# Patient Record
Sex: FEMALE | Race: WHITE | NOT HISPANIC OR LATINO | Employment: OTHER | URBAN - NONMETROPOLITAN AREA
[De-identification: names, ages, dates, MRNs, and addresses within clinical notes are randomized per-mention and may not be internally consistent; named-entity substitution may affect disease eponyms.]

---

## 2017-01-23 DIAGNOSIS — N18.9 CHRONIC KIDNEY DISEASE, UNSPECIFIED: Primary | ICD-10-CM

## 2017-01-24 ENCOUNTER — LAB (OUTPATIENT)
Dept: ONCOLOGY | Facility: CLINIC | Age: 77
End: 2017-01-24

## 2017-01-24 ENCOUNTER — OFFICE VISIT (OUTPATIENT)
Dept: ONCOLOGY | Facility: CLINIC | Age: 77
End: 2017-01-24

## 2017-01-24 ENCOUNTER — INFUSION (OUTPATIENT)
Dept: ONCOLOGY | Facility: HOSPITAL | Age: 77
End: 2017-01-24

## 2017-01-24 VITALS
SYSTOLIC BLOOD PRESSURE: 129 MMHG | HEART RATE: 78 BPM | RESPIRATION RATE: 20 BRPM | DIASTOLIC BLOOD PRESSURE: 70 MMHG | HEIGHT: 62 IN | TEMPERATURE: 97.4 F | OXYGEN SATURATION: 99 % | BODY MASS INDEX: 23.55 KG/M2 | WEIGHT: 128 LBS

## 2017-01-24 VITALS
TEMPERATURE: 98 F | BODY MASS INDEX: 23.55 KG/M2 | RESPIRATION RATE: 16 BRPM | WEIGHT: 128 LBS | HEIGHT: 62 IN | SYSTOLIC BLOOD PRESSURE: 136 MMHG | DIASTOLIC BLOOD PRESSURE: 82 MMHG | HEART RATE: 80 BPM | OXYGEN SATURATION: 98 %

## 2017-01-24 DIAGNOSIS — N18.30 ANEMIA OF CHRONIC RENAL FAILURE, STAGE 3 (MODERATE) (HCC): Primary | ICD-10-CM

## 2017-01-24 DIAGNOSIS — D63.1 ANEMIA OF CHRONIC RENAL FAILURE, STAGE 3 (MODERATE) (HCC): Primary | ICD-10-CM

## 2017-01-24 DIAGNOSIS — N18.30 ANEMIA OF CHRONIC KIDNEY FAILURE, STAGE 3 (MODERATE) (HCC): ICD-10-CM

## 2017-01-24 DIAGNOSIS — N18.30 ANEMIA OF CHRONIC KIDNEY FAILURE, STAGE 3 (MODERATE) (HCC): Primary | ICD-10-CM

## 2017-01-24 DIAGNOSIS — N18.9 CHRONIC KIDNEY DISEASE, UNSPECIFIED: ICD-10-CM

## 2017-01-24 DIAGNOSIS — D63.1 ANEMIA OF CHRONIC KIDNEY FAILURE, STAGE 3 (MODERATE) (HCC): ICD-10-CM

## 2017-01-24 DIAGNOSIS — D63.1 ANEMIA OF CHRONIC KIDNEY FAILURE, STAGE 3 (MODERATE) (HCC): Primary | ICD-10-CM

## 2017-01-24 PROBLEM — E53.8 B12 DEFICIENCY: Status: ACTIVE | Noted: 2017-01-24

## 2017-01-24 LAB
ALBUMIN SERPL-MCNC: 3.2 G/DL (ref 3.5–5)
ALBUMIN/GLOB SERPL: 1.1 G/DL
ALP SERPL-CCNC: 71 U/L (ref 38–126)
ALT SERPL W P-5'-P-CCNC: 21 U/L (ref 9–52)
ANION GAP SERPL CALCULATED.3IONS-SCNC: 11 MMOL/L
AST SERPL-CCNC: 30 U/L (ref 5–40)
AUTO MIXED CELLS #: 0.3 10*3/UL (ref 0.1–1.5)
AUTO MIXED CELLS %: 4.8 % (ref 0.2–15.1)
BILIRUB SERPL-MCNC: 0.3 MG/DL (ref 0.2–1.3)
BUN BLD-MCNC: 33 MG/DL (ref 7–26)
BUN/CREAT SERPL: 14.3 (ref 7–25)
CALCIUM SPEC-SCNC: 8.6 MG/DL (ref 8.4–10.2)
CHLORIDE SERPL-SCNC: 109 MMOL/L (ref 98–107)
CO2 SERPL-SCNC: 22 MMOL/L (ref 22–30)
CREAT BLD-MCNC: 2.3 MG/DL (ref 0.7–1.4)
ERYTHROCYTE [DISTWIDTH] IN BLOOD BY AUTOMATED COUNT: 15.2 % (ref 11.5–14.5)
GFR SERPL CREATININE-BSD FRML MDRD: 21 ML/MIN/1.73
GLOBULIN UR ELPH-MCNC: 3 GM/DL
GLUCOSE BLD-MCNC: 88 MG/DL (ref 75–110)
HCT VFR BLD AUTO: 31.9 % (ref 37–47)
HGB BLD-MCNC: 9.2 G/DL (ref 12–16)
LYMPHOCYTES # BLD AUTO: 2.5 10*3/MM3 (ref 0.8–7)
LYMPHOCYTES NFR BLD AUTO: 35.6 % (ref 10–58.5)
MCH RBC QN AUTO: 29.8 PG (ref 27–31)
MCHC RBC AUTO-ENTMCNC: 28.8 G/DL (ref 33–37)
MCV RBC AUTO: 103.2 FL (ref 81–99)
NEUTROPHILS # BLD AUTO: 4.2 10*3/MM3 (ref 2–7.8)
NEUTROPHILS NFR BLD AUTO: 59.6 % (ref 37–92)
PLATELET # BLD AUTO: 227 10*3/MM3 (ref 130–400)
PMV BLD AUTO: 8.9 FL (ref 6–12)
POTASSIUM BLD-SCNC: 3.8 MMOL/L (ref 3.6–5)
PROT SERPL-MCNC: 6.2 G/DL (ref 6.3–8.2)
RBC # BLD AUTO: 3.09 10*6/MM3 (ref 4.2–5.4)
SODIUM BLD-SCNC: 142 MMOL/L (ref 137–145)
WBC NRBC COR # BLD: 7 10*3/MM3 (ref 4.8–10.8)

## 2017-01-24 PROCEDURE — 25010000002 EPOETIN ALFA PER 1000 UNITS: Performed by: INTERNAL MEDICINE

## 2017-01-24 PROCEDURE — 85025 COMPLETE CBC W/AUTO DIFF WBC: CPT | Performed by: INTERNAL MEDICINE

## 2017-01-24 PROCEDURE — 80053 COMPREHEN METABOLIC PANEL: CPT | Performed by: INTERNAL MEDICINE

## 2017-01-24 PROCEDURE — 25010000002 CYANOCOBALAMIN PER 1000 MCG: Performed by: INTERNAL MEDICINE

## 2017-01-24 PROCEDURE — 99214 OFFICE O/P EST MOD 30 MIN: CPT | Performed by: INTERNAL MEDICINE

## 2017-01-24 PROCEDURE — 96372 THER/PROPH/DIAG INJ SC/IM: CPT

## 2017-01-24 PROCEDURE — 36415 COLL VENOUS BLD VENIPUNCTURE: CPT | Performed by: INTERNAL MEDICINE

## 2017-01-24 RX ORDER — CYANOCOBALAMIN 1000 UG/ML
1000 INJECTION, SOLUTION INTRAMUSCULAR; SUBCUTANEOUS ONCE
Status: CANCELLED | OUTPATIENT
Start: 2017-01-24

## 2017-01-24 RX ORDER — CYANOCOBALAMIN 1000 UG/ML
1000 INJECTION, SOLUTION INTRAMUSCULAR; SUBCUTANEOUS ONCE
Status: CANCELLED | OUTPATIENT
Start: 2017-02-21

## 2017-01-24 RX ORDER — CYANOCOBALAMIN 1000 UG/ML
1000 INJECTION, SOLUTION INTRAMUSCULAR; SUBCUTANEOUS ONCE
Status: COMPLETED | OUTPATIENT
Start: 2017-01-24 | End: 2017-01-24

## 2017-01-24 RX ADMIN — CYANOCOBALAMIN 1000 MCG: 1000 INJECTION, SOLUTION INTRAMUSCULAR at 14:55

## 2017-01-24 RX ADMIN — ERYTHROPOIETIN 40000 UNITS: 40000 INJECTION, SOLUTION INTRAVENOUS; SUBCUTANEOUS at 14:46

## 2017-01-24 NOTE — PROGRESS NOTES
Patient ID:   Susanne Valentine is a 76 y.o. female.  Referring Physician:   No referring provider defined for this encounter.  Primary Care Provider:  KOURTNEY Celeste    Assessment/Plan:  Assessment   Patient Active Problem List   Diagnosis   • Anemia of chronic kidney failure   • Anemia of chronic renal failure, stage 3 (moderate)     Cancer Staging Information:  No matching staging information was found for the patient.    There are no diagnoses linked to this encounter.  The patient has a stable anemia from her chronic kidney disease, with the use of Procrit once a month as necessary.  We will hold the treatment today, and have her come back in 1 month with a repeat CBC, we will proceed with Procrit at 40,000 units subcutaneously if her hemoglobin is less than 10 g/DL.  Patient ID:   Susanne Valentine is a 76 y.o. female.  Referring Physician:   No referring provider defined for this encounter.  Primary Care Provider:  KOURTNEY Celeste    Assessment/Plan:  Assessment   Patient Active Problem List   Diagnosis   • Anemia of chronic kidney failure   • Anemia of chronic renal failure, stage 3 (moderate)     Cancer Staging Information:  No matching staging information was found for the patient.    There are no diagnoses linked to this encounter.  The patient has a stable anemia from her chronic kidney disease, with the use of Procrit once a month as necessary.  We will hold the treatment today, and have her come back in 1 month with a repeat CBC, we will proceed with Procrit at 40,000 units subcutaneously if her hemoglobin is less than 10 g/DL.     Interval History:  The patient is here for follow-up and for possible consideration for Procrit.  She has no new complaints at this time and has been doing well.  Apparently her   5 years ago, and she reminded me that I am seeing her in the past when I was still here.  She did not get any Procrit on her last visit    Interval Notes:  The following  "portions of the patient's history were reviewed and updated as appropriate: allergies, current medications, past family history, past medical history, past social history, past surgical history and problem list.     History of Present Illness   76-year-old white lady with chronic kidney disease has an anemia and has been on ESAs since 2008. She has been tolerating it well and her anemia has been stable with a supplement.    Review of Systems   Constitutional: Negative.    HENT: Negative.    Eyes: Negative.    Respiratory: Negative.    Cardiovascular: Negative.    Gastrointestinal: Negative.    Endocrine: Negative.    Genitourinary: Negative.    Musculoskeletal: Negative.    Allergic/Immunologic: Negative.    Neurological: Negative.    Hematological: Negative.    Psychiatric/Behavioral: Negative.         Physical Exam:  Vital Signs for this encounter:  BSA: 1.58 meters squared  Visit Vitals   • /82   • Pulse 80   • Temp 98 °F (36.7 °C) (Tympanic)   • Resp 16   • Ht 62\" (157.5 cm)   • Wt 128 lb (58.1 kg)   • LMP  (LMP Unknown)   • SpO2 98%   • BMI 23.41 kg/m2       Physical Exam   Constitutional: She is oriented to person, place, and time. She appears well-developed and well-nourished. No distress.   HENT:   Head: Normocephalic and atraumatic.   Nose: Nose normal.   Mouth/Throat: Oropharynx is clear and moist. No oropharyngeal exudate.   Eyes: Conjunctivae and EOM are normal. Pupils are equal, round, and reactive to light. No scleral icterus.   Neck: Normal range of motion. Neck supple. No JVD present. No thyromegaly present.   Cardiovascular: Normal rate, regular rhythm and normal heart sounds.  Exam reveals no gallop and no friction rub.    Pulmonary/Chest: Effort normal and breath sounds normal. No respiratory distress. She has no wheezes. She has no rales. She exhibits no tenderness.   Abdominal: Soft. Bowel sounds are normal. She exhibits no distension. There is no tenderness. There is no guarding. "   Musculoskeletal: Normal range of motion. She exhibits no edema or tenderness.   Lymphadenopathy:     She has no cervical adenopathy.   Neurological: She is alert and oriented to person, place, and time. No cranial nerve deficit.   Skin: Skin is warm and dry.   Few purpuric spots, forearms   Psychiatric: She has a normal mood and affect. Her behavior is normal. Judgment and thought content normal.       Performance Status:  Asymptomatic    Results:  WBC   Date Value Ref Range Status   2017 7.00 4.80 - 10.80 10*3/mm3 Final   2014 6.73 4.80 - 10.80 K/mcL Final     HEMOGLOBIN   Date Value Ref Range Status   2017 9.2 (L) 12.0 - 16.0 g/dL Final   2014 10.5 (L) 12.0 - 16.0 g/dL Final     HEMATOCRIT   Date Value Ref Range Status   2017 31.9 (L) 37.0 - 47.0 % Final   2014 31.9 (L) 37.0 - 47.0 % Final     PLATELETS   Date Value Ref Range Status   2017 227 130 - 400 10*3/mm3 Final   2014 232 130 - 400 K/mcL Final     CREATININE   Date Value Ref Range Status   2016 2.10 (H) 0.70 - 1.40 mg/dL Final   2016 1.69 (H) 0.5 - 1.4 mg/dL Final     AST (SGOT)   Date Value Ref Range Status   2016 29 5 - 40 U/L Final   2016 56 (H) 7 - 45 Units/L Final        Interval History:  The patient is here for follow-up and for possible consideration for Procrit.  She has no new complaints at this time and has been doing well.  Apparently her   5 years ago, and she reminded me that I am seeing her in the past when I was still here.  She did not get any Procrit on her last visit    Interval Notes:  The following portions of the patient's history were reviewed and updated as appropriate: allergies, current medications, past family history, past medical history, past social history, past surgical history and problem list.     History of Present Illness   76-year-old white lady with chronic kidney disease has an anemia and has been on ESAs since . She has been  "tolerating it well and her anemia has been stable with a supplement.    Review of Systems   Constitutional: Negative.    HENT: Negative.    Eyes: Negative.    Respiratory: Negative.    Cardiovascular: Negative.    Gastrointestinal: Negative.    Endocrine: Negative.    Genitourinary: Negative.    Musculoskeletal: Negative.    Allergic/Immunologic: Negative.    Neurological: Negative.    Hematological: Negative.    Psychiatric/Behavioral: Negative.         Physical Exam:  Vital Signs for this encounter:  BSA: 1.58 meters squared  Visit Vitals   • /82   • Pulse 80   • Temp 98 °F (36.7 °C) (Tympanic)   • Resp 16   • Ht 62\" (157.5 cm)   • Wt 128 lb (58.1 kg)   • LMP  (LMP Unknown)   • SpO2 98%   • BMI 23.41 kg/m2       Physical Exam   Constitutional: She is oriented to person, place, and time. She appears well-developed and well-nourished. No distress.   HENT:   Head: Normocephalic and atraumatic.   Nose: Nose normal.   Mouth/Throat: Oropharynx is clear and moist. No oropharyngeal exudate.   Eyes: Conjunctivae and EOM are normal. Pupils are equal, round, and reactive to light. No scleral icterus.   Neck: Normal range of motion. Neck supple. No JVD present. No thyromegaly present.   Cardiovascular: Normal rate, regular rhythm and normal heart sounds.  Exam reveals no gallop and no friction rub.    Pulmonary/Chest: Effort normal and breath sounds normal. No respiratory distress. She has no wheezes. She has no rales. She exhibits no tenderness.   Abdominal: Soft. Bowel sounds are normal. She exhibits no distension. There is no tenderness. There is no guarding.   Musculoskeletal: Normal range of motion. She exhibits no edema or tenderness.   Lymphadenopathy:     She has no cervical adenopathy.   Neurological: She is alert and oriented to person, place, and time. No cranial nerve deficit.   Skin: Skin is warm and dry.   Few purpuric spots, forearms   Psychiatric: She has a normal mood and affect. Her behavior is normal. " Judgment and thought content normal.       Performance Status:  Asymptomatic    Results:  WBC   Date Value Ref Range Status   01/24/2017 7.00 4.80 - 10.80 10*3/mm3 Final   01/09/2014 6.73 4.80 - 10.80 K/mcL Final     HEMOGLOBIN   Date Value Ref Range Status   01/24/2017 9.2 (L) 12.0 - 16.0 g/dL Final   01/09/2014 10.5 (L) 12.0 - 16.0 g/dL Final     HEMATOCRIT   Date Value Ref Range Status   01/24/2017 31.9 (L) 37.0 - 47.0 % Final   01/09/2014 31.9 (L) 37.0 - 47.0 % Final     PLATELETS   Date Value Ref Range Status   01/24/2017 227 130 - 400 10*3/mm3 Final   01/09/2014 232 130 - 400 K/mcL Final     CREATININE   Date Value Ref Range Status   12/23/2016 2.10 (H) 0.70 - 1.40 mg/dL Final   03/22/2016 1.69 (H) 0.5 - 1.4 mg/dL Final     AST (SGOT)   Date Value Ref Range Status   12/23/2016 29 5 - 40 U/L Final   03/22/2016 56 (H) 7 - 45 Units/L Final     Anemia of chronic kidney disease.  Hemoglobin today is 9.2 continue Procrit.  Patient clinically asymptomatic.  Over the last 3 months for MCV of, therefore I am would check vitamin B12 and folic acid on today's draw and I have asked her to start taking oral folic acid 400 µg daily as well as start receiving vitamin B12 1000 µg subcutaneous every 4 weekly.  If the MCV remains high in another 3-4 months I may consider doing a bone marrow biopsy to diagnose myelodysplasia.  The last colonoscopy was done within the last 5 years and a verbal report given to me by the patient it was negative.

## 2017-01-24 NOTE — MR AVS SNAPSHOT
Susanne GRACE Serge   1/24/2017 1:00 PM   Office Visit    Dept Phone:  [phone]   Encounter #:  [id number]    Provider:  García Wood MD   Department:  [facility] HEMATOLOGY & ONCOLOGY                Your Full Care Plan              Your Updated Medication List          This list is accurate as of: 1/24/17  1:44 PM.  Always use your most recent med list.                amLODIPine 5 MG tablet   Commonly known as:  NORVASC       aspirin 81 MG tablet       AVAPRO 300 MG tablet   Generic drug:  irbesartan       calcitriol 0.25 MCG capsule   Commonly known as:  ROCALTROL       diltiaZEM 120 MG tablet   Commonly known as:  CARDIZEM       esomeprazole 40 MG capsule   Commonly known as:  nexIUM       ferrous sulfate 325 (65 FE) MG tablet       fish oil 1000 MG capsule capsule       furosemide 40 MG tablet   Commonly known as:  LASIX       * lisinopril 5 MG tablet   Commonly known as:  PRINIVIL,ZESTRIL       * lisinopril 40 MG tablet   Commonly known as:  PRINIVIL,ZESTRIL       omeprazole 20 MG capsule   Commonly known as:  priLOSEC       PRESERVISION AREDS PO       spironolactone 25 MG tablet   Commonly known as:  ALDACTONE       * SYNTHROID 25 MCG tablet   Generic drug:  levothyroxine       * levothyroxine 50 MCG tablet   Commonly known as:  SYNTHROID, LEVOTHROID       Vitamin D3 1000 UNITS capsule       ZETIA 10 MG tablet   Generic drug:  ezetimibe       * Notice:  This list has 4 medication(s) that are the same as other medications prescribed for you. Read the directions carefully, and ask your doctor or other care provider to review them with you.            Instructions     None    Patient Instructions History      Upcoming Appointments     Visit Type Date Time Department    FOLLOW UP 1 UNIT 1/24/2017  1:00 PM [facility]    LAB 1/24/2017 12:45 PM [facility]    INJECTION - NON CHEMO 1/24/2017  2:30 PM  [facility] OP INFU ONC    FOLLOW UP 1 UNIT 2/27/2017 11:30 AM [facility]  ONC [facility]    LAB 2/27/2017 11:15 AM [facility] [place]    MAMMO PAD DIAG SG BILATERAL 6/30/2017 10:20 AM [facility] MAMMO BIC    US PAD BREAST RIGHT COMPLETE 6/30/2017 11:00 AM [facility] US BIC      MyChart Signup     Our records indicate that your [facility] account has been deactivated. If you would like to reactivate your account, please email [email or web address] or call [phone] to talk to our [employer] staff.             Other Info from Your Visit           Your Appointments     Jan 24, 2017  2:30 PM CST   INJECTION with -3  PAD OP INFUS   [facility] OP INFU ONC– ONCOLOGY ([place])    [address] [place] [id number] [phone]            Feb 27, 2017 11:15 AM CST   LAB with [facility] ONC PAD LAB   [facility] HEMATOLOGY & ONCOLOGY ([place])    [address] [address]   [phone]            Feb 27, 2017 11:30 AM CST   FOLLOW UP with García Wood MD   [facility] HEMATOLOGY & ONCOLOGY ([place])    [address] [address]   [phone]            Jun 30, 2017 10:20 AM CDT   (Arrive by 9:55 AM CST)   Mammo pad  diag bilateral with PAD BIC MAMM 1   [facility] MAMMO BIC ([place]thel)    [address] [place] [id number] [phone]           Arrive 30 minutes prior to exam time. Do not apply deodorant or powders on the day of the exam. Must bring previous Mammography images/reports if not taken at [facility]. Can take all medications as directed.            Jun 30, 2017 11:00 AM CDT   US pad breast right complete with PAD BIC US 1   [facility] US BIC (Ethel)    [address] [place] [id number] [phone]           Please arrive 30 min early to register Bring a list of current medications. Medications are okay to take.              Allergies     Statins  Other (See Comments)    Muscle aches       Vital Signs     Blood Pressure Pulse Temperature Respirations Height  "Weight    136/82 80 98 °F (36.7 °C) (Tympanic) 16 62\" (157.5 cm) 128 lb (58.1 kg)    Last Menstrual Period Oxygen Saturation Body Mass Index Smoking Status          (LMP Unknown) 98% 23.41 kg/m2 Never Smoker          "

## 2017-02-23 DIAGNOSIS — N18.30 ANEMIA OF CHRONIC KIDNEY FAILURE, STAGE 3 (MODERATE) (HCC): ICD-10-CM

## 2017-02-23 DIAGNOSIS — D63.1 ANEMIA OF CHRONIC RENAL FAILURE, STAGE 3 (MODERATE) (HCC): ICD-10-CM

## 2017-02-23 DIAGNOSIS — D63.1 ANEMIA OF CHRONIC KIDNEY FAILURE, STAGE 3 (MODERATE) (HCC): ICD-10-CM

## 2017-02-23 DIAGNOSIS — N18.30 ANEMIA OF CHRONIC RENAL FAILURE, STAGE 3 (MODERATE) (HCC): ICD-10-CM

## 2017-02-24 ENCOUNTER — INFUSION (OUTPATIENT)
Dept: ONCOLOGY | Facility: HOSPITAL | Age: 77
End: 2017-02-24
Attending: INTERNAL MEDICINE

## 2017-02-24 ENCOUNTER — LAB (OUTPATIENT)
Dept: ONCOLOGY | Facility: CLINIC | Age: 77
End: 2017-02-24

## 2017-02-24 ENCOUNTER — OFFICE VISIT (OUTPATIENT)
Dept: ONCOLOGY | Facility: CLINIC | Age: 77
End: 2017-02-24

## 2017-02-24 VITALS
OXYGEN SATURATION: 97 % | BODY MASS INDEX: 24.79 KG/M2 | TEMPERATURE: 98.7 F | HEART RATE: 82 BPM | SYSTOLIC BLOOD PRESSURE: 122 MMHG | DIASTOLIC BLOOD PRESSURE: 70 MMHG | WEIGHT: 134.7 LBS | RESPIRATION RATE: 16 BRPM | HEIGHT: 62 IN

## 2017-02-24 VITALS
TEMPERATURE: 97.3 F | WEIGHT: 135 LBS | DIASTOLIC BLOOD PRESSURE: 69 MMHG | HEART RATE: 72 BPM | BODY MASS INDEX: 25.49 KG/M2 | SYSTOLIC BLOOD PRESSURE: 150 MMHG | HEIGHT: 61 IN | OXYGEN SATURATION: 100 % | RESPIRATION RATE: 20 BRPM

## 2017-02-24 DIAGNOSIS — D63.1 ANEMIA OF CHRONIC RENAL FAILURE, STAGE 3 (MODERATE) (HCC): ICD-10-CM

## 2017-02-24 DIAGNOSIS — D63.1 ANEMIA OF CHRONIC KIDNEY FAILURE, STAGE 3 (MODERATE) (HCC): ICD-10-CM

## 2017-02-24 DIAGNOSIS — D63.1 ANEMIA OF CHRONIC RENAL FAILURE, STAGE 3 (MODERATE) (HCC): Primary | ICD-10-CM

## 2017-02-24 DIAGNOSIS — N18.30 ANEMIA OF CHRONIC RENAL FAILURE, STAGE 3 (MODERATE) (HCC): Primary | ICD-10-CM

## 2017-02-24 DIAGNOSIS — N18.30 ANEMIA OF CHRONIC KIDNEY FAILURE, STAGE 3 (MODERATE) (HCC): ICD-10-CM

## 2017-02-24 DIAGNOSIS — E53.8 B12 DEFICIENCY: ICD-10-CM

## 2017-02-24 DIAGNOSIS — N18.30 ANEMIA OF CHRONIC RENAL FAILURE, STAGE 3 (MODERATE) (HCC): ICD-10-CM

## 2017-02-24 LAB
ALBUMIN SERPL-MCNC: 3.3 G/DL (ref 3.5–5)
ALBUMIN/GLOB SERPL: 1.1 G/DL
ALP SERPL-CCNC: 67 U/L (ref 38–126)
ALT SERPL W P-5'-P-CCNC: 16 U/L (ref 9–52)
ANION GAP SERPL CALCULATED.3IONS-SCNC: 12 MMOL/L
AST SERPL-CCNC: 24 U/L (ref 5–40)
AUTO MIXED CELLS #: 0.4 10*3/UL (ref 0.1–1.5)
AUTO MIXED CELLS %: 6.5 % (ref 0.2–15.1)
BILIRUB SERPL-MCNC: 0.3 MG/DL (ref 0.2–1.3)
BUN BLD-MCNC: 37 MG/DL (ref 7–26)
BUN/CREAT SERPL: 17.6 (ref 7–25)
CALCIUM SPEC-SCNC: 8.6 MG/DL (ref 8.4–10.2)
CHLORIDE SERPL-SCNC: 106 MMOL/L (ref 98–107)
CO2 SERPL-SCNC: 20 MMOL/L (ref 22–30)
CREAT BLD-MCNC: 2.1 MG/DL (ref 0.7–1.4)
ERYTHROCYTE [DISTWIDTH] IN BLOOD BY AUTOMATED COUNT: 14.2 % (ref 11.5–14.5)
GFR SERPL CREATININE-BSD FRML MDRD: 23 ML/MIN/1.73
GLOBULIN UR ELPH-MCNC: 3 GM/DL
GLUCOSE BLD-MCNC: 87 MG/DL (ref 75–110)
HCT VFR BLD AUTO: 34.2 % (ref 37–47)
HGB BLD-MCNC: 10.3 G/DL (ref 12–16)
LYMPHOCYTES # BLD AUTO: 2.5 10*3/MM3 (ref 0.8–7)
LYMPHOCYTES NFR BLD AUTO: 39.4 % (ref 10–58.5)
MCH RBC QN AUTO: 31 PG (ref 27–31)
MCHC RBC AUTO-ENTMCNC: 30.1 G/DL (ref 33–37)
MCV RBC AUTO: 103.1 FL (ref 81–99)
NEUTROPHILS # BLD AUTO: 3.4 10*3/MM3 (ref 2–7.8)
NEUTROPHILS NFR BLD AUTO: 54.1 % (ref 37–92)
PLATELET # BLD AUTO: 241 10*3/MM3 (ref 130–400)
PMV BLD AUTO: 9 FL (ref 6–12)
POTASSIUM BLD-SCNC: 3.9 MMOL/L (ref 3.6–5)
PROT SERPL-MCNC: 6.3 G/DL (ref 6.3–8.2)
RBC # BLD AUTO: 3.32 10*6/MM3 (ref 4.2–5.4)
SODIUM BLD-SCNC: 138 MMOL/L (ref 137–145)
WBC NRBC COR # BLD: 6.3 10*3/MM3 (ref 4.8–10.8)

## 2017-02-24 PROCEDURE — 99214 OFFICE O/P EST MOD 30 MIN: CPT | Performed by: INTERNAL MEDICINE

## 2017-02-24 PROCEDURE — 80053 COMPREHEN METABOLIC PANEL: CPT | Performed by: INTERNAL MEDICINE

## 2017-02-24 PROCEDURE — 85025 COMPLETE CBC W/AUTO DIFF WBC: CPT | Performed by: INTERNAL MEDICINE

## 2017-02-24 PROCEDURE — 25010000002 CYANOCOBALAMIN PER 1000 MCG: Performed by: INTERNAL MEDICINE

## 2017-02-24 PROCEDURE — 36415 COLL VENOUS BLD VENIPUNCTURE: CPT | Performed by: INTERNAL MEDICINE

## 2017-02-24 PROCEDURE — 96372 THER/PROPH/DIAG INJ SC/IM: CPT

## 2017-02-24 PROCEDURE — 63510000001 EPOETIN ALFA PER 1000 UNITS: Performed by: INTERNAL MEDICINE

## 2017-02-24 RX ORDER — CYANOCOBALAMIN 1000 UG/ML
1000 INJECTION, SOLUTION INTRAMUSCULAR; SUBCUTANEOUS ONCE
Status: COMPLETED | OUTPATIENT
Start: 2017-02-24 | End: 2017-02-24

## 2017-02-24 RX ORDER — CYANOCOBALAMIN 1000 UG/ML
1000 INJECTION, SOLUTION INTRAMUSCULAR; SUBCUTANEOUS ONCE
Status: CANCELLED | OUTPATIENT
Start: 2017-03-21

## 2017-02-24 RX ADMIN — ERYTHROPOIETIN 40000 UNITS: 40000 INJECTION, SOLUTION INTRAVENOUS; SUBCUTANEOUS at 12:07

## 2017-02-24 RX ADMIN — CYANOCOBALAMIN 1000 MCG: 1000 INJECTION, SOLUTION INTRAMUSCULAR at 12:07

## 2017-03-24 ENCOUNTER — APPOINTMENT (OUTPATIENT)
Dept: ONCOLOGY | Facility: HOSPITAL | Age: 77
End: 2017-03-24
Attending: INTERNAL MEDICINE

## 2017-03-30 ENCOUNTER — APPOINTMENT (OUTPATIENT)
Dept: ONCOLOGY | Facility: HOSPITAL | Age: 77
End: 2017-03-30
Attending: INTERNAL MEDICINE

## 2017-03-30 DIAGNOSIS — N18.30 CHRONIC KIDNEY DISEASE, STAGE III (MODERATE) (HCC): Primary | ICD-10-CM

## 2017-03-31 ENCOUNTER — LAB (OUTPATIENT)
Dept: ONCOLOGY | Facility: CLINIC | Age: 77
End: 2017-03-31

## 2017-03-31 ENCOUNTER — OFFICE VISIT (OUTPATIENT)
Dept: ONCOLOGY | Facility: CLINIC | Age: 77
End: 2017-03-31

## 2017-03-31 ENCOUNTER — INFUSION (OUTPATIENT)
Dept: ONCOLOGY | Facility: HOSPITAL | Age: 77
End: 2017-03-31

## 2017-03-31 VITALS
OXYGEN SATURATION: 97 % | HEART RATE: 68 BPM | BODY MASS INDEX: 23.48 KG/M2 | HEIGHT: 62 IN | RESPIRATION RATE: 20 BRPM | SYSTOLIC BLOOD PRESSURE: 151 MMHG | TEMPERATURE: 97.5 F | DIASTOLIC BLOOD PRESSURE: 66 MMHG | WEIGHT: 127.6 LBS

## 2017-03-31 VITALS
HEIGHT: 61 IN | SYSTOLIC BLOOD PRESSURE: 132 MMHG | BODY MASS INDEX: 24.03 KG/M2 | HEART RATE: 69 BPM | RESPIRATION RATE: 18 BRPM | OXYGEN SATURATION: 96 % | TEMPERATURE: 97.4 F | DIASTOLIC BLOOD PRESSURE: 74 MMHG | WEIGHT: 127.3 LBS

## 2017-03-31 DIAGNOSIS — N18.30 ANEMIA OF CHRONIC KIDNEY FAILURE, STAGE 3 (MODERATE) (HCC): Primary | ICD-10-CM

## 2017-03-31 DIAGNOSIS — N18.30 ANEMIA OF CHRONIC RENAL FAILURE, STAGE 3 (MODERATE) (HCC): ICD-10-CM

## 2017-03-31 DIAGNOSIS — D63.1 ANEMIA OF CHRONIC KIDNEY FAILURE, STAGE 3 (MODERATE) (HCC): ICD-10-CM

## 2017-03-31 DIAGNOSIS — D63.1 ANEMIA OF CHRONIC RENAL FAILURE, STAGE 3 (MODERATE) (HCC): Primary | ICD-10-CM

## 2017-03-31 DIAGNOSIS — N18.30 CHRONIC KIDNEY DISEASE, STAGE III (MODERATE) (HCC): ICD-10-CM

## 2017-03-31 DIAGNOSIS — N18.30 ANEMIA OF CHRONIC KIDNEY FAILURE, STAGE 3 (MODERATE) (HCC): ICD-10-CM

## 2017-03-31 DIAGNOSIS — D63.1 ANEMIA OF CHRONIC RENAL FAILURE, STAGE 3 (MODERATE) (HCC): ICD-10-CM

## 2017-03-31 DIAGNOSIS — N18.30 ANEMIA OF CHRONIC RENAL FAILURE, STAGE 3 (MODERATE) (HCC): Primary | ICD-10-CM

## 2017-03-31 DIAGNOSIS — E53.8 B12 DEFICIENCY: Primary | ICD-10-CM

## 2017-03-31 DIAGNOSIS — D63.1 ANEMIA OF CHRONIC KIDNEY FAILURE, STAGE 3 (MODERATE) (HCC): Primary | ICD-10-CM

## 2017-03-31 LAB
ALBUMIN SERPL-MCNC: 3.4 G/DL (ref 3.5–5)
ALBUMIN/GLOB SERPL: 1.3 G/DL
ALP SERPL-CCNC: 57 U/L (ref 38–126)
ALT SERPL W P-5'-P-CCNC: 20 U/L (ref 9–52)
ANION GAP SERPL CALCULATED.3IONS-SCNC: 9 MMOL/L
AST SERPL-CCNC: 23 U/L (ref 5–40)
AUTO MIXED CELLS #: 0.5 10*3/UL (ref 0.1–1.5)
AUTO MIXED CELLS %: 9.4 % (ref 0.2–15.1)
BILIRUB SERPL-MCNC: 0.3 MG/DL (ref 0.2–1.3)
BUN BLD-MCNC: 40 MG/DL (ref 7–26)
BUN/CREAT SERPL: 17.4 (ref 7–25)
CALCIUM SPEC-SCNC: 8.7 MG/DL (ref 8.4–10.2)
CHLORIDE SERPL-SCNC: 108 MMOL/L (ref 98–107)
CO2 SERPL-SCNC: 21 MMOL/L (ref 22–30)
CREAT BLD-MCNC: 2.3 MG/DL (ref 0.7–1.4)
ERYTHROCYTE [DISTWIDTH] IN BLOOD BY AUTOMATED COUNT: 13.4 % (ref 11.5–14.5)
GFR SERPL CREATININE-BSD FRML MDRD: 21 ML/MIN/1.73
GLOBULIN UR ELPH-MCNC: 2.7 GM/DL
GLUCOSE BLD-MCNC: 96 MG/DL (ref 75–110)
HCT VFR BLD AUTO: 33.9 % (ref 37–47)
HGB BLD-MCNC: 10.8 G/DL (ref 12–16)
LYMPHOCYTES # BLD AUTO: 2.5 10*3/MM3 (ref 0.8–7)
LYMPHOCYTES NFR BLD AUTO: 45 % (ref 10–58.5)
MCH RBC QN AUTO: 31.2 PG (ref 27–31)
MCHC RBC AUTO-ENTMCNC: 31.9 G/DL (ref 33–37)
MCV RBC AUTO: 97.9 FL (ref 81–99)
NEUTROPHILS # BLD AUTO: 2.5 10*3/MM3 (ref 2–7.8)
NEUTROPHILS NFR BLD AUTO: 45.6 % (ref 37–92)
PLATELET # BLD AUTO: 218 10*3/MM3 (ref 130–400)
PMV BLD AUTO: 8.8 FL (ref 6–12)
POTASSIUM BLD-SCNC: 3.7 MMOL/L (ref 3.6–5)
PROT SERPL-MCNC: 6.1 G/DL (ref 6.3–8.2)
RBC # BLD AUTO: 3.46 10*6/MM3 (ref 4.2–5.4)
SODIUM BLD-SCNC: 138 MMOL/L (ref 137–145)
WBC NRBC COR # BLD: 5.5 10*3/MM3 (ref 4.8–10.8)

## 2017-03-31 PROCEDURE — 80053 COMPREHEN METABOLIC PANEL: CPT | Performed by: INTERNAL MEDICINE

## 2017-03-31 PROCEDURE — 63510000001 EPOETIN ALFA PER 1000 UNITS: Performed by: INTERNAL MEDICINE

## 2017-03-31 PROCEDURE — 96372 THER/PROPH/DIAG INJ SC/IM: CPT

## 2017-03-31 PROCEDURE — 85025 COMPLETE CBC W/AUTO DIFF WBC: CPT | Performed by: INTERNAL MEDICINE

## 2017-03-31 PROCEDURE — 99212 OFFICE O/P EST SF 10 MIN: CPT | Performed by: INTERNAL MEDICINE

## 2017-03-31 PROCEDURE — 36415 COLL VENOUS BLD VENIPUNCTURE: CPT | Performed by: INTERNAL MEDICINE

## 2017-03-31 PROCEDURE — 25010000002 CYANOCOBALAMIN PER 1000 MCG: Performed by: INTERNAL MEDICINE

## 2017-03-31 RX ORDER — CYANOCOBALAMIN 1000 UG/ML
1000 INJECTION, SOLUTION INTRAMUSCULAR; SUBCUTANEOUS ONCE
Status: CANCELLED
Start: 2017-03-31 | End: 2017-03-31

## 2017-03-31 RX ORDER — CYANOCOBALAMIN 1000 UG/ML
1000 INJECTION, SOLUTION INTRAMUSCULAR; SUBCUTANEOUS ONCE
Status: COMPLETED | OUTPATIENT
Start: 2017-03-31 | End: 2017-03-31

## 2017-03-31 RX ADMIN — ERYTHROPOIETIN 40000 UNITS: 40000 INJECTION, SOLUTION INTRAVENOUS; SUBCUTANEOUS at 09:48

## 2017-03-31 RX ADMIN — CYANOCOBALAMIN 1000 MCG: 1000 INJECTION, SOLUTION INTRAMUSCULAR at 09:45

## 2017-03-31 NOTE — PROGRESS NOTES
[facility]  HEMATOLOGY & ONCOLOGY        Subjective Ms Valentine is in doing well-so well that she mowed the grass at their abode!  Hb 10.8 today and we discussed B12 administration.  She will continue same plan and return in 1 month.    VISIT DIAGNOSIS:   Encounter Diagnoses   Name Primary?   • B12 deficiency Yes   • Anemia of chronic renal failure, stage 3 (moderate)        REASON FOR VISIT:     Chief Complaint   Patient presents with   • Follow-up     She is here for f/u visit today and to review her lab work. No new c/o.        HEMATOLOGY / ONCOLOGY HISTORY:    No history exists.       Cancer Staging Information:  No matching staging information was found for the patient.      INTERVAL HISTORY  Patient ID: Susanne Valentine is a 76 y.o. year old female is here today for follow-up.      Past Medical History:   Past Medical History:   Diagnosis Date   • Anemia     2007   • B12 deficiency    • CKD (chronic kidney disease), stage II     2006   • Colon polyps    • GERD (gastroesophageal reflux disease)    • Hiatal hernia     1993   • History of blood transfusion    • Hyperlipidemia     2006   • Hypertension    • Nephrotic syndrome     membranous glomerulonephritis: 2006    • Osteoporosis      Past Surgical History:   Past Surgical History:   Procedure Laterality Date   • ADENOIDECTOMY      1946   • BREAST LUMPECTOMY      1990   • CHOLECYSTECTOMY      1993   • HERNIA REPAIR      2006   • RENAL BIOPSY  07/17/2007   • TONSILLECTOMY      1946   • TUBAL ABDOMINAL LIGATION      1960     Social History:   Social History     Social History   • Marital status:      Spouse name: N/A   • Number of children: N/A   • Years of education: N/A     Occupational History   • Not on file.     Social History Main Topics   • Smoking status: Never Smoker   • Smokeless tobacco: Not on file   • Alcohol use No   • Drug use: Not on file   • Sexual activity: Not on file     Other Topics Concern   • Not on file     Social  History Narrative     Family History:   Family History   Problem Relation Age of Onset   • Heart disease Mother    • Hypertension Mother    • Cancer Mother    • Liver disease Father    • Heart disease Sister    • Anemia Sister    • Hyperlipidemia Son        Review of Systems   All other systems reviewed and are negative.       Performance Status:  Asymptomatic    Medications:    Current Outpatient Prescriptions   Medication Sig Dispense Refill   • amLODIPine (NORVASC) 5 MG tablet Take 5 mg by mouth Daily.     • aspirin 81 MG tablet Take 81 mg by mouth Daily.     • calcitriol (ROCALTROL) 0.25 MCG capsule Take 0.25 mcg by mouth Daily.     • Cholecalciferol (VITAMIN D3) 1000 UNITS capsule Take 5,000 Units by mouth.     • diltiazem (CARDIZEM) 120 MG tablet Take 120 mg by mouth.     • esomeprazole (NexIUM) 40 MG capsule Take 40 mg by mouth.     • ezetimibe (ZETIA) 10 MG tablet Take 10 mg by mouth Daily.     • ferrous sulfate 325 (65 FE) MG tablet Take 325 mg by mouth.     • furosemide (LASIX) 40 MG tablet Take 40 mg by mouth.     • irbesartan (AVAPRO) 300 MG tablet Take 300 mg by mouth.     • levothyroxine (SYNTHROID) 25 MCG tablet Take 25 mcg by mouth Daily.     • levothyroxine (SYNTHROID, LEVOTHROID) 50 MCG tablet      • lisinopril (PRINIVIL,ZESTRIL) 40 MG tablet      • lisinopril (PRINIVIL,ZESTRIL) 5 MG tablet Take 5 mg by mouth Daily.     • Multiple Vitamins-Minerals (PRESERVISION AREDS PO) Take  by mouth.     • Omega-3 Fatty Acids (FISH OIL) 1000 MG capsule capsule Take 1,000 mg by mouth.     • omeprazole (PriLOSEC) 20 MG capsule Take 20 mg by mouth Daily.     • spironolactone (ALDACTONE) 25 MG tablet Take 25 mg by mouth Daily.       No current facility-administered medications for this visit.        ALLERGIES:    Allergies   Allergen Reactions   • Statins Other (See Comments)     Muscle aches        Objective      Vitals:    03/31/17 0847   BP: 132/74   Pulse: 69   Resp: 18   Temp: 97.4 °F (36.3 °C)   TempSrc:  "Tympanic   SpO2: 96%   Weight: 127 lb 4.8 oz (57.7 kg)   Height: 61\" (154.9 cm)     /74  Pulse 69  Temp 97.4 °F (36.3 °C) (Tympanic)   Resp 18  Ht 61\" (154.9 cm)  Wt 127 lb 4.8 oz (57.7 kg)  SpO2 96%  BMI 24.05 kg/m2    Current Status 3/31/2017   ECOG score 0         General Appearance:    Alert, cooperative, no distress, appears stated age   Head:    Normocephalic, without obvious abnormality, atraumatic   Eyes:    PERRL, conjunctiva/corneas clear, EOM's intact, fundi     benign, both eyes   Ears:    Normal TM's and external ear canals, both ears   Nose:   Nares normal, septum midline, mucosa normal, no drainage     or sinus tenderness   Throat:   Lips, mucosa, and tongue normal; teeth and gums normal   Neck:   Supple, symmetrical, trachea midline, no adenopathy;     thyroid:  no enlargement/tenderness/nodules; no carotid    bruit or JVD   Back:     Symmetric, no curvature, ROM normal, no CVA tenderness   Lungs:     Clear to auscultation bilaterally, respirations unlabored   Chest Wall:    No tenderness or deformity    Heart:    Regular rate and rhythm, S1 and S2 normal, no murmur, rub    or gallop   Abdomen:     Soft, non-tender, bowel sounds active all four quadrants,     no masses, no organomegaly   Extremities:   Extremities normal, atraumatic, no cyanosis or edema   Pulses:   2+ and symmetric all extremities   Skin:   Skin color, texture, turgor normal, no rashes or lesions   Lymph nodes:   Cervical, supraclavicular, and axillary nodes normal   Neurologic:   CNII-XII intact, normal strength, sensation and reflexes     throughout       RECENT LABS:    No results found for: AMPHETSCREEN, BARBITSCNUR, LABBENZSCN, COCAINEUR, OSMOLALITY, PCPUR, THCSCNINP, LABMETHSCN     Results for orders placed or performed in visit on 03/31/17   CBC Auto Differential   Result Value Ref Range    WBC 5.50 4.80 - 10.80 10*3/mm3    RBC 3.46 (L) 4.20 - 5.40 10*6/mm3    Hemoglobin 10.8 (L) 12.0 - 16.0 g/dL    Hematocrit " 33.9 (L) 37.0 - 47.0 %    MCV 97.9 81.0 - 99.0 fL    MCH 31.2 (H) 27.0 - 31.0 pg    MCHC 31.9 (L) 33.0 - 37.0 g/dL    RDW 13.4 11.5 - 14.5 %    MPV 8.8 6.0 - 12.0 fL    Platelets 218 130 - 400 10*3/mm3    Neutrophil % 45.6 37.0 - 92.0 %    Lymphocyte % 45.0 10.0 - 58.5 %    Auto Mixed Cells % 9.4 0.2 - 15.1 %    Neutrophils, Absolute 2.50 2.00 - 7.80 10*3/mm3    Lymphocytes, Absolute 2.50 0.80 - 7.00 10*3/mm3    Auto Mixed Cells # 0.50 0.10 - 1.50 10*3/uL           RADIOLOGY:  No results found.      Assessment/Plan     Patient Active Problem List   Diagnosis   • Anemia of chronic kidney failure   • Anemia of chronic renal failure, stage 3 (moderate)   • B12 deficiency        Susanne was seen today for follow-up.    Diagnoses and all orders for this visit:    B12 deficiency    Anemia of chronic renal failure, stage 3 (moderate)     Give Procrit today along with B12 parenteral as before.  No real changes.          Chivo Zelaya MD    3/31/2017    9:03 AM

## 2017-04-10 ENCOUNTER — OFFICE VISIT (OUTPATIENT)
Dept: PRIMARY CARE CLINIC | Age: 77
End: 2017-04-10
Payer: MEDICARE

## 2017-04-10 VITALS
SYSTOLIC BLOOD PRESSURE: 116 MMHG | WEIGHT: 126 LBS | OXYGEN SATURATION: 95 % | HEART RATE: 54 BPM | BODY MASS INDEX: 23.19 KG/M2 | DIASTOLIC BLOOD PRESSURE: 62 MMHG | HEIGHT: 62 IN | RESPIRATION RATE: 18 BRPM

## 2017-04-10 DIAGNOSIS — N18.30 CKD (CHRONIC KIDNEY DISEASE) STAGE 3, GFR 30-59 ML/MIN (HCC): Primary | ICD-10-CM

## 2017-04-10 DIAGNOSIS — E03.9 UNSPECIFIED HYPOTHYROIDISM: ICD-10-CM

## 2017-04-10 DIAGNOSIS — I10 ESSENTIAL HYPERTENSION: ICD-10-CM

## 2017-04-10 PROCEDURE — 99214 OFFICE O/P EST MOD 30 MIN: CPT | Performed by: INTERNAL MEDICINE

## 2017-04-10 PROCEDURE — 1123F ACP DISCUSS/DSCN MKR DOCD: CPT | Performed by: INTERNAL MEDICINE

## 2017-04-10 PROCEDURE — 1036F TOBACCO NON-USER: CPT | Performed by: INTERNAL MEDICINE

## 2017-04-10 PROCEDURE — 4040F PNEUMOC VAC/ADMIN/RCVD: CPT | Performed by: INTERNAL MEDICINE

## 2017-04-10 PROCEDURE — 1090F PRES/ABSN URINE INCON ASSESS: CPT | Performed by: INTERNAL MEDICINE

## 2017-04-10 PROCEDURE — G8399 PT W/DXA RESULTS DOCUMENT: HCPCS | Performed by: INTERNAL MEDICINE

## 2017-04-10 PROCEDURE — G8420 CALC BMI NORM PARAMETERS: HCPCS | Performed by: INTERNAL MEDICINE

## 2017-04-10 PROCEDURE — G8427 DOCREV CUR MEDS BY ELIG CLIN: HCPCS | Performed by: INTERNAL MEDICINE

## 2017-04-10 RX ORDER — LEVOTHYROXINE SODIUM 0.05 MG/1
TABLET ORAL
Qty: 30 TABLET | Refills: 4 | Status: SHIPPED | OUTPATIENT
Start: 2017-04-10 | End: 2017-10-05 | Stop reason: SDUPTHER

## 2017-04-10 ASSESSMENT — ENCOUNTER SYMPTOMS
DIARRHEA: 0
NAUSEA: 0
CONSTIPATION: 0
COUGH: 0
SHORTNESS OF BREATH: 0
VOMITING: 0

## 2017-04-28 ENCOUNTER — APPOINTMENT (OUTPATIENT)
Dept: ONCOLOGY | Facility: HOSPITAL | Age: 77
End: 2017-04-28
Attending: INTERNAL MEDICINE

## 2017-05-09 DIAGNOSIS — N18.9 CHRONIC KIDNEY DISEASE, UNSPECIFIED: Primary | ICD-10-CM

## 2017-05-10 ENCOUNTER — OFFICE VISIT (OUTPATIENT)
Dept: ONCOLOGY | Facility: CLINIC | Age: 77
End: 2017-05-10

## 2017-05-10 ENCOUNTER — LAB (OUTPATIENT)
Dept: ONCOLOGY | Facility: CLINIC | Age: 77
End: 2017-05-10

## 2017-05-10 ENCOUNTER — INFUSION (OUTPATIENT)
Dept: ONCOLOGY | Facility: HOSPITAL | Age: 77
End: 2017-05-10
Attending: INTERNAL MEDICINE

## 2017-05-10 VITALS
DIASTOLIC BLOOD PRESSURE: 84 MMHG | HEART RATE: 78 BPM | TEMPERATURE: 97.9 F | SYSTOLIC BLOOD PRESSURE: 145 MMHG | RESPIRATION RATE: 20 BRPM | OXYGEN SATURATION: 100 %

## 2017-05-10 VITALS
OXYGEN SATURATION: 92 % | SYSTOLIC BLOOD PRESSURE: 148 MMHG | RESPIRATION RATE: 16 BRPM | HEART RATE: 88 BPM | DIASTOLIC BLOOD PRESSURE: 86 MMHG | BODY MASS INDEX: 23.24 KG/M2 | HEIGHT: 62 IN | TEMPERATURE: 97.6 F | WEIGHT: 126.3 LBS

## 2017-05-10 DIAGNOSIS — N18.30 ANEMIA OF CHRONIC RENAL FAILURE, STAGE 3 (MODERATE) (HCC): Primary | ICD-10-CM

## 2017-05-10 DIAGNOSIS — N18.30 ANEMIA OF CHRONIC KIDNEY FAILURE, STAGE 3 (MODERATE) (HCC): Primary | ICD-10-CM

## 2017-05-10 DIAGNOSIS — N18.9 CHRONIC KIDNEY DISEASE, UNSPECIFIED: ICD-10-CM

## 2017-05-10 DIAGNOSIS — D63.1 ANEMIA OF CHRONIC RENAL FAILURE, STAGE 3 (MODERATE) (HCC): Primary | ICD-10-CM

## 2017-05-10 DIAGNOSIS — D63.1 ANEMIA OF CHRONIC RENAL FAILURE, STAGE 3 (MODERATE) (HCC): ICD-10-CM

## 2017-05-10 DIAGNOSIS — N18.30 ANEMIA OF CHRONIC KIDNEY FAILURE, STAGE 3 (MODERATE) (HCC): ICD-10-CM

## 2017-05-10 DIAGNOSIS — D63.1 ANEMIA OF CHRONIC KIDNEY FAILURE, STAGE 3 (MODERATE) (HCC): ICD-10-CM

## 2017-05-10 DIAGNOSIS — N18.30 ANEMIA OF CHRONIC RENAL FAILURE, STAGE 3 (MODERATE) (HCC): ICD-10-CM

## 2017-05-10 DIAGNOSIS — D63.1 ANEMIA OF CHRONIC KIDNEY FAILURE, STAGE 3 (MODERATE) (HCC): Primary | ICD-10-CM

## 2017-05-10 LAB
ALBUMIN SERPL-MCNC: 3.3 G/DL (ref 3.5–5)
ALBUMIN/GLOB SERPL: 1.1 G/DL
ALP SERPL-CCNC: 68 U/L (ref 38–126)
ALT SERPL W P-5'-P-CCNC: 18 U/L (ref 9–52)
ANION GAP SERPL CALCULATED.3IONS-SCNC: 7 MMOL/L
AST SERPL-CCNC: 21 U/L (ref 5–40)
AUTO MIXED CELLS #: 0.4 10*3/UL (ref 0.1–1.5)
AUTO MIXED CELLS %: 6.5 % (ref 0.2–15.1)
BILIRUB SERPL-MCNC: 0.4 MG/DL (ref 0.2–1.3)
BUN BLD-MCNC: 49 MG/DL (ref 7–26)
BUN/CREAT SERPL: 18.8 (ref 7–25)
CALCIUM SPEC-SCNC: 8.9 MG/DL (ref 8.4–10.2)
CHLORIDE SERPL-SCNC: 114 MMOL/L (ref 98–107)
CO2 SERPL-SCNC: 18 MMOL/L (ref 22–30)
CREAT BLD-MCNC: 2.6 MG/DL (ref 0.7–1.4)
ERYTHROCYTE [DISTWIDTH] IN BLOOD BY AUTOMATED COUNT: 14.1 % (ref 11.5–14.5)
GFR SERPL CREATININE-BSD FRML MDRD: 18 ML/MIN/1.73
GLOBULIN UR ELPH-MCNC: 3.1 GM/DL
GLUCOSE BLD-MCNC: 92 MG/DL (ref 75–110)
HCT VFR BLD AUTO: 33.7 % (ref 37–47)
HGB BLD-MCNC: 10.4 G/DL (ref 12–16)
LYMPHOCYTES # BLD AUTO: 2.1 10*3/MM3 (ref 0.8–7)
LYMPHOCYTES NFR BLD AUTO: 37.2 % (ref 10–58.5)
MCH RBC QN AUTO: 30.5 PG (ref 27–31)
MCHC RBC AUTO-ENTMCNC: 30.9 G/DL (ref 33–37)
MCV RBC AUTO: 98.7 FL (ref 81–99)
NEUTROPHILS # BLD AUTO: 3.2 10*3/MM3 (ref 2–7.8)
NEUTROPHILS NFR BLD AUTO: 56.3 % (ref 37–92)
PLATELET # BLD AUTO: 201 10*3/MM3 (ref 130–400)
PMV BLD AUTO: 8.5 FL (ref 6–12)
POTASSIUM BLD-SCNC: 4 MMOL/L (ref 3.6–5)
PROT SERPL-MCNC: 6.4 G/DL (ref 6.3–8.2)
RBC # BLD AUTO: 3.41 10*6/MM3 (ref 4.2–5.4)
SODIUM BLD-SCNC: 139 MMOL/L (ref 137–145)
WBC NRBC COR # BLD: 5.7 10*3/MM3 (ref 4.8–10.8)

## 2017-05-10 PROCEDURE — 63510000001 EPOETIN ALFA PER 1000 UNITS: Performed by: INTERNAL MEDICINE

## 2017-05-10 PROCEDURE — 80053 COMPREHEN METABOLIC PANEL: CPT | Performed by: INTERNAL MEDICINE

## 2017-05-10 PROCEDURE — 36415 COLL VENOUS BLD VENIPUNCTURE: CPT | Performed by: INTERNAL MEDICINE

## 2017-05-10 PROCEDURE — 99214 OFFICE O/P EST MOD 30 MIN: CPT | Performed by: INTERNAL MEDICINE

## 2017-05-10 PROCEDURE — 96372 THER/PROPH/DIAG INJ SC/IM: CPT

## 2017-05-10 PROCEDURE — 25010000002 CYANOCOBALAMIN PER 1000 MCG: Performed by: INTERNAL MEDICINE

## 2017-05-10 PROCEDURE — 85025 COMPLETE CBC W/AUTO DIFF WBC: CPT | Performed by: INTERNAL MEDICINE

## 2017-05-10 RX ORDER — CYANOCOBALAMIN 1000 UG/ML
1000 INJECTION, SOLUTION INTRAMUSCULAR; SUBCUTANEOUS ONCE
Status: CANCELLED
Start: 2017-05-10 | End: 2017-05-10

## 2017-05-10 RX ORDER — CYANOCOBALAMIN 1000 UG/ML
1000 INJECTION, SOLUTION INTRAMUSCULAR; SUBCUTANEOUS ONCE
Status: COMPLETED | OUTPATIENT
Start: 2017-05-10 | End: 2017-05-10

## 2017-05-10 RX ADMIN — CYANOCOBALAMIN 1000 MCG: 1000 INJECTION, SOLUTION INTRAMUSCULAR at 14:47

## 2017-05-10 RX ADMIN — ERYTHROPOIETIN 40000 UNITS: 40000 INJECTION, SOLUTION INTRAVENOUS; SUBCUTANEOUS at 14:48

## 2017-06-07 ENCOUNTER — APPOINTMENT (OUTPATIENT)
Dept: ONCOLOGY | Facility: HOSPITAL | Age: 77
End: 2017-06-07
Attending: INTERNAL MEDICINE

## 2017-06-22 DIAGNOSIS — N18.9 CHRONIC KIDNEY DISEASE, UNSPECIFIED: Primary | ICD-10-CM

## 2017-06-23 ENCOUNTER — INFUSION (OUTPATIENT)
Dept: ONCOLOGY | Facility: HOSPITAL | Age: 77
End: 2017-06-23

## 2017-06-23 ENCOUNTER — OFFICE VISIT (OUTPATIENT)
Dept: ONCOLOGY | Facility: CLINIC | Age: 77
End: 2017-06-23

## 2017-06-23 ENCOUNTER — LAB (OUTPATIENT)
Dept: ONCOLOGY | Facility: CLINIC | Age: 77
End: 2017-06-23

## 2017-06-23 VITALS
DIASTOLIC BLOOD PRESSURE: 84 MMHG | SYSTOLIC BLOOD PRESSURE: 128 MMHG | RESPIRATION RATE: 16 BRPM | OXYGEN SATURATION: 93 % | TEMPERATURE: 98 F | HEIGHT: 62 IN | HEART RATE: 76 BPM

## 2017-06-23 VITALS
SYSTOLIC BLOOD PRESSURE: 147 MMHG | TEMPERATURE: 97.9 F | BODY MASS INDEX: 23.37 KG/M2 | RESPIRATION RATE: 20 BRPM | HEIGHT: 62 IN | HEART RATE: 67 BPM | WEIGHT: 127 LBS | OXYGEN SATURATION: 99 % | DIASTOLIC BLOOD PRESSURE: 72 MMHG

## 2017-06-23 DIAGNOSIS — D63.1 ANEMIA OF CHRONIC KIDNEY FAILURE, STAGE 3 (MODERATE) (HCC): Primary | ICD-10-CM

## 2017-06-23 DIAGNOSIS — D63.1 ANEMIA OF CHRONIC KIDNEY FAILURE, STAGE 3 (MODERATE) (HCC): ICD-10-CM

## 2017-06-23 DIAGNOSIS — N18.30 ANEMIA OF CHRONIC KIDNEY FAILURE, STAGE 3 (MODERATE) (HCC): ICD-10-CM

## 2017-06-23 DIAGNOSIS — N18.30 ANEMIA OF CHRONIC KIDNEY FAILURE, STAGE 3 (MODERATE) (HCC): Primary | ICD-10-CM

## 2017-06-23 DIAGNOSIS — D63.1 ANEMIA OF CHRONIC RENAL FAILURE, STAGE 3 (MODERATE) (HCC): ICD-10-CM

## 2017-06-23 DIAGNOSIS — N18.30 ANEMIA OF CHRONIC RENAL FAILURE, STAGE 3 (MODERATE) (HCC): ICD-10-CM

## 2017-06-23 DIAGNOSIS — N18.30 ANEMIA OF CHRONIC RENAL FAILURE, STAGE 3 (MODERATE) (HCC): Primary | ICD-10-CM

## 2017-06-23 DIAGNOSIS — D63.1 ANEMIA OF CHRONIC RENAL FAILURE, STAGE 3 (MODERATE) (HCC): Primary | ICD-10-CM

## 2017-06-23 LAB
ALBUMIN SERPL-MCNC: 3.4 G/DL (ref 3.5–5)
ALBUMIN/GLOB SERPL: 1.1 G/DL
ALP SERPL-CCNC: 62 U/L (ref 38–126)
ALT SERPL W P-5'-P-CCNC: 20 U/L (ref 9–52)
ANION GAP SERPL CALCULATED.3IONS-SCNC: 8 MMOL/L
AST SERPL-CCNC: 22 U/L (ref 5–40)
AUTO MIXED CELLS #: 0.3 10*3/UL (ref 0.1–1.5)
AUTO MIXED CELLS %: 4.9 % (ref 0.2–15.1)
BILIRUB SERPL-MCNC: 0.4 MG/DL (ref 0.2–1.3)
BUN BLD-MCNC: 47 MG/DL (ref 7–26)
BUN/CREAT SERPL: 20.4 (ref 7–25)
CALCIUM SPEC-SCNC: 8.6 MG/DL (ref 8.4–10.2)
CHLORIDE SERPL-SCNC: 112 MMOL/L (ref 98–107)
CO2 SERPL-SCNC: 19 MMOL/L (ref 22–30)
CREAT BLD-MCNC: 2.3 MG/DL (ref 0.7–1.4)
ERYTHROCYTE [DISTWIDTH] IN BLOOD BY AUTOMATED COUNT: 16.5 % (ref 11.5–14.5)
GFR SERPL CREATININE-BSD FRML MDRD: 21 ML/MIN/1.73
GLOBULIN UR ELPH-MCNC: 3.2 GM/DL
GLUCOSE BLD-MCNC: 99 MG/DL (ref 75–110)
HCT VFR BLD AUTO: 32.2 % (ref 37–47)
HGB BLD-MCNC: 9.9 G/DL (ref 12–16)
LYMPHOCYTES # BLD AUTO: 2.3 10*3/MM3 (ref 0.8–7)
LYMPHOCYTES NFR BLD AUTO: 35.9 % (ref 10–58.5)
MCH RBC QN AUTO: 30.3 PG (ref 27–31)
MCHC RBC AUTO-ENTMCNC: 30.7 G/DL (ref 33–37)
MCV RBC AUTO: 98.4 FL (ref 81–99)
NEUTROPHILS # BLD AUTO: 3.7 10*3/MM3 (ref 2–7.8)
NEUTROPHILS NFR BLD AUTO: 59.2 % (ref 37–92)
PLATELET # BLD AUTO: 192 10*3/MM3 (ref 130–400)
PMV BLD AUTO: 9.4 FL (ref 6–12)
POTASSIUM BLD-SCNC: 3.8 MMOL/L (ref 3.6–5)
PROT SERPL-MCNC: 6.6 G/DL (ref 6.3–8.2)
RBC # BLD AUTO: 3.27 10*6/MM3 (ref 4.2–5.4)
SODIUM BLD-SCNC: 139 MMOL/L (ref 137–145)
WBC NRBC COR # BLD: 6.3 10*3/MM3 (ref 4.8–10.8)

## 2017-06-23 PROCEDURE — 36415 COLL VENOUS BLD VENIPUNCTURE: CPT | Performed by: INTERNAL MEDICINE

## 2017-06-23 PROCEDURE — 96372 THER/PROPH/DIAG INJ SC/IM: CPT

## 2017-06-23 PROCEDURE — 80053 COMPREHEN METABOLIC PANEL: CPT | Performed by: INTERNAL MEDICINE

## 2017-06-23 PROCEDURE — 25010000002 CYANOCOBALAMIN PER 1000 MCG: Performed by: INTERNAL MEDICINE

## 2017-06-23 PROCEDURE — 99214 OFFICE O/P EST MOD 30 MIN: CPT | Performed by: INTERNAL MEDICINE

## 2017-06-23 PROCEDURE — 63510000001 EPOETIN ALFA PER 1000 UNITS: Performed by: INTERNAL MEDICINE

## 2017-06-23 PROCEDURE — 85025 COMPLETE CBC W/AUTO DIFF WBC: CPT | Performed by: INTERNAL MEDICINE

## 2017-06-23 RX ORDER — CYANOCOBALAMIN 1000 UG/ML
1000 INJECTION, SOLUTION INTRAMUSCULAR; SUBCUTANEOUS ONCE
Status: CANCELLED
Start: 2017-06-23 | End: 2017-06-23

## 2017-06-23 RX ORDER — CYANOCOBALAMIN 1000 UG/ML
1000 INJECTION, SOLUTION INTRAMUSCULAR; SUBCUTANEOUS ONCE
Status: COMPLETED | OUTPATIENT
Start: 2017-06-23 | End: 2017-06-23

## 2017-06-23 RX ADMIN — ERYTHROPOIETIN 40000 UNITS: 40000 INJECTION, SOLUTION INTRAVENOUS; SUBCUTANEOUS at 15:20

## 2017-06-23 RX ADMIN — CYANOCOBALAMIN 1000 MCG: 1000 INJECTION, SOLUTION INTRAMUSCULAR at 15:18

## 2017-06-23 NOTE — PROGRESS NOTES
Patient ID:   Susanne Valentine is a 77 y.o. female.  Referring Physician:   No referring provider defined for this encounter.  Primary Care Provider:  Saman Castorena DO    Assessment/Plan:  Assessment   Patient Active Problem List   Diagnosis   • Anemia of chronic kidney failure   • Anemia of chronic renal failure, stage 3 (moderate)   • B12 deficiency     Cancer Staging Information:  No matching staging information was found for the patient.    There are no diagnoses linked to this encounter.  The patient has a stable anemia from her chronic kidney disease, with the use of Procrit once a month as necessary.  We will hold the treatment today, and have her come back in 1 month with a repeat CBC, we will proceed with Procrit at 40,000 units subcutaneously if her hemoglobin is less than 10 g/DL.  Patient ID:   Susanne Valentine is a 77 y.o. female.  Referring Physician:   No referring provider defined for this encounter.  Primary Care Provider:  Saman Castorena DO    Assessment/Plan:  Assessment   Patient Active Problem List   Diagnosis   • Anemia of chronic kidney failure   • Anemia of chronic renal failure, stage 3 (moderate)   • B12 deficiency     Cancer Staging Information:  No matching staging information was found for the patient.    There are no diagnoses linked to this encounter.  The patient has a stable anemia from her chronic kidney disease, with the use of Procrit once a month as necessary.  We will hold the treatment today, and have her come back in 1 month with a repeat CBC, we will proceed with Procrit at 40,000 units subcutaneously if her hemoglobin is less than 10 g/DL.     Interval History:  The patient is here for follow-up and for possible consideration for Procrit.  She has no new complaints at this time and has been doing well.  Apparently her   5 years ago, and she reminded me that I am seeing her in the past when I was still here.  She did not get any Procrit on her last  "visit    Interval Notes:  The following portions of the patient's history were reviewed and updated as appropriate: allergies, current medications, past family history, past medical history, past social history, past surgical history and problem list.     History of Present Illness   76-year-old white lady with chronic kidney disease has an anemia and has been on ESAs since 2008. She has been tolerating it well and her anemia has been stable with a supplement.    Review of Systems   Constitutional: Negative.    HENT: Negative.    Eyes: Negative.    Respiratory: Negative.    Cardiovascular: Negative.    Gastrointestinal: Negative.    Endocrine: Negative.    Genitourinary: Negative.    Musculoskeletal: Negative.    Allergic/Immunologic: Negative.    Neurological: Negative.    Hematological: Negative.    Psychiatric/Behavioral: Negative.         Physical Exam:  Vital Signs for this encounter:  BSA: There is no height or weight on file to calculate BSA.  /84  Pulse 76  Temp 98 °F (36.7 °C) (Tympanic)   Resp 16  Ht 61.5\" (156.2 cm)  LMP  (LMP Unknown)  SpO2 93%    Physical Exam   Constitutional: She is oriented to person, place, and time. She appears well-developed and well-nourished. No distress.   HENT:   Head: Normocephalic and atraumatic.   Nose: Nose normal.   Mouth/Throat: Oropharynx is clear and moist. No oropharyngeal exudate.   Eyes: Conjunctivae and EOM are normal. Pupils are equal, round, and reactive to light. No scleral icterus.   Neck: Normal range of motion. Neck supple. No JVD present. No thyromegaly present.   Cardiovascular: Normal rate, regular rhythm and normal heart sounds.  Exam reveals no gallop and no friction rub.    Pulmonary/Chest: Effort normal and breath sounds normal. No respiratory distress. She has no wheezes. She has no rales. She exhibits no tenderness.   Abdominal: Soft. Bowel sounds are normal. She exhibits no distension. There is no tenderness. There is no guarding. "   Musculoskeletal: Normal range of motion. She exhibits no edema or tenderness.   Lymphadenopathy:     She has no cervical adenopathy.   Neurological: She is alert and oriented to person, place, and time. No cranial nerve deficit.   Skin: Skin is warm and dry.   Few purpuric spots, forearms   Psychiatric: She has a normal mood and affect. Her behavior is normal. Judgment and thought content normal.       Performance Status:  Asymptomatic    Results:  WBC   Date Value Ref Range Status   2017 6.30 4.80 - 10.80 10*3/mm3 Final     Hemoglobin   Date Value Ref Range Status   2017 9.9 (L) 12.0 - 16.0 g/dL Final     Hematocrit   Date Value Ref Range Status   2017 32.2 (L) 37.0 - 47.0 % Final     Platelets   Date Value Ref Range Status   2017 192 130 - 400 10*3/mm3 Final     Creatinine   Date Value Ref Range Status   2017 2.30 (H) 0.70 - 1.40 mg/dL Final     AST (SGOT)   Date Value Ref Range Status   2017 22 5 - 40 U/L Final        Interval History:  The patient is here for follow-up and for possible consideration for Procrit.  She has no new complaints at this time and has been doing well.  Apparently her   5 years ago, and she reminded me that I am seeing her in the past when I was still here.  She did not get any Procrit on her last visit    Interval Notes:  The following portions of the patient's history were reviewed and updated as appropriate: allergies, current medications, past family history, past medical history, past social history, past surgical history and problem list.     History of Present Illness   76-year-old white lady with chronic kidney disease has an anemia and has been on ESAs since . She has been tolerating it well and her anemia has been stable with a supplement.    Review of Systems   Constitutional: Negative.    HENT: Negative.    Eyes: Negative.    Respiratory: Negative.    Cardiovascular: Negative.    Gastrointestinal: Negative.    Endocrine:  "Negative.    Genitourinary: Negative.    Musculoskeletal: Negative.    Allergic/Immunologic: Negative.    Neurological: Negative.    Hematological: Negative.    Psychiatric/Behavioral: Negative.         Physical Exam:  Vital Signs for this encounter:  BSA: There is no height or weight on file to calculate BSA.  /84  Pulse 76  Temp 98 °F (36.7 °C) (Tympanic)   Resp 16  Ht 61.5\" (156.2 cm)  LMP  (LMP Unknown)  SpO2 93%    Physical Exam   Constitutional: She is oriented to person, place, and time. She appears well-developed and well-nourished. No distress.   HENT:   Head: Normocephalic and atraumatic.   Nose: Nose normal.   Mouth/Throat: Oropharynx is clear and moist. No oropharyngeal exudate.   Eyes: Conjunctivae and EOM are normal. Pupils are equal, round, and reactive to light. No scleral icterus.   Neck: Normal range of motion. Neck supple. No JVD present. No thyromegaly present.   Cardiovascular: Normal rate, regular rhythm and normal heart sounds.  Exam reveals no gallop and no friction rub.    Pulmonary/Chest: Effort normal and breath sounds normal. No respiratory distress. She has no wheezes. She has no rales. She exhibits no tenderness.   Abdominal: Soft. Bowel sounds are normal. She exhibits no distension. There is no tenderness. There is no guarding.   Musculoskeletal: Normal range of motion. She exhibits no edema or tenderness.   Lymphadenopathy:     She has no cervical adenopathy.   Neurological: She is alert and oriented to person, place, and time. No cranial nerve deficit.   Skin: Skin is warm and dry.   Few purpuric spots, forearms   Psychiatric: She has a normal mood and affect. Her behavior is normal. Judgment and thought content normal.       Performance Status:  Asymptomatic    Results:  WBC   Date Value Ref Range Status   06/23/2017 6.30 4.80 - 10.80 10*3/mm3 Final     Hemoglobin   Date Value Ref Range Status   06/23/2017 9.9 (L) 12.0 - 16.0 g/dL Final     Hematocrit   Date Value Ref " Range Status   06/23/2017 32.2 (L) 37.0 - 47.0 % Final     Platelets   Date Value Ref Range Status   06/23/2017 192 130 - 400 10*3/mm3 Final     Creatinine   Date Value Ref Range Status   06/23/2017 2.30 (H) 0.70 - 1.40 mg/dL Final     AST (SGOT)   Date Value Ref Range Status   06/23/2017 22 5 - 40 U/L Final     Anemia of chronic kidney disease.  Hemoglobin today is 9.9gm%,  continue Procrit.  Patient clinically asymptomatic.  Over the last 3 months for MCV of, therefore I am would check vitamin B12 and folic acid on today's draw and I have asked her to start taking oral folic acid 400 µg daily as well as start receiving vitamin B12 1000 µg subcutaneous every 4 weekly.  If the MCV remains high in another 3-4 months I may consider doing a bone marrow biopsy to diagnose myelodysplasia.  The last colonoscopy was done within the last 5 years and a verbal report given to me by the patient it was negative.

## 2017-06-30 ENCOUNTER — HOSPITAL ENCOUNTER (OUTPATIENT)
Dept: MAMMOGRAPHY | Facility: HOSPITAL | Age: 77
Discharge: HOME | End: 2017-06-30
Attending: SPECIALIST | Admitting: SPECIALIST

## 2017-06-30 ENCOUNTER — HOSPITAL ENCOUNTER (OUTPATIENT)
Dept: ULTRASOUND IMAGING | Facility: HOSPITAL | Age: 77
Discharge: HOME | End: 2017-06-30
Attending: SPECIALIST

## 2017-06-30 DIAGNOSIS — Z12.31 ENCOUNTER FOR SCREENING MAMMOGRAM FOR MALIGNANT NEOPLASM OF BREAST: ICD-10-CM

## 2017-06-30 DIAGNOSIS — N60.41 MAMMARY DUCT ECTASIA OF BREAST, RIGHT: ICD-10-CM

## 2017-06-30 PROCEDURE — 76641 ULTRASOUND BREAST COMPLETE: CPT

## 2017-06-30 PROCEDURE — G0202 SCR MAMMO BI INCL CAD: HCPCS

## 2017-06-30 PROCEDURE — 77063 BREAST TOMOSYNTHESIS BI: CPT

## 2017-07-06 ENCOUNTER — TRANSCRIBE ORDERS (OUTPATIENT)
Dept: ADMINISTRATIVE | Facility: HOSPITAL | Age: 77
End: 2017-07-06

## 2017-07-06 DIAGNOSIS — Z12.31 ENCOUNTER FOR SCREENING MAMMOGRAM FOR MALIGNANT NEOPLASM OF BREAST: Primary | ICD-10-CM

## 2017-07-21 DIAGNOSIS — N18.9 CHRONIC KIDNEY DISEASE, UNSPECIFIED: Primary | ICD-10-CM

## 2017-07-25 ENCOUNTER — OFFICE VISIT (OUTPATIENT)
Dept: ONCOLOGY | Facility: CLINIC | Age: 77
End: 2017-07-25

## 2017-07-25 ENCOUNTER — INFUSION (OUTPATIENT)
Dept: ONCOLOGY | Facility: HOSPITAL | Age: 77
End: 2017-07-25

## 2017-07-25 ENCOUNTER — LAB (OUTPATIENT)
Dept: ONCOLOGY | Facility: CLINIC | Age: 77
End: 2017-07-25

## 2017-07-25 VITALS
RESPIRATION RATE: 16 BRPM | TEMPERATURE: 97.5 F | HEIGHT: 62 IN | WEIGHT: 127.5 LBS | HEART RATE: 80 BPM | OXYGEN SATURATION: 97 % | DIASTOLIC BLOOD PRESSURE: 86 MMHG | BODY MASS INDEX: 23.46 KG/M2 | SYSTOLIC BLOOD PRESSURE: 138 MMHG

## 2017-07-25 VITALS
WEIGHT: 128.2 LBS | BODY MASS INDEX: 24.2 KG/M2 | OXYGEN SATURATION: 100 % | HEIGHT: 61 IN | SYSTOLIC BLOOD PRESSURE: 142 MMHG | RESPIRATION RATE: 20 BRPM | HEART RATE: 70 BPM | DIASTOLIC BLOOD PRESSURE: 82 MMHG | TEMPERATURE: 97.3 F

## 2017-07-25 DIAGNOSIS — D63.1 ANEMIA OF CHRONIC KIDNEY FAILURE, STAGE 3 (MODERATE) (HCC): ICD-10-CM

## 2017-07-25 DIAGNOSIS — N18.9 CHRONIC KIDNEY DISEASE, UNSPECIFIED: ICD-10-CM

## 2017-07-25 DIAGNOSIS — D63.1 ANEMIA OF CHRONIC RENAL FAILURE, STAGE 3 (MODERATE) (HCC): Primary | ICD-10-CM

## 2017-07-25 DIAGNOSIS — N18.30 ANEMIA OF CHRONIC KIDNEY FAILURE, STAGE 3 (MODERATE) (HCC): Primary | ICD-10-CM

## 2017-07-25 DIAGNOSIS — N18.9 CHRONIC KIDNEY DISEASE, UNSPECIFIED: Primary | ICD-10-CM

## 2017-07-25 DIAGNOSIS — N18.30 ANEMIA OF CHRONIC KIDNEY FAILURE, STAGE 3 (MODERATE) (HCC): ICD-10-CM

## 2017-07-25 DIAGNOSIS — N18.30 ANEMIA OF CHRONIC RENAL FAILURE, STAGE 3 (MODERATE) (HCC): ICD-10-CM

## 2017-07-25 DIAGNOSIS — D63.1 ANEMIA OF CHRONIC RENAL FAILURE, STAGE 3 (MODERATE) (HCC): ICD-10-CM

## 2017-07-25 DIAGNOSIS — N18.30 ANEMIA OF CHRONIC RENAL FAILURE, STAGE 3 (MODERATE) (HCC): Primary | ICD-10-CM

## 2017-07-25 DIAGNOSIS — D63.1 ANEMIA OF CHRONIC KIDNEY FAILURE, STAGE 3 (MODERATE) (HCC): Primary | ICD-10-CM

## 2017-07-25 LAB
ALBUMIN SERPL-MCNC: 3.7 G/DL (ref 3.5–5)
ALBUMIN/GLOB SERPL: 1.2 G/DL
ALP SERPL-CCNC: 75 U/L (ref 38–126)
ALT SERPL W P-5'-P-CCNC: 17 U/L (ref 9–52)
ANION GAP SERPL CALCULATED.3IONS-SCNC: 9 MMOL/L
AST SERPL-CCNC: 40 U/L (ref 5–40)
AUTO MIXED CELLS #: 0.4 10*3/MM3 (ref 0.1–1.5)
AUTO MIXED CELLS %: 5.8 % (ref 0.2–15.1)
BILIRUB SERPL-MCNC: 0.4 MG/DL (ref 0.2–1.3)
BUN BLD-MCNC: 45 MG/DL (ref 7–26)
BUN/CREAT SERPL: 17.3 (ref 7–25)
CALCIUM SPEC-SCNC: 9.5 MG/DL (ref 8.4–10.2)
CHLORIDE SERPL-SCNC: 113 MMOL/L (ref 98–107)
CO2 SERPL-SCNC: 18 MMOL/L (ref 22–30)
CREAT BLD-MCNC: 2.6 MG/DL (ref 0.7–1.4)
ERYTHROCYTE [DISTWIDTH] IN BLOOD BY AUTOMATED COUNT: 16.2 % (ref 11.5–14.5)
GFR SERPL CREATININE-BSD FRML MDRD: 18 ML/MIN/1.73
GLOBULIN UR ELPH-MCNC: 3 GM/DL
GLUCOSE BLD-MCNC: 100 MG/DL (ref 75–110)
HCT VFR BLD AUTO: 32.9 % (ref 37–47)
HGB BLD-MCNC: 10.1 G/DL (ref 12–16)
LYMPHOCYTES # BLD AUTO: 2.5 10*3/MM3 (ref 0.8–7)
LYMPHOCYTES NFR BLD AUTO: 38.5 % (ref 10–58.5)
MCH RBC QN AUTO: 30.5 PG (ref 27–31)
MCHC RBC AUTO-ENTMCNC: 30.7 G/DL (ref 33–37)
MCV RBC AUTO: 99.5 FL (ref 81–99)
NEUTROPHILS # BLD AUTO: 3.6 10*3/MM3 (ref 2–7.8)
NEUTROPHILS NFR BLD AUTO: 55.7 % (ref 37–92)
PLATELET # BLD AUTO: 237 10*3/MM3 (ref 130–400)
PMV BLD AUTO: 8.4 FL (ref 6–12)
POTASSIUM BLD-SCNC: 3.8 MMOL/L (ref 3.6–5)
PROT SERPL-MCNC: 6.7 G/DL (ref 6.3–8.2)
RBC # BLD AUTO: 3.31 10*6/MM3 (ref 4.2–5.4)
SODIUM BLD-SCNC: 140 MMOL/L (ref 137–145)
WBC NRBC COR # BLD: 6.4 10*3/MM3 (ref 4.8–10.8)

## 2017-07-25 PROCEDURE — 25010000002 CYANOCOBALAMIN PER 1000 MCG: Performed by: INTERNAL MEDICINE

## 2017-07-25 PROCEDURE — 80053 COMPREHEN METABOLIC PANEL: CPT | Performed by: INTERNAL MEDICINE

## 2017-07-25 PROCEDURE — 96372 THER/PROPH/DIAG INJ SC/IM: CPT

## 2017-07-25 PROCEDURE — 63510000001 EPOETIN ALFA PER 1000 UNITS: Performed by: INTERNAL MEDICINE

## 2017-07-25 PROCEDURE — 36415 COLL VENOUS BLD VENIPUNCTURE: CPT | Performed by: INTERNAL MEDICINE

## 2017-07-25 PROCEDURE — 85025 COMPLETE CBC W/AUTO DIFF WBC: CPT | Performed by: INTERNAL MEDICINE

## 2017-07-25 PROCEDURE — 99214 OFFICE O/P EST MOD 30 MIN: CPT | Performed by: INTERNAL MEDICINE

## 2017-07-25 RX ORDER — CYANOCOBALAMIN 1000 UG/ML
1000 INJECTION, SOLUTION INTRAMUSCULAR; SUBCUTANEOUS ONCE
Status: CANCELLED
Start: 2017-07-25 | End: 2017-07-25

## 2017-07-25 RX ORDER — CYANOCOBALAMIN 1000 UG/ML
1000 INJECTION, SOLUTION INTRAMUSCULAR; SUBCUTANEOUS ONCE
Status: COMPLETED | OUTPATIENT
Start: 2017-07-25 | End: 2017-07-25

## 2017-07-25 RX ADMIN — ERYTHROPOIETIN 40000 UNITS: 40000 INJECTION, SOLUTION INTRAVENOUS; SUBCUTANEOUS at 15:59

## 2017-07-25 RX ADMIN — CYANOCOBALAMIN 1000 MCG: 1000 INJECTION, SOLUTION INTRAMUSCULAR at 16:01

## 2017-07-25 NOTE — PROGRESS NOTES
Patient ID:   Susanne Valentine is a 77 y.o. female.  Referring Physician:   No referring provider defined for this encounter.  Primary Care Provider:  Saman Castorena DO    Assessment/Plan:  Assessment   Patient Active Problem List   Diagnosis   • Anemia of chronic kidney failure   • Anemia of chronic renal failure, stage 3 (moderate)   • B12 deficiency     Cancer Staging Information:  No matching staging information was found for the patient.    There are no diagnoses linked to this encounter.  The patient has a stable anemia from her chronic kidney disease, with the use of Procrit once a month as necessary.  We will hold the treatment today, and have her come back in 1 month with a repeat CBC, we will proceed with Procrit at 40,000 units subcutaneously if her hemoglobin is less than 10 g/DL.  Patient ID:   Susanne Valentine is a 77 y.o. female.  Referring Physician:   No referring provider defined for this encounter.  Primary Care Provider:  Saman Castorena DO    Assessment/Plan:  Assessment   Patient Active Problem List   Diagnosis   • Anemia of chronic kidney failure   • Anemia of chronic renal failure, stage 3 (moderate)   • B12 deficiency     Cancer Staging Information:  No matching staging information was found for the patient.    There are no diagnoses linked to this encounter.  The patient has a stable anemia from her chronic kidney disease, with the use of Procrit once a month as necessary.  We will hold the treatment today, and have her come back in 1 month with a repeat CBC, we will proceed with Procrit at 40,000 units subcutaneously if her hemoglobin is less than 10 g/DL.     Interval History:  The patient is here for follow-up and for possible consideration for Procrit.  She has no new complaints at this time and has been doing well.  Apparently her   5 years ago, and she reminded me that I am seeing her in the past when I was still here.  She did not get any Procrit on her last  "visit    Interval Notes:  The following portions of the patient's history were reviewed and updated as appropriate: allergies, current medications, past family history, past medical history, past social history, past surgical history and problem list.     History of Present Illness   76-year-old white lady with chronic kidney disease has an anemia and has been on ESAs since 2008. She has been tolerating it well and her anemia has been stable with a supplement.    Review of Systems   Constitutional: Negative.    HENT: Negative.    Eyes: Negative.    Respiratory: Negative.    Cardiovascular: Negative.    Gastrointestinal: Negative.    Endocrine: Negative.    Genitourinary: Negative.    Musculoskeletal: Negative.    Allergic/Immunologic: Negative.    Neurological: Negative.    Hematological: Negative.    Psychiatric/Behavioral: Negative.         Physical Exam:  Vital Signs for this encounter:  BSA: 1.58 meters squared  /86  Pulse 80  Temp 97.5 °F (36.4 °C) (Tympanic)   Resp 16  Ht 62\" (157.5 cm)  Wt 127 lb 8 oz (57.8 kg)  LMP  (LMP Unknown)  SpO2 97%  BMI 23.32 kg/m2    Physical Exam   Constitutional: She is oriented to person, place, and time. She appears well-developed and well-nourished. No distress.   HENT:   Head: Normocephalic and atraumatic.   Nose: Nose normal.   Mouth/Throat: Oropharynx is clear and moist. No oropharyngeal exudate.   Eyes: Conjunctivae and EOM are normal. Pupils are equal, round, and reactive to light. No scleral icterus.   Neck: Normal range of motion. Neck supple. No JVD present. No thyromegaly present.   Cardiovascular: Normal rate, regular rhythm and normal heart sounds.  Exam reveals no gallop and no friction rub.    Pulmonary/Chest: Effort normal and breath sounds normal. No respiratory distress. She has no wheezes. She has no rales. She exhibits no tenderness.   Abdominal: Soft. Bowel sounds are normal. She exhibits no distension. There is no tenderness. There is no " guarding.   Musculoskeletal: Normal range of motion. She exhibits no edema or tenderness.   Lymphadenopathy:     She has no cervical adenopathy.   Neurological: She is alert and oriented to person, place, and time. No cranial nerve deficit.   Skin: Skin is warm and dry.   Few purpuric spots, forearms   Psychiatric: She has a normal mood and affect. Her behavior is normal. Judgment and thought content normal.       Performance Status:  Asymptomatic    Results:  WBC   Date Value Ref Range Status   2017 6.40 4.80 - 10.80 10*3/mm3 Final     Hemoglobin   Date Value Ref Range Status   2017 10.1 (L) 12.0 - 16.0 g/dL Final     Hematocrit   Date Value Ref Range Status   2017 32.9 (L) 37.0 - 47.0 % Final     Platelets   Date Value Ref Range Status   2017 237 130 - 400 10*3/mm3 Final     Creatinine   Date Value Ref Range Status   2017 2.30 (H) 0.70 - 1.40 mg/dL Final     AST (SGOT)   Date Value Ref Range Status   2017 22 5 - 40 U/L Final        Interval History:  The patient is here for follow-up and for possible consideration for Procrit.  She has no new complaints at this time and has been doing well.  Apparently her   5 years ago, and she reminded me that I am seeing her in the past when I was still here.  She did not get any Procrit on her last visit    Interval Notes:  The following portions of the patient's history were reviewed and updated as appropriate: allergies, current medications, past family history, past medical history, past social history, past surgical history and problem list.     History of Present Illness   76-year-old white lady with chronic kidney disease has an anemia and has been on ESAs since . She has been tolerating it well and her anemia has been stable with a supplement.    Review of Systems   Constitutional: Negative.    HENT: Negative.    Eyes: Negative.    Respiratory: Negative.    Cardiovascular: Negative.    Gastrointestinal: Negative.   "  Endocrine: Negative.    Genitourinary: Negative.    Musculoskeletal: Negative.    Allergic/Immunologic: Negative.    Neurological: Negative.    Hematological: Negative.    Psychiatric/Behavioral: Negative.         Physical Exam:  Vital Signs for this encounter:  BSA: 1.58 meters squared  /86  Pulse 80  Temp 97.5 °F (36.4 °C) (Tympanic)   Resp 16  Ht 62\" (157.5 cm)  Wt 127 lb 8 oz (57.8 kg)  LMP  (LMP Unknown)  SpO2 97%  BMI 23.32 kg/m2    Physical Exam   Constitutional: She is oriented to person, place, and time. She appears well-developed and well-nourished. No distress.   HENT:   Head: Normocephalic and atraumatic.   Nose: Nose normal.   Mouth/Throat: Oropharynx is clear and moist. No oropharyngeal exudate.   Eyes: Conjunctivae and EOM are normal. Pupils are equal, round, and reactive to light. No scleral icterus.   Neck: Normal range of motion. Neck supple. No JVD present. No thyromegaly present.   Cardiovascular: Normal rate, regular rhythm and normal heart sounds.  Exam reveals no gallop and no friction rub.    Pulmonary/Chest: Effort normal and breath sounds normal. No respiratory distress. She has no wheezes. She has no rales. She exhibits no tenderness.   Abdominal: Soft. Bowel sounds are normal. She exhibits no distension. There is no tenderness. There is no guarding.   Musculoskeletal: Normal range of motion. She exhibits no edema or tenderness.   Lymphadenopathy:     She has no cervical adenopathy.   Neurological: She is alert and oriented to person, place, and time. No cranial nerve deficit.   Skin: Skin is warm and dry.   Few purpuric spots, forearms   Psychiatric: She has a normal mood and affect. Her behavior is normal. Judgment and thought content normal.       Performance Status:  Asymptomatic    Results:  WBC   Date Value Ref Range Status   07/25/2017 6.40 4.80 - 10.80 10*3/mm3 Final     Hemoglobin   Date Value Ref Range Status   07/25/2017 10.1 (L) 12.0 - 16.0 g/dL Final "     Hematocrit   Date Value Ref Range Status   07/25/2017 32.9 (L) 37.0 - 47.0 % Final     Platelets   Date Value Ref Range Status   07/25/2017 237 130 - 400 10*3/mm3 Final     Creatinine   Date Value Ref Range Status   06/23/2017 2.30 (H) 0.70 - 1.40 mg/dL Final     AST (SGOT)   Date Value Ref Range Status   06/23/2017 22 5 - 40 U/L Final     Anemia of chronic kidney disease.  Hemoglobin today is 10.1gm%,  continue Procrit.  Patient clinically asymptomatic.  Over the last 3 months for MCV of, therefore I am would check vitamin B12 and folic acid on today's draw and I have asked her to start taking oral folic acid 400 µg daily as well as start receiving vitamin B12 1000 µg subcutaneous every 4 weekly.  If the MCV remains high in another 3-4 months I may consider doing a bone marrow biopsy to diagnose myelodysplasia.  The last colonoscopy was done within the last 5 years and a verbal report given to me by the patient it was negative.

## 2017-07-25 NOTE — PROGRESS NOTES
7/25/17 4767 Ambar RN called to confirm patient is to receive both procrit and vit B12 and that orders were placed and signed at this time. Cammie Hope RN

## 2017-07-26 LAB
FERRITIN SERPL-MCNC: 55.3 NG/ML (ref 11.1–264)
IRON SATN MFR SERPL: 44 % (ref 20–45)
IRON SERPL-MCNC: 130 MCG/DL (ref 42–180)
TIBC SERPL-MCNC: 297 MCG/DL (ref 225–420)
UIBC SERPL-MCNC: 167 MCG/DL

## 2017-08-06 ENCOUNTER — HOSPITAL ENCOUNTER (EMERGENCY)
Age: 77
Discharge: HOME | End: 2017-08-06
Payer: MEDICARE

## 2017-08-06 VITALS
WEIGHT: 130 LBS | TEMPERATURE: 97.9 F | BODY MASS INDEX: 23.92 KG/M2 | HEIGHT: 62 IN | OXYGEN SATURATION: 97 % | RESPIRATION RATE: 18 BRPM | HEART RATE: 83 BPM | SYSTOLIC BLOOD PRESSURE: 141 MMHG | DIASTOLIC BLOOD PRESSURE: 83 MMHG

## 2017-08-06 DIAGNOSIS — M79.674 PAIN OF TOE OF RIGHT FOOT: Primary | ICD-10-CM

## 2017-08-06 PROCEDURE — 99282 EMERGENCY DEPT VISIT SF MDM: CPT | Performed by: PHYSICIAN ASSISTANT

## 2017-08-06 PROCEDURE — 99283 EMERGENCY DEPT VISIT LOW MDM: CPT

## 2017-08-06 RX ORDER — NAPROXEN 500 MG/1
500 TABLET ORAL 2 TIMES DAILY
Qty: 20 TABLET | Refills: 0 | Status: SHIPPED | OUTPATIENT
Start: 2017-08-06 | End: 2017-08-10

## 2017-08-06 ASSESSMENT — PAIN SCALES - GENERAL: PAINLEVEL_OUTOF10: 3

## 2017-08-07 DIAGNOSIS — M79.674 PAIN AND SWELLING OF TOE, RIGHT: ICD-10-CM

## 2017-08-07 DIAGNOSIS — E03.9 UNSPECIFIED HYPOTHYROIDISM: ICD-10-CM

## 2017-08-07 DIAGNOSIS — M79.89 PAIN AND SWELLING OF TOE, RIGHT: ICD-10-CM

## 2017-08-07 DIAGNOSIS — M79.89 PAIN AND SWELLING OF TOE, RIGHT: Primary | ICD-10-CM

## 2017-08-07 DIAGNOSIS — M79.674 PAIN AND SWELLING OF TOE, RIGHT: Primary | ICD-10-CM

## 2017-08-07 LAB
RHEUMATOID FACTOR: <10 IU/ML
TSH SERPL DL<=0.05 MIU/L-ACNC: 2.7 UIU/ML (ref 0.27–4.2)

## 2017-08-09 ASSESSMENT — ENCOUNTER SYMPTOMS
WHEEZING: 0
BACK PAIN: 0
COUGH: 0
ABDOMINAL PAIN: 0
DIARRHEA: 0
VOMITING: 0
SHORTNESS OF BREATH: 0
CONSTIPATION: 0
NAUSEA: 0

## 2017-08-10 ENCOUNTER — OFFICE VISIT (OUTPATIENT)
Dept: PRIMARY CARE CLINIC | Age: 77
End: 2017-08-10
Payer: MEDICARE

## 2017-08-10 VITALS
HEART RATE: 80 BPM | WEIGHT: 130 LBS | SYSTOLIC BLOOD PRESSURE: 138 MMHG | BODY MASS INDEX: 23.92 KG/M2 | DIASTOLIC BLOOD PRESSURE: 84 MMHG | OXYGEN SATURATION: 96 % | TEMPERATURE: 96.6 F | HEIGHT: 62 IN

## 2017-08-10 DIAGNOSIS — E03.9 ACQUIRED HYPOTHYROIDISM: ICD-10-CM

## 2017-08-10 DIAGNOSIS — M79.674 PAIN AND SWELLING OF TOE, RIGHT: Primary | ICD-10-CM

## 2017-08-10 DIAGNOSIS — M79.89 PAIN AND SWELLING OF TOE, RIGHT: ICD-10-CM

## 2017-08-10 DIAGNOSIS — N18.4 CKD (CHRONIC KIDNEY DISEASE), STAGE IV (HCC): ICD-10-CM

## 2017-08-10 DIAGNOSIS — M79.89 PAIN AND SWELLING OF TOE, RIGHT: Primary | ICD-10-CM

## 2017-08-10 DIAGNOSIS — M79.674 PAIN AND SWELLING OF TOE, RIGHT: ICD-10-CM

## 2017-08-10 DIAGNOSIS — M10.371 ACUTE GOUT DUE TO RENAL IMPAIRMENT INVOLVING TOE OF RIGHT FOOT: ICD-10-CM

## 2017-08-10 LAB
ALBUMIN SERPL-MCNC: 3.6 G/DL (ref 3.5–5.2)
ALP BLD-CCNC: 69 U/L (ref 35–104)
ALT SERPL-CCNC: 6 U/L (ref 5–33)
ANION GAP SERPL CALCULATED.3IONS-SCNC: 21 MMOL/L (ref 7–19)
AST SERPL-CCNC: 16 U/L (ref 5–32)
BACTERIA: NEGATIVE /HPF
BILIRUB SERPL-MCNC: <0.2 MG/DL (ref 0.2–1.2)
BILIRUBIN URINE: NEGATIVE
BLOOD, URINE: ABNORMAL
BUN BLDV-MCNC: 41 MG/DL (ref 8–23)
C-REACTIVE PROTEIN: 0.12 MG/DL (ref 0–0.5)
CALCIUM SERPL-MCNC: 9.3 MG/DL (ref 8.8–10.2)
CHLORIDE BLD-SCNC: 102 MMOL/L (ref 98–111)
CLARITY: CLEAR
CO2: 18 MMOL/L (ref 22–29)
COLOR: YELLOW
CREAT SERPL-MCNC: 2.2 MG/DL (ref 0.5–0.9)
CREATININE URINE: 52.4 MG/DL (ref 4.2–622)
EPITHELIAL CELLS, UA: 2 /HPF (ref 0–5)
GFR NON-AFRICAN AMERICAN: 22
GLUCOSE BLD-MCNC: 87 MG/DL (ref 74–109)
GLUCOSE URINE: 100 MG/DL
HCT VFR BLD CALC: 36.3 % (ref 37–47)
HEMOGLOBIN: 11.1 G/DL (ref 12–16)
HYALINE CASTS: 9 /HPF (ref 0–8)
KETONES, URINE: NEGATIVE MG/DL
LEUKOCYTE ESTERASE, URINE: NEGATIVE
MCH RBC QN AUTO: 31.1 PG (ref 27–31)
MCHC RBC AUTO-ENTMCNC: 30.6 G/DL (ref 33–37)
MCV RBC AUTO: 101.7 FL (ref 81–99)
NITRITE, URINE: NEGATIVE
PDW BLD-RTO: 14.6 % (ref 11.5–14.5)
PH UA: 5.5
PLATELET # BLD: 235 K/UL (ref 130–400)
PMV BLD AUTO: 10.7 FL (ref 9.4–12.3)
POTASSIUM SERPL-SCNC: 3.6 MMOL/L (ref 3.5–5)
PROTEIN PROTEIN: 431 MG/DL (ref 15–45)
PROTEIN UA: 300 MG/DL
RBC # BLD: 3.57 M/UL (ref 4.2–5.4)
RBC UA: 4 /HPF (ref 0–4)
SEDIMENTATION RATE, ERYTHROCYTE: 34 MM/HR (ref 0–25)
SODIUM BLD-SCNC: 141 MMOL/L (ref 136–145)
SPECIFIC GRAVITY UA: 1.01
TOTAL PROTEIN: 6.6 G/DL (ref 6.6–8.7)
TSH SERPL DL<=0.05 MIU/L-ACNC: 3.04 UIU/ML (ref 0.27–4.2)
URIC ACID, SERUM: 7.1 MG/DL (ref 2.4–5.7)
UROBILINOGEN, URINE: 0.2 E.U./DL
WBC # BLD: 5.7 K/UL (ref 4.8–10.8)
WBC UA: 2 /HPF (ref 0–5)

## 2017-08-10 PROCEDURE — 1036F TOBACCO NON-USER: CPT | Performed by: INTERNAL MEDICINE

## 2017-08-10 PROCEDURE — 1090F PRES/ABSN URINE INCON ASSESS: CPT | Performed by: INTERNAL MEDICINE

## 2017-08-10 PROCEDURE — G8420 CALC BMI NORM PARAMETERS: HCPCS | Performed by: INTERNAL MEDICINE

## 2017-08-10 PROCEDURE — 99214 OFFICE O/P EST MOD 30 MIN: CPT | Performed by: INTERNAL MEDICINE

## 2017-08-10 PROCEDURE — 4040F PNEUMOC VAC/ADMIN/RCVD: CPT | Performed by: INTERNAL MEDICINE

## 2017-08-10 PROCEDURE — 1123F ACP DISCUSS/DSCN MKR DOCD: CPT | Performed by: INTERNAL MEDICINE

## 2017-08-10 PROCEDURE — G8399 PT W/DXA RESULTS DOCUMENT: HCPCS | Performed by: INTERNAL MEDICINE

## 2017-08-10 PROCEDURE — G8427 DOCREV CUR MEDS BY ELIG CLIN: HCPCS | Performed by: INTERNAL MEDICINE

## 2017-08-10 RX ORDER — PREDNISONE 20 MG/1
20 TABLET ORAL DAILY
Qty: 7 TABLET | Refills: 0 | Status: SHIPPED | OUTPATIENT
Start: 2017-08-10 | End: 2017-08-17

## 2017-08-10 ASSESSMENT — ENCOUNTER SYMPTOMS
CONSTIPATION: 0
COUGH: 0
VOMITING: 0
DIARRHEA: 0
NAUSEA: 0
SHORTNESS OF BREATH: 0

## 2017-08-21 ENCOUNTER — OFFICE VISIT (OUTPATIENT)
Dept: PRIMARY CARE CLINIC | Age: 77
End: 2017-08-21
Payer: MEDICARE

## 2017-08-21 VITALS
TEMPERATURE: 98.7 F | SYSTOLIC BLOOD PRESSURE: 122 MMHG | OXYGEN SATURATION: 94 % | DIASTOLIC BLOOD PRESSURE: 78 MMHG | RESPIRATION RATE: 18 BRPM | HEART RATE: 90 BPM | BODY MASS INDEX: 23.19 KG/M2 | HEIGHT: 62 IN | WEIGHT: 126 LBS

## 2017-08-21 DIAGNOSIS — M10.9 PODAGRA: Primary | ICD-10-CM

## 2017-08-21 PROCEDURE — G8427 DOCREV CUR MEDS BY ELIG CLIN: HCPCS | Performed by: INTERNAL MEDICINE

## 2017-08-21 PROCEDURE — G8420 CALC BMI NORM PARAMETERS: HCPCS | Performed by: INTERNAL MEDICINE

## 2017-08-21 PROCEDURE — G8399 PT W/DXA RESULTS DOCUMENT: HCPCS | Performed by: INTERNAL MEDICINE

## 2017-08-21 PROCEDURE — 99213 OFFICE O/P EST LOW 20 MIN: CPT | Performed by: INTERNAL MEDICINE

## 2017-08-21 PROCEDURE — 4040F PNEUMOC VAC/ADMIN/RCVD: CPT | Performed by: INTERNAL MEDICINE

## 2017-08-21 PROCEDURE — 1036F TOBACCO NON-USER: CPT | Performed by: INTERNAL MEDICINE

## 2017-08-21 PROCEDURE — 1123F ACP DISCUSS/DSCN MKR DOCD: CPT | Performed by: INTERNAL MEDICINE

## 2017-08-21 PROCEDURE — 1090F PRES/ABSN URINE INCON ASSESS: CPT | Performed by: INTERNAL MEDICINE

## 2017-08-21 RX ORDER — PREDNISONE 20 MG/1
20 TABLET ORAL DAILY
Qty: 10 TABLET | Refills: 0 | Status: SHIPPED | OUTPATIENT
Start: 2017-08-21 | End: 2017-08-31

## 2017-08-21 RX ORDER — ALLOPURINOL 100 MG/1
100 TABLET ORAL DAILY
Qty: 30 TABLET | Refills: 3 | Status: SHIPPED | OUTPATIENT
Start: 2017-08-21 | End: 2018-06-01 | Stop reason: ALTCHOICE

## 2017-08-21 ASSESSMENT — ENCOUNTER SYMPTOMS: COLOR CHANGE: 1

## 2017-08-28 DIAGNOSIS — N18.9 CHRONIC KIDNEY DISEASE, UNSPECIFIED: Primary | ICD-10-CM

## 2017-08-29 ENCOUNTER — OFFICE VISIT (OUTPATIENT)
Dept: ONCOLOGY | Facility: CLINIC | Age: 77
End: 2017-08-29

## 2017-08-29 ENCOUNTER — LAB (OUTPATIENT)
Dept: LAB | Facility: HOSPITAL | Age: 77
End: 2017-08-29

## 2017-08-29 ENCOUNTER — INFUSION (OUTPATIENT)
Dept: ONCOLOGY | Facility: HOSPITAL | Age: 77
End: 2017-08-29
Attending: INTERNAL MEDICINE

## 2017-08-29 VITALS
RESPIRATION RATE: 17 BRPM | BODY MASS INDEX: 24.17 KG/M2 | TEMPERATURE: 97.2 F | SYSTOLIC BLOOD PRESSURE: 138 MMHG | HEIGHT: 61 IN | WEIGHT: 128 LBS | DIASTOLIC BLOOD PRESSURE: 80 MMHG | OXYGEN SATURATION: 96 % | HEART RATE: 72 BPM

## 2017-08-29 VITALS
OXYGEN SATURATION: 100 % | BODY MASS INDEX: 24.48 KG/M2 | RESPIRATION RATE: 20 BRPM | HEART RATE: 74 BPM | DIASTOLIC BLOOD PRESSURE: 69 MMHG | WEIGHT: 133 LBS | SYSTOLIC BLOOD PRESSURE: 122 MMHG | TEMPERATURE: 97.7 F | HEIGHT: 62 IN

## 2017-08-29 DIAGNOSIS — D63.1 ANEMIA OF CHRONIC KIDNEY FAILURE, STAGE 3 (MODERATE) (HCC): ICD-10-CM

## 2017-08-29 DIAGNOSIS — N18.30 ANEMIA OF CHRONIC RENAL FAILURE, STAGE 3 (MODERATE) (HCC): ICD-10-CM

## 2017-08-29 DIAGNOSIS — N18.30 ANEMIA OF CHRONIC RENAL FAILURE, STAGE 3 (MODERATE) (HCC): Primary | ICD-10-CM

## 2017-08-29 DIAGNOSIS — N18.30 ANEMIA OF CHRONIC KIDNEY FAILURE, STAGE 3 (MODERATE) (HCC): ICD-10-CM

## 2017-08-29 DIAGNOSIS — D63.1 ANEMIA OF CHRONIC RENAL FAILURE, STAGE 3 (MODERATE) (HCC): ICD-10-CM

## 2017-08-29 DIAGNOSIS — N18.30 ANEMIA OF CHRONIC KIDNEY FAILURE, STAGE 3 (MODERATE) (HCC): Primary | ICD-10-CM

## 2017-08-29 DIAGNOSIS — D63.1 ANEMIA OF CHRONIC RENAL FAILURE, STAGE 3 (MODERATE) (HCC): Primary | ICD-10-CM

## 2017-08-29 DIAGNOSIS — D63.1 ANEMIA OF CHRONIC KIDNEY FAILURE, STAGE 3 (MODERATE) (HCC): Primary | ICD-10-CM

## 2017-08-29 LAB
ALBUMIN SERPL-MCNC: 3.5 G/DL (ref 3.5–5)
ALBUMIN/GLOB SERPL: 1.3 G/DL (ref 1.1–2.5)
ALP SERPL-CCNC: 68 U/L (ref 24–120)
ALT SERPL W P-5'-P-CCNC: 18 U/L (ref 0–54)
ANION GAP SERPL CALCULATED.3IONS-SCNC: 10 MMOL/L (ref 4–13)
AST SERPL-CCNC: 18 U/L (ref 7–45)
AUTO MIXED CELLS #: 0.2 10*3/MM3 (ref 0.1–2.6)
AUTO MIXED CELLS %: 1.8 % (ref 0.1–24)
BILIRUB SERPL-MCNC: 0.2 MG/DL (ref 0.1–1)
BUN BLD-MCNC: 51 MG/DL (ref 5–21)
BUN/CREAT SERPL: 19.9
CALCIUM SPEC-SCNC: 8.6 MG/DL (ref 8.4–10.4)
CHLORIDE SERPL-SCNC: 110 MMOL/L (ref 98–110)
CO2 SERPL-SCNC: 18 MMOL/L (ref 24–31)
CREAT BLD-MCNC: 2.56 MG/DL (ref 0.5–1.4)
ERYTHROCYTE [DISTWIDTH] IN BLOOD BY AUTOMATED COUNT: 14 % (ref 12–15)
GFR SERPL CREATININE-BSD FRML MDRD: 18 ML/MIN/1.73
GLOBULIN UR ELPH-MCNC: 2.8 GM/DL
GLUCOSE BLD-MCNC: 106 MG/DL (ref 70–100)
HCT VFR BLD AUTO: 33.1 % (ref 37–47)
HGB BLD-MCNC: 10.9 G/DL (ref 12–16)
HOLD SPECIMEN: NORMAL
LYMPHOCYTES # BLD AUTO: 0.9 10*3/MM3 (ref 0.8–7)
LYMPHOCYTES NFR BLD AUTO: 10.1 % (ref 15–45)
MCH RBC QN AUTO: 31.1 PG (ref 28–32)
MCHC RBC AUTO-ENTMCNC: 32.9 G/DL (ref 33–36)
MCV RBC AUTO: 94.3 FL (ref 82–98)
NEUTROPHILS # BLD AUTO: 8.3 10*3/MM3 (ref 1.5–8.3)
NEUTROPHILS NFR BLD AUTO: 88.1 % (ref 39–78)
PLATELET # BLD AUTO: 247 10*3/MM3 (ref 130–400)
PMV BLD AUTO: 9.6 FL (ref 6–12)
POTASSIUM BLD-SCNC: 3.4 MMOL/L (ref 3.5–5.3)
PROT SERPL-MCNC: 6.3 G/DL (ref 6.3–8.7)
RBC # BLD AUTO: 3.51 10*6/MM3 (ref 4.2–5.4)
SODIUM BLD-SCNC: 138 MMOL/L (ref 135–145)
WBC NRBC COR # BLD: 9.4 10*3/MM3 (ref 4.8–10.8)

## 2017-08-29 PROCEDURE — 63510000001 EPOETIN ALFA PER 1000 UNITS: Performed by: INTERNAL MEDICINE

## 2017-08-29 PROCEDURE — 25010000002 CYANOCOBALAMIN PER 1000 MCG: Performed by: INTERNAL MEDICINE

## 2017-08-29 PROCEDURE — 99214 OFFICE O/P EST MOD 30 MIN: CPT | Performed by: INTERNAL MEDICINE

## 2017-08-29 PROCEDURE — 85025 COMPLETE CBC W/AUTO DIFF WBC: CPT

## 2017-08-29 PROCEDURE — 80053 COMPREHEN METABOLIC PANEL: CPT | Performed by: INTERNAL MEDICINE

## 2017-08-29 PROCEDURE — 96372 THER/PROPH/DIAG INJ SC/IM: CPT

## 2017-08-29 PROCEDURE — 36415 COLL VENOUS BLD VENIPUNCTURE: CPT

## 2017-08-29 RX ORDER — CYANOCOBALAMIN 1000 UG/ML
1000 INJECTION, SOLUTION INTRAMUSCULAR; SUBCUTANEOUS ONCE
Status: CANCELLED
Start: 2017-08-29 | End: 2017-08-29

## 2017-08-29 RX ORDER — ALLOPURINOL 100 MG/1
100 TABLET ORAL DAILY
COMMUNITY
End: 2018-03-28

## 2017-08-29 RX ORDER — CYANOCOBALAMIN 1000 UG/ML
1000 INJECTION, SOLUTION INTRAMUSCULAR; SUBCUTANEOUS ONCE
Status: COMPLETED | OUTPATIENT
Start: 2017-08-29 | End: 2017-08-29

## 2017-08-29 RX ORDER — PREDNISONE 20 MG/1
20 TABLET ORAL DAILY
COMMUNITY
End: 2017-09-28 | Stop reason: ALTCHOICE

## 2017-08-29 RX ADMIN — ERYTHROPOIETIN 40000 UNITS: 40000 INJECTION, SOLUTION INTRAVENOUS; SUBCUTANEOUS at 14:25

## 2017-08-29 RX ADMIN — CYANOCOBALAMIN 1000 MCG: 1000 INJECTION, SOLUTION INTRAMUSCULAR at 14:22

## 2017-08-29 NOTE — PROGRESS NOTES
Patient ID:   Susanne Valentine is a 77 y.o. female.  Referring Physician:   Saman Castorena [address]  [place] [address]  Primary Care Provider:  Saman Castorena DO    Assessment/Plan:  Assessment   Patient Active Problem List   Diagnosis   • Anemia of chronic kidney failure   • Anemia of chronic renal failure, stage 3 (moderate)   • B12 deficiency     Cancer Staging Information:  No matching staging information was found for the patient.    There are no diagnoses linked to this encounter.  The patient has a stable anemia from her chronic kidney disease, with the use of Procrit once a month as necessary.  We will hold the treatment today, and have her come back in 1 month with a repeat CBC, we will proceed with Procrit at 40,000 units subcutaneously if her hemoglobin is less than 10 g/DL.  Patient ID:   Susanne Valentine is a 77 y.o. female.  Referring Physician:   Saman Castorena [address]  [place] [address]  Primary Care Provider:  Saman Castorena DO    Assessment/Plan:  Assessment   Patient Active Problem List   Diagnosis   • Anemia of chronic kidney failure   • Anemia of chronic renal failure, stage 3 (moderate)   • B12 deficiency     Cancer Staging Information:  No matching staging information was found for the patient.    There are no diagnoses linked to this encounter.  The patient has a stable anemia from her chronic kidney disease, with the use of Procrit once a month as necessary.  We will hold the treatment today, and have her come back in 1 month with a repeat CBC, we will proceed with Procrit at 40,000 units subcutaneously if her hemoglobin is less than 10 g/DL.     Interval History:  The patient is here for follow-up and for possible consideration for Procrit.  She has no new complaints at this time and has been doing well.  Apparently her   5 years ago, and she reminded me that I am seeing her in the past when I was still here.   She did not get any Procrit on her last visit    Interval Notes:  The following portions of the patient's history were reviewed and updated as appropriate: allergies, current medications, past family history, past medical history, past social history, past surgical history and problem list.     History of Present Illness   76-year-old white lady with chronic kidney disease has an anemia and has been on ESAs since 2008. She has been tolerating it well and her anemia has been stable with a supplement.    Review of Systems   Constitutional: Negative.    HENT: Negative.    Eyes: Negative.    Respiratory: Negative.    Cardiovascular: Negative.    Gastrointestinal: Negative.    Endocrine: Negative.    Genitourinary: Negative.    Musculoskeletal: Negative.    Allergic/Immunologic: Negative.    Neurological: Negative.    Hematological: Negative.    Psychiatric/Behavioral: Negative.         Physical Exam:  Vital Signs for this encounter:  BSA: There is no height or weight on file to calculate BSA.  There were no vitals taken for this visit.    Physical Exam   Constitutional: She is oriented to person, place, and time. She appears well-developed and well-nourished. No distress.   HENT:   Head: Normocephalic and atraumatic.   Nose: Nose normal.   Mouth/Throat: Oropharynx is clear and moist. No oropharyngeal exudate.   Eyes: Conjunctivae and EOM are normal. Pupils are equal, round, and reactive to light. No scleral icterus.   Neck: Normal range of motion. Neck supple. No JVD present. No thyromegaly present.   Cardiovascular: Normal rate, regular rhythm and normal heart sounds.  Exam reveals no gallop and no friction rub.    Pulmonary/Chest: Effort normal and breath sounds normal. No respiratory distress. She has no wheezes. She has no rales. She exhibits no tenderness.   Abdominal: Soft. Bowel sounds are normal. She exhibits no distension. There is no tenderness. There is no guarding.   Musculoskeletal: Normal range of motion.  She exhibits no edema or tenderness.   Lymphadenopathy:     She has no cervical adenopathy.   Neurological: She is alert and oriented to person, place, and time. No cranial nerve deficit.   Skin: Skin is warm and dry.   Few purpuric spots, forearms   Psychiatric: She has a normal mood and affect. Her behavior is normal. Judgment and thought content normal.       Performance Status:  Asymptomatic    Results:  WBC   Date Value Ref Range Status   2017 9.40 4.80 - 10.80 10*3/mm3 Final     Hemoglobin   Date Value Ref Range Status   2017 10.9 (L) 12.0 - 16.0 g/dL Final     Hematocrit   Date Value Ref Range Status   2017 33.1 (L) 37.0 - 47.0 % Final     Platelets   Date Value Ref Range Status   2017 247 130 - 400 10*3/mm3 Final     Creatinine   Date Value Ref Range Status   2017 2.60 (H) 0.70 - 1.40 mg/dL Final     AST (SGOT)   Date Value Ref Range Status   2017 40 5 - 40 U/L Final        Interval History:  The patient is here for follow-up and for possible consideration for Procrit.  She has no new complaints at this time and has been doing well.  Apparently her   5 years ago, and she reminded me that I am seeing her in the past when I was still here.  She did not get any Procrit on her last visit    Interval Notes:  The following portions of the patient's history were reviewed and updated as appropriate: allergies, current medications, past family history, past medical history, past social history, past surgical history and problem list.     History of Present Illness   76-year-old white lady with chronic kidney disease has an anemia and has been on ESAs since . She has been tolerating it well and her anemia has been stable with a supplement.    Review of Systems   Constitutional: Negative.    HENT: Negative.    Eyes: Negative.    Respiratory: Negative.    Cardiovascular: Negative.    Gastrointestinal: Negative.    Endocrine: Negative.    Genitourinary: Negative.     Musculoskeletal: Negative.    Allergic/Immunologic: Negative.    Neurological: Negative.    Hematological: Negative.    Psychiatric/Behavioral: Negative.         Physical Exam:  Vital Signs for this encounter:  BSA: There is no height or weight on file to calculate BSA.  There were no vitals taken for this visit.    Physical Exam   Constitutional: She is oriented to person, place, and time. She appears well-developed and well-nourished. No distress.   HENT:   Head: Normocephalic and atraumatic.   Nose: Nose normal.   Mouth/Throat: Oropharynx is clear and moist. No oropharyngeal exudate.   Eyes: Conjunctivae and EOM are normal. Pupils are equal, round, and reactive to light. No scleral icterus.   Neck: Normal range of motion. Neck supple. No JVD present. No thyromegaly present.   Cardiovascular: Normal rate, regular rhythm and normal heart sounds.  Exam reveals no gallop and no friction rub.    Pulmonary/Chest: Effort normal and breath sounds normal. No respiratory distress. She has no wheezes. She has no rales. She exhibits no tenderness.   Abdominal: Soft. Bowel sounds are normal. She exhibits no distension. There is no tenderness. There is no guarding.   Musculoskeletal: Normal range of motion. She exhibits no edema or tenderness.   Lymphadenopathy:     She has no cervical adenopathy.   Neurological: She is alert and oriented to person, place, and time. No cranial nerve deficit.   Skin: Skin is warm and dry.   Few purpuric spots, forearms   Psychiatric: She has a normal mood and affect. Her behavior is normal. Judgment and thought content normal.       Performance Status:  Asymptomatic    Results:  WBC   Date Value Ref Range Status   08/29/2017 9.40 4.80 - 10.80 10*3/mm3 Final     Hemoglobin   Date Value Ref Range Status   08/29/2017 10.9 (L) 12.0 - 16.0 g/dL Final     Hematocrit   Date Value Ref Range Status   08/29/2017 33.1 (L) 37.0 - 47.0 % Final     Platelets   Date Value Ref Range Status   08/29/2017 247  130 - 400 10*3/mm3 Final     Creatinine   Date Value Ref Range Status   07/25/2017 2.60 (H) 0.70 - 1.40 mg/dL Final     AST (SGOT)   Date Value Ref Range Status   07/25/2017 40 5 - 40 U/L Final     Anemia of chronic kidney disease.  Hemoglobin today is 10.8gm%,  continue Procrit.  Patient clinically asymptomatic.  Over the last 3 months for MCV of, therefore I am would check vitamin B12 and folic acid on today's draw and I have asked her to start taking oral folic acid 400 µg daily as well as start receiving vitamin B12 1000 µg subcutaneous every 4 weekly.  If the MCV remains high in another 3-4 months I may consider doing a bone marrow biopsy to diagnose myelodysplasia.  The last colonoscopy was done within the last 5 years and a verbal report given to me by the patient it was negative.

## 2017-09-28 ENCOUNTER — INFUSION (OUTPATIENT)
Dept: ONCOLOGY | Facility: HOSPITAL | Age: 77
End: 2017-09-28

## 2017-09-28 ENCOUNTER — LAB (OUTPATIENT)
Dept: LAB | Facility: HOSPITAL | Age: 77
End: 2017-09-28

## 2017-09-28 ENCOUNTER — OFFICE VISIT (OUTPATIENT)
Dept: ONCOLOGY | Facility: CLINIC | Age: 77
End: 2017-09-28

## 2017-09-28 VITALS
OXYGEN SATURATION: 100 % | SYSTOLIC BLOOD PRESSURE: 138 MMHG | BODY MASS INDEX: 23.92 KG/M2 | HEIGHT: 62 IN | HEART RATE: 71 BPM | TEMPERATURE: 97.7 F | DIASTOLIC BLOOD PRESSURE: 81 MMHG | RESPIRATION RATE: 18 BRPM | WEIGHT: 130 LBS

## 2017-09-28 VITALS
RESPIRATION RATE: 16 BRPM | HEIGHT: 62 IN | DIASTOLIC BLOOD PRESSURE: 72 MMHG | HEART RATE: 56 BPM | SYSTOLIC BLOOD PRESSURE: 128 MMHG | TEMPERATURE: 96.9 F | WEIGHT: 129.9 LBS | OXYGEN SATURATION: 97 % | BODY MASS INDEX: 23.9 KG/M2

## 2017-09-28 DIAGNOSIS — D63.1 ANEMIA OF CHRONIC KIDNEY FAILURE, STAGE 3 (MODERATE) (HCC): ICD-10-CM

## 2017-09-28 DIAGNOSIS — N18.30 ANEMIA OF CHRONIC RENAL FAILURE, STAGE 3 (MODERATE) (HCC): ICD-10-CM

## 2017-09-28 DIAGNOSIS — N18.30 ANEMIA OF CHRONIC KIDNEY FAILURE, STAGE 3 (MODERATE) (HCC): ICD-10-CM

## 2017-09-28 DIAGNOSIS — D63.1 ANEMIA OF CHRONIC RENAL FAILURE, STAGE 3 (MODERATE) (HCC): Primary | ICD-10-CM

## 2017-09-28 DIAGNOSIS — N18.30 ANEMIA OF CHRONIC RENAL FAILURE, STAGE 3 (MODERATE) (HCC): Primary | ICD-10-CM

## 2017-09-28 DIAGNOSIS — D63.1 ANEMIA OF CHRONIC RENAL FAILURE, STAGE 3 (MODERATE) (HCC): ICD-10-CM

## 2017-09-28 LAB
BASOPHILS # BLD AUTO: 0.04 10*3/MM3 (ref 0–0.2)
BASOPHILS NFR BLD AUTO: 0.6 % (ref 0–2)
DEPRECATED RDW RBC AUTO: 52.7 FL (ref 40–54)
EOSINOPHIL # BLD AUTO: 0.05 10*3/MM3 (ref 0–0.7)
EOSINOPHIL NFR BLD AUTO: 0.8 % (ref 0–4)
ERYTHROCYTE [DISTWIDTH] IN BLOOD BY AUTOMATED COUNT: 14.8 % (ref 12–15)
HCT VFR BLD AUTO: 32.8 % (ref 37–47)
HGB BLD-MCNC: 10.3 G/DL (ref 12–16)
HOLD SPECIMEN: NORMAL
IMM GRANULOCYTES # BLD: 0.03 10*3/MM3 (ref 0–0.03)
IMM GRANULOCYTES NFR BLD: 0.5 % (ref 0–5)
LYMPHOCYTES # BLD AUTO: 2.07 10*3/MM3 (ref 0.72–4.86)
LYMPHOCYTES NFR BLD AUTO: 32.6 % (ref 15–45)
MCH RBC QN AUTO: 30.4 PG (ref 28–32)
MCHC RBC AUTO-ENTMCNC: 31.4 G/DL (ref 33–36)
MCV RBC AUTO: 96.8 FL (ref 82–98)
MONOCYTES # BLD AUTO: 0.71 10*3/MM3 (ref 0.19–1.3)
MONOCYTES NFR BLD AUTO: 11.2 % (ref 4–12)
NEUTROPHILS # BLD AUTO: 3.44 10*3/MM3 (ref 1.87–8.4)
NEUTROPHILS NFR BLD AUTO: 54.3 % (ref 39–78)
PLATELET # BLD AUTO: 269 10*3/MM3 (ref 130–400)
PMV BLD AUTO: 10.6 FL (ref 6–12)
RBC # BLD AUTO: 3.39 10*6/MM3 (ref 4.2–5.4)
WBC NRBC COR # BLD: 6.34 10*3/MM3 (ref 4.8–10.8)

## 2017-09-28 PROCEDURE — 85025 COMPLETE CBC W/AUTO DIFF WBC: CPT | Performed by: INTERNAL MEDICINE

## 2017-09-28 PROCEDURE — 96372 THER/PROPH/DIAG INJ SC/IM: CPT

## 2017-09-28 PROCEDURE — 25010000002 CYANOCOBALAMIN PER 1000 MCG: Performed by: INTERNAL MEDICINE

## 2017-09-28 PROCEDURE — 36415 COLL VENOUS BLD VENIPUNCTURE: CPT

## 2017-09-28 PROCEDURE — 63510000001 EPOETIN ALFA PER 1000 UNITS: Performed by: INTERNAL MEDICINE

## 2017-09-28 PROCEDURE — 99214 OFFICE O/P EST MOD 30 MIN: CPT | Performed by: INTERNAL MEDICINE

## 2017-09-28 RX ORDER — CLONIDINE HYDROCHLORIDE 0.1 MG/1
0.1 TABLET ORAL DAILY PRN
COMMUNITY
End: 2018-10-02

## 2017-09-28 RX ORDER — SODIUM BICARBONATE 325 MG/1
TABLET ORAL 2 TIMES DAILY
COMMUNITY

## 2017-09-28 RX ORDER — CYANOCOBALAMIN 1000 UG/ML
1000 INJECTION, SOLUTION INTRAMUSCULAR; SUBCUTANEOUS ONCE
Status: CANCELLED
Start: 2017-09-28 | End: 2017-09-28

## 2017-09-28 RX ORDER — CYANOCOBALAMIN 1000 UG/ML
1000 INJECTION, SOLUTION INTRAMUSCULAR; SUBCUTANEOUS ONCE
Status: COMPLETED | OUTPATIENT
Start: 2017-09-28 | End: 2017-09-28

## 2017-09-28 RX ADMIN — CYANOCOBALAMIN 1000 MCG: 1000 INJECTION, SOLUTION INTRAMUSCULAR at 15:09

## 2017-09-28 RX ADMIN — ERYTHROPOIETIN 40000 UNITS: 40000 INJECTION, SOLUTION INTRAVENOUS; SUBCUTANEOUS at 15:12

## 2017-09-28 NOTE — PROGRESS NOTES
1505 Patient receiving procrit for chonic renal disease Hgb has to be less than 11.0 per parameters.  Noemy LAGUNA

## 2017-09-28 NOTE — PROGRESS NOTES
Patient ID:   Susanne Valentine is a 77 y.o. female.  Referring Physician:   García Wood MD  [address] [place] [address]  Primary Care Provider:  Saman Castorena DO    Assessment/Plan:  Assessment   Patient Active Problem List   Diagnosis   • Anemia of chronic kidney failure   • Anemia of chronic renal failure, stage 3 (moderate)   • B12 deficiency     Cancer Staging Information:  No matching staging information was found for the patient.    Diagnoses and all orders for this visit:    Anemia of chronic renal failure, stage 3 (moderate)  -     Clinic Appointment Request    Anemia of chronic kidney failure, stage 3 (moderate)  -     Clinic Appointment Request    Other orders  -     epoetin cleve (EPOGEN,PROCRIT) injection 40,000 Units; Inject 4 mL under the skin 1 (One) Time.  -     cyanocobalamin injection 1,000 mcg; Inject 1 mL under the skin 1 (One) Time.    The patient has a stable anemia from her chronic kidney disease, with the use of Procrit once a month as necessary.  We will hold the treatment today, and have her come back in 1 month with a repeat CBC, we will proceed with Procrit at 40,000 units subcutaneously if her hemoglobin is less than 10 g/DL.  Patient ID:   Susanne Valentine is a 77 y.o. female.  Referring Physician:   García Wood MD  [address] [id number]  Primary Care Provider:  Saman Castorena DO    Assessment/Plan:  Assessment   Patient Active Problem List   Diagnosis   • Anemia of chronic kidney failure   • Anemia of chronic renal failure, stage 3 (moderate)   • B12 deficiency     Cancer Staging Information:  No matching staging information was found for the patient.    Diagnoses and all orders for this visit:    Anemia of chronic renal failure, stage 3 (moderate)  -     Clinic Appointment Request    Anemia of chronic kidney failure, stage 3 (moderate)  -     Clinic Appointment Request    Other orders  -     epoetin cleve (EPOGEN,PROCRIT) injection 40,000 Units; Inject 4 mL  "under the skin 1 (One) Time.  -     cyanocobalamin injection 1,000 mcg; Inject 1 mL under the skin 1 (One) Time.    The patient has a stable anemia from her chronic kidney disease, with the use of Procrit once a month as necessary.  We will hold the treatment today, and have her come back in 1 month with a repeat CBC, we will proceed with Procrit at 40,000 units subcutaneously if her hemoglobin is less than 10 g/DL.     Interval History:  The patient is here for follow-up and for possible consideration for Procrit.  She has no new complaints at this time and has been doing well.  Apparently her   5 years ago, and she reminded me that I am seeing her in the past when I was still here.  She did not get any Procrit on her last visit    Interval Notes:  The following portions of the patient's history were reviewed and updated as appropriate: allergies, current medications, past family history, past medical history, past social history, past surgical history and problem list.     History of Present Illness   76-year-old white lady with chronic kidney disease has an anemia and has been on ESAs since . She has been tolerating it well and her anemia has been stable with a supplement.    Review of Systems   Constitutional: Negative.    HENT: Negative.    Eyes: Negative.    Respiratory: Negative.    Cardiovascular: Negative.    Gastrointestinal: Negative.    Endocrine: Negative.    Genitourinary: Negative.    Musculoskeletal: Negative.    Allergic/Immunologic: Negative.    Neurological: Negative.    Hematological: Negative.    Psychiatric/Behavioral: Negative.         Physical Exam:  Vital Signs for this encounter:  BSA: 1.59 meters squared  /72  Pulse 56  Temp 96.9 °F (36.1 °C) (Tympanic)   Resp 16  Ht 62\" (157.5 cm)  Wt 129 lb 14.4 oz (58.9 kg)  LMP  (LMP Unknown)  SpO2 97%  BMI 23.76 kg/m2    Physical Exam   Constitutional: She is oriented to person, place, and time. She appears well-developed " and well-nourished. No distress.   HENT:   Head: Normocephalic and atraumatic.   Nose: Nose normal.   Mouth/Throat: Oropharynx is clear and moist. No oropharyngeal exudate.   Eyes: Conjunctivae and EOM are normal. Pupils are equal, round, and reactive to light. No scleral icterus.   Neck: Normal range of motion. Neck supple. No JVD present. No thyromegaly present.   Cardiovascular: Normal rate, regular rhythm and normal heart sounds.  Exam reveals no gallop and no friction rub.    Pulmonary/Chest: Effort normal and breath sounds normal. No respiratory distress. She has no wheezes. She has no rales. She exhibits no tenderness.   Abdominal: Soft. Bowel sounds are normal. She exhibits no distension. There is no tenderness. There is no guarding.   Musculoskeletal: Normal range of motion. She exhibits no edema or tenderness.   Lymphadenopathy:     She has no cervical adenopathy.   Neurological: She is alert and oriented to person, place, and time. No cranial nerve deficit.   Skin: Skin is warm and dry.   Few purpuric spots, forearms   Psychiatric: She has a normal mood and affect. Her behavior is normal. Judgment and thought content normal.       Performance Status:  Asymptomatic    Results:  WBC   Date Value Ref Range Status   2017 6.34 4.80 - 10.80 10*3/mm3 Final     Hemoglobin   Date Value Ref Range Status   2017 10.3 (L) 12.0 - 16.0 g/dL Final     Hematocrit   Date Value Ref Range Status   2017 32.8 (L) 37.0 - 47.0 % Final     Platelets   Date Value Ref Range Status   2017 269 130 - 400 10*3/mm3 Final     Creatinine   Date Value Ref Range Status   2017 2.56 (H) 0.50 - 1.40 mg/dL Final     AST (SGOT)   Date Value Ref Range Status   2017 18 7 - 45 U/L Final        Interval History:  The patient is here for follow-up and for possible consideration for Procrit.  She has no new complaints at this time and has been doing well.  Apparently her   5 years ago, and she reminded me  "that I am seeing her in the past when I was still here.  She did not get any Procrit on her last visit    Interval Notes:  The following portions of the patient's history were reviewed and updated as appropriate: allergies, current medications, past family history, past medical history, past social history, past surgical history and problem list.     History of Present Illness   76-year-old white lady with chronic kidney disease has an anemia and has been on ESAs since 2008. She has been tolerating it well and her anemia has been stable with a supplement.    Review of Systems   Constitutional: Negative.    HENT: Negative.    Eyes: Negative.    Respiratory: Negative.    Cardiovascular: Negative.    Gastrointestinal: Negative.    Endocrine: Negative.    Genitourinary: Negative.    Musculoskeletal: Negative.    Allergic/Immunologic: Negative.    Neurological: Negative.    Hematological: Negative.    Psychiatric/Behavioral: Negative.         Physical Exam:  Vital Signs for this encounter:  BSA: 1.59 meters squared  /72  Pulse 56  Temp 96.9 °F (36.1 °C) (Tympanic)   Resp 16  Ht 62\" (157.5 cm)  Wt 129 lb 14.4 oz (58.9 kg)  LMP  (LMP Unknown)  SpO2 97%  BMI 23.76 kg/m2    Physical Exam   Constitutional: She is oriented to person, place, and time. She appears well-developed and well-nourished. No distress.   HENT:   Head: Normocephalic and atraumatic.   Nose: Nose normal.   Mouth/Throat: Oropharynx is clear and moist. No oropharyngeal exudate.   Eyes: Conjunctivae and EOM are normal. Pupils are equal, round, and reactive to light. No scleral icterus.   Neck: Normal range of motion. Neck supple. No JVD present. No thyromegaly present.   Cardiovascular: Normal rate, regular rhythm and normal heart sounds.  Exam reveals no gallop and no friction rub.    Pulmonary/Chest: Effort normal and breath sounds normal. No respiratory distress. She has no wheezes. She has no rales. She exhibits no tenderness.   Abdominal: " Soft. Bowel sounds are normal. She exhibits no distension. There is no tenderness. There is no guarding.   Musculoskeletal: Normal range of motion. She exhibits no edema or tenderness.   Lymphadenopathy:     She has no cervical adenopathy.   Neurological: She is alert and oriented to person, place, and time. No cranial nerve deficit.   Skin: Skin is warm and dry.   Few purpuric spots, forearms   Psychiatric: She has a normal mood and affect. Her behavior is normal. Judgment and thought content normal.       Performance Status:  Asymptomatic    Results:  WBC   Date Value Ref Range Status   09/28/2017 6.34 4.80 - 10.80 10*3/mm3 Final     Hemoglobin   Date Value Ref Range Status   09/28/2017 10.3 (L) 12.0 - 16.0 g/dL Final     Hematocrit   Date Value Ref Range Status   09/28/2017 32.8 (L) 37.0 - 47.0 % Final     Platelets   Date Value Ref Range Status   09/28/2017 269 130 - 400 10*3/mm3 Final     Creatinine   Date Value Ref Range Status   08/29/2017 2.56 (H) 0.50 - 1.40 mg/dL Final     AST (SGOT)   Date Value Ref Range Status   08/29/2017 18 7 - 45 U/L Final     Anemia of chronic kidney disease.  Hemoglobin today is 10.3gm%,  continue Procrit.  Patient clinically asymptomatic.  Over the last 3 months for MCV of, therefore I am would check vitamin B12 and folic acid on today's draw and I have asked her to start taking oral folic acid 400 µg daily as well as start receiving vitamin B12 1000 µg subcutaneous every 4 weekly.  If the MCV remains high in another 3-4 months I may consider doing a bone marrow biopsy to diagnose myelodysplasia.  The last colonoscopy was done within the last 5 years and a verbal report given to me by the patient it was negative.

## 2017-10-05 DIAGNOSIS — E03.9 UNSPECIFIED HYPOTHYROIDISM: ICD-10-CM

## 2017-10-05 RX ORDER — LEVOTHYROXINE SODIUM 0.05 MG/1
TABLET ORAL
Qty: 30 TABLET | Refills: 4 | Status: SHIPPED | OUTPATIENT
Start: 2017-10-05 | End: 2018-03-05 | Stop reason: SDUPTHER

## 2017-10-12 RX ORDER — AMLODIPINE BESYLATE 5 MG/1
5 TABLET ORAL NIGHTLY
Qty: 30 TABLET | Refills: 11 | Status: ON HOLD | OUTPATIENT
Start: 2017-10-12 | End: 2018-09-16 | Stop reason: HOSPADM

## 2017-10-12 RX ORDER — AMLODIPINE BESYLATE 5 MG/1
5 TABLET ORAL NIGHTLY
Qty: 30 TABLET | Refills: 11 | Status: SHIPPED | OUTPATIENT
Start: 2017-10-12 | End: 2017-10-12 | Stop reason: SDUPTHER

## 2017-10-26 ENCOUNTER — INFUSION (OUTPATIENT)
Dept: ONCOLOGY | Facility: HOSPITAL | Age: 77
End: 2017-10-26
Attending: INTERNAL MEDICINE

## 2017-10-26 ENCOUNTER — OFFICE VISIT (OUTPATIENT)
Dept: ONCOLOGY | Facility: CLINIC | Age: 77
End: 2017-10-26

## 2017-10-26 ENCOUNTER — LAB (OUTPATIENT)
Dept: LAB | Facility: HOSPITAL | Age: 77
End: 2017-10-26
Attending: INTERNAL MEDICINE

## 2017-10-26 VITALS
TEMPERATURE: 97.5 F | OXYGEN SATURATION: 100 % | BODY MASS INDEX: 23.74 KG/M2 | WEIGHT: 129 LBS | RESPIRATION RATE: 20 BRPM | HEIGHT: 62 IN | SYSTOLIC BLOOD PRESSURE: 127 MMHG | DIASTOLIC BLOOD PRESSURE: 66 MMHG | HEART RATE: 67 BPM

## 2017-10-26 VITALS
HEIGHT: 62 IN | SYSTOLIC BLOOD PRESSURE: 138 MMHG | HEART RATE: 67 BPM | RESPIRATION RATE: 17 BRPM | BODY MASS INDEX: 23.83 KG/M2 | OXYGEN SATURATION: 97 % | WEIGHT: 129.5 LBS | TEMPERATURE: 97.4 F | DIASTOLIC BLOOD PRESSURE: 80 MMHG

## 2017-10-26 DIAGNOSIS — N18.30 ANEMIA OF CHRONIC KIDNEY FAILURE, STAGE 3 (MODERATE) (HCC): ICD-10-CM

## 2017-10-26 DIAGNOSIS — D63.1 ANEMIA OF CHRONIC KIDNEY FAILURE, STAGE 3 (MODERATE) (HCC): ICD-10-CM

## 2017-10-26 DIAGNOSIS — D63.1 ANEMIA OF CHRONIC RENAL FAILURE, STAGE 3 (MODERATE) (HCC): ICD-10-CM

## 2017-10-26 DIAGNOSIS — N18.30 ANEMIA OF CHRONIC RENAL FAILURE, STAGE 3 (MODERATE) (HCC): Primary | ICD-10-CM

## 2017-10-26 DIAGNOSIS — D63.1 ANEMIA OF CHRONIC RENAL FAILURE, STAGE 3 (MODERATE) (HCC): Primary | ICD-10-CM

## 2017-10-26 DIAGNOSIS — N18.30 ANEMIA OF CHRONIC RENAL FAILURE, STAGE 3 (MODERATE) (HCC): ICD-10-CM

## 2017-10-26 DIAGNOSIS — I13.10 BENIGN HYPERTENSIVE HEART AND RENAL DISEASE: Primary | ICD-10-CM

## 2017-10-26 LAB
AUTO MIXED CELLS #: 0.6 10*3/MM3 (ref 0.1–2.6)
AUTO MIXED CELLS %: 9.5 % (ref 0.1–24)
ERYTHROCYTE [DISTWIDTH] IN BLOOD BY AUTOMATED COUNT: 13.9 % (ref 12–15)
FERRITIN SERPL-MCNC: 48.8 NG/ML (ref 11.1–264)
HCT VFR BLD AUTO: 32.3 % (ref 37–47)
HGB BLD-MCNC: 10.7 G/DL (ref 12–16)
HOLD SPECIMEN: NORMAL
IRON 24H UR-MRATE: 112 MCG/DL (ref 42–180)
IRON SATN MFR SERPL: 38 % (ref 20–45)
LYMPHOCYTES # BLD AUTO: 2 10*3/MM3 (ref 0.8–7)
LYMPHOCYTES NFR BLD AUTO: 31.8 % (ref 15–45)
MCH RBC QN AUTO: 31.1 PG (ref 28–32)
MCHC RBC AUTO-ENTMCNC: 33.1 G/DL (ref 33–36)
MCV RBC AUTO: 93.9 FL (ref 82–98)
NEUTROPHILS # BLD AUTO: 3.6 10*3/MM3 (ref 1.5–8.3)
NEUTROPHILS NFR BLD AUTO: 58.7 % (ref 39–78)
PLATELET # BLD AUTO: 239 10*3/MM3 (ref 130–400)
PMV BLD AUTO: 9.3 FL (ref 6–12)
RBC # BLD AUTO: 3.44 10*6/MM3 (ref 4.2–5.4)
TIBC SERPL-MCNC: 293 MCG/DL (ref 225–420)
WBC NRBC COR # BLD: 6.2 10*3/MM3 (ref 4.8–10.8)

## 2017-10-26 PROCEDURE — 83540 ASSAY OF IRON: CPT | Performed by: INTERNAL MEDICINE

## 2017-10-26 PROCEDURE — 82728 ASSAY OF FERRITIN: CPT | Performed by: INTERNAL MEDICINE

## 2017-10-26 PROCEDURE — 85025 COMPLETE CBC W/AUTO DIFF WBC: CPT

## 2017-10-26 PROCEDURE — 99214 OFFICE O/P EST MOD 30 MIN: CPT | Performed by: INTERNAL MEDICINE

## 2017-10-26 PROCEDURE — 36415 COLL VENOUS BLD VENIPUNCTURE: CPT

## 2017-10-26 PROCEDURE — 83550 IRON BINDING TEST: CPT | Performed by: INTERNAL MEDICINE

## 2017-10-26 RX ORDER — CYANOCOBALAMIN 1000 UG/ML
1000 INJECTION, SOLUTION INTRAMUSCULAR; SUBCUTANEOUS ONCE
Status: CANCELLED
Start: 2017-10-26 | End: 2017-10-26

## 2017-10-26 RX ORDER — CYANOCOBALAMIN 1000 UG/ML
1000 INJECTION, SOLUTION INTRAMUSCULAR; SUBCUTANEOUS ONCE
Status: COMPLETED | OUTPATIENT
Start: 2017-10-26 | End: 2017-10-26

## 2017-10-26 RX ADMIN — ERYTHROPOIETIN 40000 UNITS: 40000 INJECTION, SOLUTION INTRAVENOUS; SUBCUTANEOUS at 14:44

## 2017-10-26 RX ADMIN — CYANOCOBALAMIN 1000 MCG: 1000 INJECTION, SOLUTION INTRAMUSCULAR at 14:42

## 2017-11-02 ENCOUNTER — OFFICE VISIT (OUTPATIENT)
Dept: PRIMARY CARE CLINIC | Age: 77
End: 2017-11-02
Payer: MEDICARE

## 2017-11-02 VITALS
HEART RATE: 50 BPM | SYSTOLIC BLOOD PRESSURE: 136 MMHG | HEIGHT: 62 IN | WEIGHT: 130.8 LBS | TEMPERATURE: 97 F | OXYGEN SATURATION: 90 % | BODY MASS INDEX: 24.07 KG/M2 | DIASTOLIC BLOOD PRESSURE: 70 MMHG

## 2017-11-02 DIAGNOSIS — D50.9 IRON DEFICIENCY ANEMIA, UNSPECIFIED IRON DEFICIENCY ANEMIA TYPE: ICD-10-CM

## 2017-11-02 DIAGNOSIS — I10 ESSENTIAL HYPERTENSION: ICD-10-CM

## 2017-11-02 DIAGNOSIS — N18.4 CKD (CHRONIC KIDNEY DISEASE), STAGE IV (HCC): Primary | ICD-10-CM

## 2017-11-02 DIAGNOSIS — E03.9 ACQUIRED HYPOTHYROIDISM: ICD-10-CM

## 2017-11-02 PROCEDURE — G0008 ADMIN INFLUENZA VIRUS VAC: HCPCS | Performed by: INTERNAL MEDICINE

## 2017-11-02 PROCEDURE — 1123F ACP DISCUSS/DSCN MKR DOCD: CPT | Performed by: INTERNAL MEDICINE

## 2017-11-02 PROCEDURE — 90688 IIV4 VACCINE SPLT 0.5 ML IM: CPT | Performed by: INTERNAL MEDICINE

## 2017-11-02 PROCEDURE — G8420 CALC BMI NORM PARAMETERS: HCPCS | Performed by: INTERNAL MEDICINE

## 2017-11-02 PROCEDURE — 4040F PNEUMOC VAC/ADMIN/RCVD: CPT | Performed by: INTERNAL MEDICINE

## 2017-11-02 PROCEDURE — G8484 FLU IMMUNIZE NO ADMIN: HCPCS | Performed by: INTERNAL MEDICINE

## 2017-11-02 PROCEDURE — 1090F PRES/ABSN URINE INCON ASSESS: CPT | Performed by: INTERNAL MEDICINE

## 2017-11-02 PROCEDURE — 1036F TOBACCO NON-USER: CPT | Performed by: INTERNAL MEDICINE

## 2017-11-02 PROCEDURE — G8427 DOCREV CUR MEDS BY ELIG CLIN: HCPCS | Performed by: INTERNAL MEDICINE

## 2017-11-02 PROCEDURE — 99214 OFFICE O/P EST MOD 30 MIN: CPT | Performed by: INTERNAL MEDICINE

## 2017-11-02 PROCEDURE — G8399 PT W/DXA RESULTS DOCUMENT: HCPCS | Performed by: INTERNAL MEDICINE

## 2017-11-02 ASSESSMENT — ENCOUNTER SYMPTOMS
DIARRHEA: 0
SHORTNESS OF BREATH: 0
CONSTIPATION: 0
BACK PAIN: 0
VOMITING: 0
COUGH: 0
NAUSEA: 0

## 2017-11-02 NOTE — PROGRESS NOTES
[facility]  [address]  [address] [place] [address]  Dept: [phone]  Dept Fax: [phone]  Loc: [phone]    Sarahi Hunt is a 65 y.o. female who presents today for her medical conditions/complaints as noted below. Sarahi Hunt is c/o of   Chief Complaint   Patient presents with    3 Month Follow-Up         HPI:     HPI  Ms. Lisette Wan returns for follow-up of hypothyroidism, hypertension, stage IV CKD. Overall she's doing well. She continues to remain active. She is able to mow her own lawn. She is also an avid [employer]. She has follow-up with nephrology later this month. Hemoglobin A1C (%)   Date Value   07/25/2016 5.3             ( goal A1C is < 7)   No results found for: LABMICR  LDL Calculated (mg/dL)   Date Value   07/25/2016 156   04/25/2016 171   01/04/2016 166 (H)       (goal LDL is <100)   AST (U/L)   Date Value   08/10/2017 16     ALT (U/L)   Date Value   08/10/2017 6     BUN (mg/dL)   Date Value   08/10/2017 41 (H)     BP Readings from Last 3 Encounters:   11/02/17 136/70   08/21/17 122/78   08/10/17 138/84          (goal 120/80)    Past Medical History:   Diagnosis Date    Acid reflux     Anemia     Chronic kidney disease     sees Dr. Diogenes Gerber Gout     Hypertension     Hypothyroidism     Mitral valve prolapse       Past Surgical History:   Procedure Laterality Date    BREAST SURGERY      Left breast biopsy    CHOLECYSTECTOMY      COLONOSCOPY  2010    f/u 3-5 years Dr. Deonna Montgomery  1/22/2010    DR. SANCHEZ    HERNIA REPAIR      TUBAL LIGATION      UPPER GASTROINTESTINAL ENDOSCOPY  2/22/2006    DR. Marshall       Family History   Problem Relation Age of Onset    Breast Cancer Mother 80    Hypertension Mother     Heart Disease Mother     Colon Polyps Mother     Hypertension Sister     Breast Cancer Maternal Aunt     Colon Cancer Neg Hx        Social History   Substance Use Topics    shortness of breath. Cardiovascular: Negative for chest pain and leg swelling. Gastrointestinal: Negative for constipation, diarrhea, nausea and vomiting. Genitourinary: Negative for difficulty urinating and dysuria. Musculoskeletal: Negative for arthralgias and back pain. Neurological: Negative for dizziness and headaches. Objective:     Physical Exam   Constitutional: She is oriented to person, place, and time. She appears well-developed and well-nourished. HENT:   Head: Normocephalic and atraumatic. Mouth/Throat: Oropharynx is clear and moist.   Eyes: Conjunctivae are normal. Pupils are equal, round, and reactive to light. Cardiovascular: Normal rate, regular rhythm and normal heart sounds. Pulmonary/Chest: Effort normal and breath sounds normal.   Abdominal: Soft. Musculoskeletal: Normal range of motion. Neurological: She is alert and oriented to person, place, and time. Skin: Skin is warm and dry. Vitals reviewed. /70 (Site: Left Arm, Position: Sitting)   Pulse 50   Temp 97 °F (36.1 °C)   Ht 5' 2\" (1.575 m)   Wt 130 lb 12.8 oz (59.3 kg)   LMP  (LMP Unknown)   SpO2 90%   BMI 23.92 kg/m²     Assessment:      1. CKD (chronic kidney disease), stage IV ([facility] [address]     2. Acquired hypothyroidism     3. Essential hypertension     4. Iron deficiency anemia, unspecified iron deficiency anemia type               Plan:      Return in about 4 months (around 3/2/2018). Patient Instructions   Continue current medications. Follow up again in 4 months. Orders Placed This Encounter   Procedures    INFLUENZA, QUADV, 3 YRS AND OLDER, IM, MDV, 0.5ML ([facility])     No orders of the defined types were placed in this encounter. Patient given educational materials - see patient instructions. Discussed use, benefit, and side effects of prescribed medications. All patient questions answered. Pt voiced understanding. Reviewed health maintenance.   Instructed to continue current medications, diet and exercise. Patient agreed with treatment plan. Follow up as directed.      Electronically signed by Vivienne Crawford DO on 11/2/2017 at 11:30 AM

## 2017-11-17 DIAGNOSIS — D63.1 ANEMIA OF CHRONIC RENAL FAILURE, STAGE 3 (MODERATE) (HCC): ICD-10-CM

## 2017-11-17 DIAGNOSIS — D63.1 ANEMIA OF CHRONIC KIDNEY FAILURE, STAGE 3 (MODERATE) (HCC): ICD-10-CM

## 2017-11-17 DIAGNOSIS — N18.30 ANEMIA OF CHRONIC RENAL FAILURE, STAGE 3 (MODERATE) (HCC): ICD-10-CM

## 2017-11-17 DIAGNOSIS — N18.30 ANEMIA OF CHRONIC KIDNEY FAILURE, STAGE 3 (MODERATE) (HCC): ICD-10-CM

## 2017-11-30 ENCOUNTER — LAB (OUTPATIENT)
Dept: LAB | Facility: HOSPITAL | Age: 77
End: 2017-11-30
Attending: INTERNAL MEDICINE

## 2017-11-30 ENCOUNTER — INFUSION (OUTPATIENT)
Dept: ONCOLOGY | Facility: HOSPITAL | Age: 77
End: 2017-11-30
Attending: INTERNAL MEDICINE

## 2017-11-30 ENCOUNTER — OFFICE VISIT (OUTPATIENT)
Dept: ONCOLOGY | Facility: CLINIC | Age: 77
End: 2017-11-30

## 2017-11-30 VITALS
HEIGHT: 62 IN | BODY MASS INDEX: 24.35 KG/M2 | WEIGHT: 132.3 LBS | TEMPERATURE: 97.8 F | HEART RATE: 52 BPM | OXYGEN SATURATION: 91 % | SYSTOLIC BLOOD PRESSURE: 122 MMHG | RESPIRATION RATE: 16 BRPM | DIASTOLIC BLOOD PRESSURE: 80 MMHG

## 2017-11-30 VITALS
HEART RATE: 64 BPM | DIASTOLIC BLOOD PRESSURE: 69 MMHG | WEIGHT: 132.4 LBS | TEMPERATURE: 98.6 F | OXYGEN SATURATION: 100 % | BODY MASS INDEX: 24.37 KG/M2 | SYSTOLIC BLOOD PRESSURE: 132 MMHG | RESPIRATION RATE: 18 BRPM | HEIGHT: 62 IN

## 2017-11-30 DIAGNOSIS — D63.1 ANEMIA OF CHRONIC RENAL FAILURE, STAGE 3 (MODERATE) (HCC): ICD-10-CM

## 2017-11-30 DIAGNOSIS — D63.1 ANEMIA OF CHRONIC RENAL FAILURE, STAGE 3 (MODERATE) (HCC): Primary | ICD-10-CM

## 2017-11-30 DIAGNOSIS — N18.30 ANEMIA OF CHRONIC KIDNEY FAILURE, STAGE 3 (MODERATE) (HCC): ICD-10-CM

## 2017-11-30 DIAGNOSIS — N18.30 ANEMIA OF CHRONIC RENAL FAILURE, STAGE 3 (MODERATE) (HCC): ICD-10-CM

## 2017-11-30 DIAGNOSIS — D63.1 ANEMIA OF CHRONIC KIDNEY FAILURE, STAGE 3 (MODERATE) (HCC): ICD-10-CM

## 2017-11-30 DIAGNOSIS — N18.30 ANEMIA OF CHRONIC RENAL FAILURE, STAGE 3 (MODERATE) (HCC): Primary | ICD-10-CM

## 2017-11-30 LAB
AUTO MIXED CELLS #: 0.5 10*3/MM3 (ref 0.1–2.6)
AUTO MIXED CELLS %: 8.9 % (ref 0.1–24)
ERYTHROCYTE [DISTWIDTH] IN BLOOD BY AUTOMATED COUNT: 14.2 % (ref 12–15)
HCT VFR BLD AUTO: 29.6 % (ref 37–47)
HGB BLD-MCNC: 9.7 G/DL (ref 12–16)
HOLD SPECIMEN: NORMAL
LYMPHOCYTES # BLD AUTO: 1.6 10*3/MM3 (ref 0.8–7)
LYMPHOCYTES NFR BLD AUTO: 29.4 % (ref 15–45)
MCH RBC QN AUTO: 30 PG (ref 28–32)
MCHC RBC AUTO-ENTMCNC: 32.8 G/DL (ref 33–36)
MCV RBC AUTO: 91.6 FL (ref 82–98)
NEUTROPHILS # BLD AUTO: 3.3 10*3/MM3 (ref 1.5–8.3)
NEUTROPHILS NFR BLD AUTO: 61.7 % (ref 39–78)
PLATELET # BLD AUTO: 205 10*3/MM3 (ref 130–400)
PMV BLD AUTO: 9.6 FL (ref 6–12)
RBC # BLD AUTO: 3.23 10*6/MM3 (ref 4.2–5.4)
WBC NRBC COR # BLD: 5.4 10*3/MM3 (ref 4.8–10.8)

## 2017-11-30 PROCEDURE — 99214 OFFICE O/P EST MOD 30 MIN: CPT | Performed by: INTERNAL MEDICINE

## 2017-11-30 RX ORDER — CYANOCOBALAMIN 1000 UG/ML
1000 INJECTION, SOLUTION INTRAMUSCULAR; SUBCUTANEOUS ONCE
Status: COMPLETED | OUTPATIENT
Start: 2017-11-30 | End: 2017-11-30

## 2017-11-30 RX ORDER — CYANOCOBALAMIN 1000 UG/ML
1000 INJECTION, SOLUTION INTRAMUSCULAR; SUBCUTANEOUS ONCE
Status: CANCELLED
Start: 2017-11-30 | End: 2017-11-30

## 2017-11-30 RX ADMIN — ERYTHROPOIETIN 40000 UNITS: 40000 INJECTION, SOLUTION INTRAVENOUS; SUBCUTANEOUS at 11:35

## 2017-11-30 RX ADMIN — CYANOCOBALAMIN 1000 MCG: 1000 INJECTION, SOLUTION INTRAMUSCULAR at 11:37

## 2017-11-30 NOTE — PROGRESS NOTES
Patient ID:   Susanne Valentine is a 77 y.o. female.  Referring Physician:   No referring provider defined for this encounter.  Primary Care Provider:  Saman Castorena DO    Assessment/Plan:  Assessment   Patient Active Problem List   Diagnosis   • Anemia of chronic kidney failure   • Anemia of chronic renal failure, stage 3 (moderate)   • B12 deficiency     Cancer Staging Information:  No matching staging information was found for the patient.    Diagnoses and all orders for this visit:    Anemia of chronic renal failure, stage 3 (moderate)  -     Clinic Appointment Request    Anemia of chronic kidney failure, stage 3 (moderate)  -     Clinic Appointment Request      The patient has a stable anemia from her chronic kidney disease, with the use of Procrit once a month as necessary.  We will hold the treatment today, and have her come back in 1 month with a repeat CBC, we will proceed with Procrit at 40,000 units subcutaneously if her hemoglobin is less than 10 g/DL.  Patient ID:   Susanne Valentine is a 77 y.o. female.  Referring Physician:   No referring provider defined for this encounter.  Primary Care Provider:  Saman Castorena DO    Assessment/Plan:  Assessment   Patient Active Problem List   Diagnosis   • Anemia of chronic kidney failure   • Anemia of chronic renal failure, stage 3 (moderate)   • B12 deficiency     Cancer Staging Information:  No matching staging information was found for the patient.    Diagnoses and all orders for this visit:    Anemia of chronic renal failure, stage 3 (moderate)  -     Clinic Appointment Request    Anemia of chronic kidney failure, stage 3 (moderate)  -     Clinic Appointment Request      The patient has a stable anemia from her chronic kidney disease, with the use of Procrit once a month as necessary.  We will hold the treatment today, and have her come back in 1 month with a repeat CBC, we will proceed with Procrit at 40,000 units subcutaneously if her hemoglobin is  "less than 10 g/DL.     Interval History:  The patient is here for follow-up and for possible consideration for Procrit.  She has no new complaints at this time and has been doing well.  Apparently her   5 years ago, and she reminded me that I am seeing her in the past when I was still here.  She did not get any Procrit on her last visit    Interval Notes:  The following portions of the patient's history were reviewed and updated as appropriate: allergies, current medications, past family history, past medical history, past social history, past surgical history and problem list.     History of Present Illness   76-year-old white lady with chronic kidney disease has an anemia and has been on ESAs since . She has been tolerating it well and her anemia has been stable with a supplement.    Review of Systems   Constitutional: Negative.    HENT: Negative.    Eyes: Negative.    Respiratory: Negative.    Cardiovascular: Negative.    Gastrointestinal: Negative.    Endocrine: Negative.    Genitourinary: Negative.    Musculoskeletal: Negative.    Allergic/Immunologic: Negative.    Neurological: Negative.    Hematological: Negative.    Psychiatric/Behavioral: Negative.         Physical Exam:  Vital Signs for this encounter:  BSA: 1.6 meters squared  /80  Pulse 52  Temp 97.8 °F (36.6 °C) (Tympanic)   Resp 16  Ht 62\" (157.5 cm)  Wt 132 lb 4.8 oz (60 kg)  LMP  (LMP Unknown)  SpO2 91%  BMI 24.2 kg/m2    Physical Exam   Constitutional: She is oriented to person, place, and time. She appears well-developed and well-nourished. No distress.   HENT:   Head: Normocephalic and atraumatic.   Nose: Nose normal.   Mouth/Throat: Oropharynx is clear and moist. No oropharyngeal exudate.   Eyes: Conjunctivae and EOM are normal. Pupils are equal, round, and reactive to light. No scleral icterus.   Neck: Normal range of motion. Neck supple. No JVD present. No thyromegaly present.   Cardiovascular: Normal rate, regular " rhythm and normal heart sounds.  Exam reveals no gallop and no friction rub.    Pulmonary/Chest: Effort normal and breath sounds normal. No respiratory distress. She has no wheezes. She has no rales. She exhibits no tenderness.   Abdominal: Soft. Bowel sounds are normal. She exhibits no distension. There is no tenderness. There is no guarding.   Musculoskeletal: Normal range of motion. She exhibits no edema or tenderness.   Lymphadenopathy:     She has no cervical adenopathy.   Neurological: She is alert and oriented to person, place, and time. No cranial nerve deficit.   Skin: Skin is warm and dry.   Few purpuric spots, forearms   Psychiatric: She has a normal mood and affect. Her behavior is normal. Judgment and thought content normal.       Performance Status:  Asymptomatic    Results:  WBC   Date Value Ref Range Status   2017 5.40 4.80 - 10.80 10*3/mm3 Final     Hemoglobin   Date Value Ref Range Status   2017 9.7 (L) 12.0 - 16.0 g/dL Final     Hematocrit   Date Value Ref Range Status   2017 29.6 (L) 37.0 - 47.0 % Final     Platelets   Date Value Ref Range Status   2017 205 130 - 400 10*3/mm3 Final     Creatinine   Date Value Ref Range Status   2017 2.56 (H) 0.50 - 1.40 mg/dL Final     AST (SGOT)   Date Value Ref Range Status   2017 18 7 - 45 U/L Final        Interval History:  The patient is here for follow-up and for possible consideration for Procrit.  She has no new complaints at this time and has been doing well.  Apparently her   5 years ago, and she reminded me that I am seeing her in the past when I was still here.  She did not get any Procrit on her last visit    Interval Notes:  The following portions of the patient's history were reviewed and updated as appropriate: allergies, current medications, past family history, past medical history, past social history, past surgical history and problem list.     History of Present Illness   76-year-old white lady  "with chronic kidney disease has an anemia and has been on ESAs since 2008. She has been tolerating it well and her anemia has been stable with a supplement.    Review of Systems   Constitutional: Negative.    HENT: Negative.    Eyes: Negative.    Respiratory: Negative.    Cardiovascular: Negative.    Gastrointestinal: Negative.    Endocrine: Negative.    Genitourinary: Negative.    Musculoskeletal: Negative.    Allergic/Immunologic: Negative.    Neurological: Negative.    Hematological: Negative.    Psychiatric/Behavioral: Negative.         Physical Exam:  Vital Signs for this encounter:  BSA: 1.6 meters squared  /80  Pulse 52  Temp 97.8 °F (36.6 °C) (Tympanic)   Resp 16  Ht 62\" (157.5 cm)  Wt 132 lb 4.8 oz (60 kg)  LMP  (LMP Unknown)  SpO2 91%  BMI 24.2 kg/m2    Physical Exam   Constitutional: She is oriented to person, place, and time. She appears well-developed and well-nourished. No distress.   HENT:   Head: Normocephalic and atraumatic.   Nose: Nose normal.   Mouth/Throat: Oropharynx is clear and moist. No oropharyngeal exudate.   Eyes: Conjunctivae and EOM are normal. Pupils are equal, round, and reactive to light. No scleral icterus.   Neck: Normal range of motion. Neck supple. No JVD present. No thyromegaly present.   Cardiovascular: Normal rate, regular rhythm and normal heart sounds.  Exam reveals no gallop and no friction rub.    Pulmonary/Chest: Effort normal and breath sounds normal. No respiratory distress. She has no wheezes. She has no rales. She exhibits no tenderness.   Abdominal: Soft. Bowel sounds are normal. She exhibits no distension. There is no tenderness. There is no guarding.   Musculoskeletal: Normal range of motion. She exhibits no edema or tenderness.   Lymphadenopathy:     She has no cervical adenopathy.   Neurological: She is alert and oriented to person, place, and time. No cranial nerve deficit.   Skin: Skin is warm and dry.   Few purpuric spots, forearms   Psychiatric: " She has a normal mood and affect. Her behavior is normal. Judgment and thought content normal.       Performance Status:  Asymptomatic    Results:  WBC   Date Value Ref Range Status   11/30/2017 5.40 4.80 - 10.80 10*3/mm3 Final     Hemoglobin   Date Value Ref Range Status   11/30/2017 9.7 (L) 12.0 - 16.0 g/dL Final     Hematocrit   Date Value Ref Range Status   11/30/2017 29.6 (L) 37.0 - 47.0 % Final     Platelets   Date Value Ref Range Status   11/30/2017 205 130 - 400 10*3/mm3 Final     Creatinine   Date Value Ref Range Status   08/29/2017 2.56 (H) 0.50 - 1.40 mg/dL Final     AST (SGOT)   Date Value Ref Range Status   08/29/2017 18 7 - 45 U/L Final     Anemia of chronic kidney disease.  Hemoglobin today is 9.7gm%,  continue Procrit.  Patient clinically asymptomatic.  Over the last 3 months for MCV of, therefore I am would check vitamin B12 and folic acid on today's draw and I have asked her to start taking oral folic acid 400 µg daily as well as start receiving vitamin B12 1000 µg subcutaneous every 4 weekly.  If the MCV remains high in another 3-4 months I may consider doing a bone marrow biopsy to diagnose myelodysplasia.  The last colonoscopy was done within the last 5 years and a verbal report given to me by the patient it was negative.

## 2017-12-05 RX ORDER — CALCITRIOL 0.25 UG/1
0.25 CAPSULE, LIQUID FILLED ORAL EVERY OTHER DAY
Qty: 30 CAPSULE | Refills: 5 | Status: ON HOLD | OUTPATIENT
Start: 2017-12-05 | End: 2018-09-16 | Stop reason: HOSPADM

## 2017-12-28 ENCOUNTER — INFUSION (OUTPATIENT)
Dept: ONCOLOGY | Facility: HOSPITAL | Age: 77
End: 2017-12-28
Attending: INTERNAL MEDICINE

## 2017-12-28 ENCOUNTER — OFFICE VISIT (OUTPATIENT)
Dept: ONCOLOGY | Facility: CLINIC | Age: 77
End: 2017-12-28

## 2017-12-28 ENCOUNTER — LAB (OUTPATIENT)
Dept: LAB | Facility: HOSPITAL | Age: 77
End: 2017-12-28
Attending: INTERNAL MEDICINE

## 2017-12-28 VITALS
SYSTOLIC BLOOD PRESSURE: 129 MMHG | HEART RATE: 65 BPM | TEMPERATURE: 97.5 F | WEIGHT: 132 LBS | OXYGEN SATURATION: 100 % | RESPIRATION RATE: 16 BRPM | DIASTOLIC BLOOD PRESSURE: 63 MMHG | BODY MASS INDEX: 24.29 KG/M2 | HEIGHT: 62 IN

## 2017-12-28 VITALS
SYSTOLIC BLOOD PRESSURE: 120 MMHG | BODY MASS INDEX: 24.42 KG/M2 | HEART RATE: 64 BPM | WEIGHT: 132.7 LBS | TEMPERATURE: 96.6 F | RESPIRATION RATE: 16 BRPM | HEIGHT: 62 IN | DIASTOLIC BLOOD PRESSURE: 82 MMHG

## 2017-12-28 DIAGNOSIS — D63.1 ANEMIA OF CHRONIC KIDNEY FAILURE, STAGE 3 (MODERATE) (HCC): ICD-10-CM

## 2017-12-28 DIAGNOSIS — D63.1 ANEMIA OF CHRONIC RENAL FAILURE, STAGE 3 (MODERATE) (HCC): ICD-10-CM

## 2017-12-28 DIAGNOSIS — N18.30 ANEMIA OF CHRONIC RENAL FAILURE, STAGE 3 (MODERATE) (HCC): ICD-10-CM

## 2017-12-28 DIAGNOSIS — N18.30 ANEMIA OF CHRONIC KIDNEY FAILURE, STAGE 3 (MODERATE) (HCC): ICD-10-CM

## 2017-12-28 DIAGNOSIS — D63.1 ANEMIA OF CHRONIC RENAL FAILURE, STAGE 3 (MODERATE) (HCC): Primary | ICD-10-CM

## 2017-12-28 DIAGNOSIS — N18.30 ANEMIA OF CHRONIC RENAL FAILURE, STAGE 3 (MODERATE) (HCC): Primary | ICD-10-CM

## 2017-12-28 LAB
ALBUMIN SERPL-MCNC: 3.3 G/DL (ref 3.5–5)
ALBUMIN/GLOB SERPL: 1.2 G/DL (ref 1.1–2.5)
ALP SERPL-CCNC: 81 U/L (ref 24–120)
ALT SERPL W P-5'-P-CCNC: 30 U/L (ref 0–54)
ANION GAP SERPL CALCULATED.3IONS-SCNC: 6 MMOL/L (ref 4–13)
AST SERPL-CCNC: 19 U/L (ref 7–45)
AUTO MIXED CELLS #: 0.8 10*3/MM3 (ref 0.1–2.6)
AUTO MIXED CELLS %: 11.4 % (ref 0.1–24)
BILIRUB SERPL-MCNC: 0.2 MG/DL (ref 0.1–1)
BUN BLD-MCNC: 37 MG/DL (ref 5–21)
BUN/CREAT SERPL: 14.9
CALCIUM SPEC-SCNC: 8.9 MG/DL (ref 8.4–10.4)
CHLORIDE SERPL-SCNC: 106 MMOL/L (ref 98–110)
CO2 SERPL-SCNC: 18 MMOL/L (ref 24–31)
CREAT BLD-MCNC: 2.49 MG/DL (ref 0.5–1.4)
ERYTHROCYTE [DISTWIDTH] IN BLOOD BY AUTOMATED COUNT: 14.8 % (ref 12–15)
GFR SERPL CREATININE-BSD FRML MDRD: 19 ML/MIN/1.73
GLOBULIN UR ELPH-MCNC: 2.8 GM/DL
GLUCOSE BLD-MCNC: 111 MG/DL (ref 70–100)
HCT VFR BLD AUTO: 32 % (ref 37–47)
HGB BLD-MCNC: 10.5 G/DL (ref 12–16)
HOLD SPECIMEN: NORMAL
LYMPHOCYTES # BLD AUTO: 1.7 10*3/MM3 (ref 0.8–7)
LYMPHOCYTES NFR BLD AUTO: 23.3 % (ref 15–45)
MCH RBC QN AUTO: 30.6 PG (ref 28–32)
MCHC RBC AUTO-ENTMCNC: 32.8 G/DL (ref 33–36)
MCV RBC AUTO: 93.3 FL (ref 82–98)
NEUTROPHILS # BLD AUTO: 4.6 10*3/MM3 (ref 1.5–8.3)
NEUTROPHILS NFR BLD AUTO: 65.3 % (ref 39–78)
PLATELET # BLD AUTO: 260 10*3/MM3 (ref 130–400)
PMV BLD AUTO: 9.3 FL (ref 6–12)
POTASSIUM BLD-SCNC: 3.8 MMOL/L (ref 3.5–5.3)
PROT SERPL-MCNC: 6.1 G/DL (ref 6.3–8.7)
RBC # BLD AUTO: 3.43 10*6/MM3 (ref 4.2–5.4)
SODIUM BLD-SCNC: 130 MMOL/L (ref 135–145)
WBC NRBC COR # BLD: 7.1 10*3/MM3 (ref 4.8–10.8)

## 2017-12-28 PROCEDURE — 80053 COMPREHEN METABOLIC PANEL: CPT

## 2017-12-28 PROCEDURE — 36415 COLL VENOUS BLD VENIPUNCTURE: CPT

## 2017-12-28 PROCEDURE — 63510000001 EPOETIN ALFA PER 1000 UNITS: Performed by: INTERNAL MEDICINE

## 2017-12-28 PROCEDURE — 85025 COMPLETE CBC W/AUTO DIFF WBC: CPT

## 2017-12-28 PROCEDURE — 96372 THER/PROPH/DIAG INJ SC/IM: CPT

## 2017-12-28 PROCEDURE — 99214 OFFICE O/P EST MOD 30 MIN: CPT | Performed by: INTERNAL MEDICINE

## 2017-12-28 RX ORDER — CYANOCOBALAMIN 1000 UG/ML
1000 INJECTION, SOLUTION INTRAMUSCULAR; SUBCUTANEOUS ONCE
Status: CANCELLED
Start: 2017-12-28 | End: 2017-12-28

## 2017-12-28 RX ORDER — CYANOCOBALAMIN 1000 UG/ML
1000 INJECTION, SOLUTION INTRAMUSCULAR; SUBCUTANEOUS ONCE
Status: DISCONTINUED | OUTPATIENT
Start: 2017-12-28 | End: 2017-12-28 | Stop reason: HOSPADM

## 2017-12-28 RX ADMIN — ERYTHROPOIETIN 40000 UNITS: 40000 INJECTION, SOLUTION INTRAVENOUS; SUBCUTANEOUS at 12:13

## 2017-12-28 NOTE — PROGRESS NOTES
Patient ID:   Susanne Valentine is a 77 y.o. female.  Referring Physician:   García Wood MD  [id number] [facility] [address]  [place] [address]  Primary Care Provider:  Saman Castorena DO    Assessment/Plan:  Assessment   Patient Active Problem List   Diagnosis   • Anemia of chronic kidney failure   • Anemia of chronic renal failure, stage 3 (moderate)   • B12 deficiency     Cancer Staging Information:  No matching staging information was found for the patient.    Diagnoses and all orders for this visit:    Anemia of chronic renal failure, stage 3 (moderate)  -     Clinic Appointment Request    Anemia of chronic kidney failure, stage 3 (moderate)  -     Clinic Appointment Request    Other orders  -     epoetin cleve (EPOGEN,PROCRIT) injection 40,000 Units; Inject 4 mL under the skin 1 (One) Time.  -     cyanocobalamin injection 1,000 mcg; Inject 1 mL under the skin 1 (One) Time.      The patient has a stable anemia from her chronic kidney disease, with the use of Procrit once a month as necessary.  We will hold the treatment today, and have her come back in 1 month with a repeat CBC, we will proceed with Procrit at 40,000 units subcutaneously if her hemoglobin is less than 10 g/DL.  Patient ID:   Susanne Valentine is a 77 y.o. female.  Referring Physician:   García Wood MD  [facility] [facility] [address]  [place] [address]  Primary Care Provider:  Saman Castorena DO    Assessment/Plan:  Assessment   Patient Active Problem List   Diagnosis   • Anemia of chronic kidney failure   • Anemia of chronic renal failure, stage 3 (moderate)   • B12 deficiency     Cancer Staging Information:  No matching staging information was found for the patient.    Diagnoses and all orders for this visit:    Anemia of chronic renal failure, stage 3 (moderate)  -     Clinic Appointment Request    Anemia of chronic kidney failure, stage 3 (moderate)  -     Clinic Appointment Request    Other orders  -      "epoetin cleve (EPOGEN,PROCRIT) injection 40,000 Units; Inject 4 mL under the skin 1 (One) Time.  -     cyanocobalamin injection 1,000 mcg; Inject 1 mL under the skin 1 (One) Time.      The patient has a stable anemia from her chronic kidney disease, with the use of Procrit once a month as necessary.  We will hold the treatment today, and have her come back in 1 month with a repeat CBC, we will proceed with Procrit at 40,000 units subcutaneously if her hemoglobin is less than 10 g/DL.     Interval History:  The patient is here for follow-up and for possible consideration for Procrit.  She has no new complaints at this time and has been doing well.  Apparently her   5 years ago, and she reminded me that I am seeing her in the past when I was still here.  She did not get any Procrit on her last visit    Interval Notes:  The following portions of the patient's history were reviewed and updated as appropriate: allergies, current medications, past family history, past medical history, past social history, past surgical history and problem list.     History of Present Illness   76-year-old white lady with chronic kidney disease has an anemia and has been on ESAs since . She has been tolerating it well and her anemia has been stable with a supplement.    Review of Systems   Constitutional: Negative.    HENT: Negative.    Eyes: Negative.    Respiratory: Negative.    Cardiovascular: Negative.    Gastrointestinal: Negative.    Endocrine: Negative.    Genitourinary: Negative.    Musculoskeletal: Negative.    Allergic/Immunologic: Negative.    Neurological: Negative.    Hematological: Negative.    Psychiatric/Behavioral: Negative.         Physical Exam:  Vital Signs for this encounter:  BSA: 1.61 meters squared  /82  Pulse 64  Temp 96.6 °F (35.9 °C) (Tympanic)   Resp 16  Ht 157.5 cm (62.01\")  Wt 60.2 kg (132 lb 11.2 oz)  LMP  (LMP Unknown)  BMI 24.26 kg/m2    Physical Exam   Constitutional: She is " oriented to person, place, and time. She appears well-developed and well-nourished. No distress.   HENT:   Head: Normocephalic and atraumatic.   Nose: Nose normal.   Mouth/Throat: Oropharynx is clear and moist. No oropharyngeal exudate.   Eyes: Conjunctivae and EOM are normal. Pupils are equal, round, and reactive to light. No scleral icterus.   Neck: Normal range of motion. Neck supple. No JVD present. No thyromegaly present.   Cardiovascular: Normal rate, regular rhythm and normal heart sounds.  Exam reveals no gallop and no friction rub.    Pulmonary/Chest: Effort normal and breath sounds normal. No respiratory distress. She has no wheezes. She has no rales. She exhibits no tenderness.   Abdominal: Soft. Bowel sounds are normal. She exhibits no distension. There is no tenderness. There is no guarding.   Musculoskeletal: Normal range of motion. She exhibits no edema or tenderness.   Lymphadenopathy:     She has no cervical adenopathy.   Neurological: She is alert and oriented to person, place, and time. No cranial nerve deficit.   Skin: Skin is warm and dry.   Few purpuric spots, forearms   Psychiatric: She has a normal mood and affect. Her behavior is normal. Judgment and thought content normal.       Performance Status:  Asymptomatic    Results:  WBC   Date Value Ref Range Status   12/28/2017 7.10 4.80 - 10.80 10*3/mm3 Final     Hemoglobin   Date Value Ref Range Status   12/28/2017 10.5 (L) 12.0 - 16.0 g/dL Final     Hematocrit   Date Value Ref Range Status   12/28/2017 32.0 (L) 37.0 - 47.0 % Final     Platelets   Date Value Ref Range Status   12/28/2017 260 130 - 400 10*3/mm3 Final     Creatinine   Date Value Ref Range Status   12/28/2017 2.49 (H) 0.50 - 1.40 mg/dL Final     AST (SGOT)   Date Value Ref Range Status   12/28/2017 19 7 - 45 U/L Final        Interval History:  The patient is here for follow-up and for possible consideration for Procrit.  She has no new complaints at this time and has been doing  "well.  Apparently her   5 years ago, and she reminded me that I am seeing her in the past when I was still here.  She did not get any Procrit on her last visit    Interval Notes:  The following portions of the patient's history were reviewed and updated as appropriate: allergies, current medications, past family history, past medical history, past social history, past surgical history and problem list.     History of Present Illness   76-year-old white lady with chronic kidney disease has an anemia and has been on ESAs since . She has been tolerating it well and her anemia has been stable with a supplement.    Review of Systems   Constitutional: Negative.    HENT: Negative.    Eyes: Negative.    Respiratory: Negative.    Cardiovascular: Negative.    Gastrointestinal: Negative.    Endocrine: Negative.    Genitourinary: Negative.    Musculoskeletal: Negative.    Allergic/Immunologic: Negative.    Neurological: Negative.    Hematological: Negative.    Psychiatric/Behavioral: Negative.         Physical Exam:  Vital Signs for this encounter:  BSA: 1.61 meters squared  /82  Pulse 64  Temp 96.6 °F (35.9 °C) (Tympanic)   Resp 16  Ht 157.5 cm (62.01\")  Wt 60.2 kg (132 lb 11.2 oz)  LMP  (LMP Unknown)  BMI 24.26 kg/m2    Physical Exam   Constitutional: She is oriented to person, place, and time. She appears well-developed and well-nourished. No distress.   HENT:   Head: Normocephalic and atraumatic.   Nose: Nose normal.   Mouth/Throat: Oropharynx is clear and moist. No oropharyngeal exudate.   Eyes: Conjunctivae and EOM are normal. Pupils are equal, round, and reactive to light. No scleral icterus.   Neck: Normal range of motion. Neck supple. No JVD present. No thyromegaly present.   Cardiovascular: Normal rate, regular rhythm and normal heart sounds.  Exam reveals no gallop and no friction rub.    Pulmonary/Chest: Effort normal and breath sounds normal. No respiratory distress. She has no wheezes. " She has no rales. She exhibits no tenderness.   Abdominal: Soft. Bowel sounds are normal. She exhibits no distension. There is no tenderness. There is no guarding.   Musculoskeletal: Normal range of motion. She exhibits no edema or tenderness.   Lymphadenopathy:     She has no cervical adenopathy.   Neurological: She is alert and oriented to person, place, and time. No cranial nerve deficit.   Skin: Skin is warm and dry.   Few purpuric spots, forearms   Psychiatric: She has a normal mood and affect. Her behavior is normal. Judgment and thought content normal.       Performance Status:  Asymptomatic    Results:  WBC   Date Value Ref Range Status   12/28/2017 7.10 4.80 - 10.80 10*3/mm3 Final     Hemoglobin   Date Value Ref Range Status   12/28/2017 10.5 (L) 12.0 - 16.0 g/dL Final     Hematocrit   Date Value Ref Range Status   12/28/2017 32.0 (L) 37.0 - 47.0 % Final     Platelets   Date Value Ref Range Status   12/28/2017 260 130 - 400 10*3/mm3 Final     Creatinine   Date Value Ref Range Status   12/28/2017 2.49 (H) 0.50 - 1.40 mg/dL Final     AST (SGOT)   Date Value Ref Range Status   12/28/2017 19 7 - 45 U/L Final     Anemia of chronic kidney disease.  Hemoglobin today is 105gm%,  continue Procrit.  Patient clinically asymptomatic.  Over the last 3 months for MCV of, therefore I am would check vitamin B12 and folic acid on today's draw and I have asked her to start taking oral folic acid 400 µg daily as well as start receiving vitamin B12 1000 µg subcutaneous every 4 weekly.  If the MCV remains high in another 3-4 months I may consider doing a bone marrow biopsy to diagnose myelodysplasia.  The last colonoscopy was done within the last 5 years and a verbal report given to me by the patient it was negative.

## 2018-01-22 DIAGNOSIS — I13.10 BENIGN HYPERTENSIVE HEART AND RENAL DISEASE: Primary | ICD-10-CM

## 2018-01-25 DIAGNOSIS — N18.30 ANEMIA OF CHRONIC RENAL FAILURE, STAGE 3 (MODERATE) (HCC): ICD-10-CM

## 2018-01-25 DIAGNOSIS — D63.1 ANEMIA OF CHRONIC KIDNEY FAILURE, STAGE 3 (MODERATE) (HCC): ICD-10-CM

## 2018-01-25 DIAGNOSIS — D63.1 ANEMIA OF CHRONIC RENAL FAILURE, STAGE 3 (MODERATE) (HCC): ICD-10-CM

## 2018-01-25 DIAGNOSIS — N18.30 ANEMIA OF CHRONIC KIDNEY FAILURE, STAGE 3 (MODERATE) (HCC): ICD-10-CM

## 2018-01-30 ENCOUNTER — OFFICE VISIT (OUTPATIENT)
Dept: ONCOLOGY | Facility: CLINIC | Age: 78
End: 2018-01-30

## 2018-01-30 ENCOUNTER — LAB (OUTPATIENT)
Dept: LAB | Facility: HOSPITAL | Age: 78
End: 2018-01-30

## 2018-01-30 ENCOUNTER — INFUSION (OUTPATIENT)
Dept: ONCOLOGY | Facility: HOSPITAL | Age: 78
End: 2018-01-30
Attending: INTERNAL MEDICINE

## 2018-01-30 VITALS
WEIGHT: 129 LBS | HEIGHT: 62 IN | HEART RATE: 65 BPM | TEMPERATURE: 97.1 F | DIASTOLIC BLOOD PRESSURE: 64 MMHG | SYSTOLIC BLOOD PRESSURE: 127 MMHG | RESPIRATION RATE: 18 BRPM | BODY MASS INDEX: 23.74 KG/M2 | OXYGEN SATURATION: 100 %

## 2018-01-30 VITALS
DIASTOLIC BLOOD PRESSURE: 78 MMHG | BODY MASS INDEX: 23.72 KG/M2 | HEART RATE: 92 BPM | WEIGHT: 128.9 LBS | SYSTOLIC BLOOD PRESSURE: 132 MMHG | OXYGEN SATURATION: 96 % | RESPIRATION RATE: 16 BRPM | HEIGHT: 62 IN

## 2018-01-30 DIAGNOSIS — N18.30 ANEMIA OF CHRONIC RENAL FAILURE, STAGE 3 (MODERATE) (HCC): ICD-10-CM

## 2018-01-30 DIAGNOSIS — D63.1 ANEMIA OF CHRONIC KIDNEY FAILURE, STAGE 3 (MODERATE) (HCC): ICD-10-CM

## 2018-01-30 DIAGNOSIS — N18.30 ANEMIA OF CHRONIC RENAL FAILURE, STAGE 3 (MODERATE) (HCC): Primary | ICD-10-CM

## 2018-01-30 DIAGNOSIS — D63.1 ANEMIA OF CHRONIC RENAL FAILURE, STAGE 3 (MODERATE) (HCC): ICD-10-CM

## 2018-01-30 DIAGNOSIS — N18.30 ANEMIA OF CHRONIC KIDNEY FAILURE, STAGE 3 (MODERATE) (HCC): ICD-10-CM

## 2018-01-30 DIAGNOSIS — D63.1 ANEMIA OF CHRONIC RENAL FAILURE, STAGE 3 (MODERATE) (HCC): Primary | ICD-10-CM

## 2018-01-30 LAB
ALBUMIN SERPL-MCNC: 3.1 G/DL (ref 3.5–5)
ALBUMIN/GLOB SERPL: 1.1 G/DL (ref 1.1–2.5)
ALP SERPL-CCNC: 76 U/L (ref 24–120)
ALT SERPL W P-5'-P-CCNC: 17 U/L (ref 0–54)
ANION GAP SERPL CALCULATED.3IONS-SCNC: 12 MMOL/L (ref 4–13)
AST SERPL-CCNC: 18 U/L (ref 7–45)
AUTO MIXED CELLS #: 0.7 10*3/MM3 (ref 0.1–2.6)
AUTO MIXED CELLS %: 11.5 % (ref 0.1–24)
BILIRUB SERPL-MCNC: 0.1 MG/DL (ref 0.1–1)
BUN BLD-MCNC: 32 MG/DL (ref 5–21)
BUN/CREAT SERPL: 12.5
CALCIUM SPEC-SCNC: 8.8 MG/DL (ref 8.4–10.4)
CHLORIDE SERPL-SCNC: 110 MMOL/L (ref 98–110)
CO2 SERPL-SCNC: 18 MMOL/L (ref 24–31)
CREAT BLD-MCNC: 2.57 MG/DL (ref 0.5–1.4)
ERYTHROCYTE [DISTWIDTH] IN BLOOD BY AUTOMATED COUNT: 15.1 % (ref 12–15)
GFR SERPL CREATININE-BSD FRML MDRD: 18 ML/MIN/1.73
GLOBULIN UR ELPH-MCNC: 2.8 GM/DL
GLUCOSE BLD-MCNC: 109 MG/DL (ref 70–100)
HCT VFR BLD AUTO: 31.6 % (ref 37–47)
HGB BLD-MCNC: 10.4 G/DL (ref 12–16)
HOLD SPECIMEN: NORMAL
LYMPHOCYTES # BLD AUTO: 1.7 10*3/MM3 (ref 0.8–7)
LYMPHOCYTES NFR BLD AUTO: 28 % (ref 15–45)
MCH RBC QN AUTO: 30.1 PG (ref 28–32)
MCHC RBC AUTO-ENTMCNC: 32.9 G/DL (ref 33–36)
MCV RBC AUTO: 91.3 FL (ref 82–98)
NEUTROPHILS # BLD AUTO: 3.6 10*3/MM3 (ref 1.5–8.3)
NEUTROPHILS NFR BLD AUTO: 60.5 % (ref 39–78)
PLATELET # BLD AUTO: 225 10*3/MM3 (ref 130–400)
PMV BLD AUTO: 9.1 FL (ref 6–12)
POTASSIUM BLD-SCNC: 3 MMOL/L (ref 3.5–5.3)
PROT SERPL-MCNC: 5.9 G/DL (ref 6.3–8.7)
RBC # BLD AUTO: 3.46 10*6/MM3 (ref 4.2–5.4)
SODIUM BLD-SCNC: 140 MMOL/L (ref 135–145)
WBC NRBC COR # BLD: 6 10*3/MM3 (ref 4.8–10.8)

## 2018-01-30 PROCEDURE — 96372 THER/PROPH/DIAG INJ SC/IM: CPT

## 2018-01-30 PROCEDURE — 99214 OFFICE O/P EST MOD 30 MIN: CPT | Performed by: INTERNAL MEDICINE

## 2018-01-30 PROCEDURE — 25010000002 CYANOCOBALAMIN PER 1000 MCG: Performed by: INTERNAL MEDICINE

## 2018-01-30 PROCEDURE — 80053 COMPREHEN METABOLIC PANEL: CPT

## 2018-01-30 PROCEDURE — 36415 COLL VENOUS BLD VENIPUNCTURE: CPT

## 2018-01-30 PROCEDURE — 63510000001 EPOETIN ALFA PER 1000 UNITS: Performed by: INTERNAL MEDICINE

## 2018-01-30 PROCEDURE — 85025 COMPLETE CBC W/AUTO DIFF WBC: CPT

## 2018-01-30 RX ORDER — CYANOCOBALAMIN 1000 UG/ML
1000 INJECTION, SOLUTION INTRAMUSCULAR; SUBCUTANEOUS ONCE
Status: COMPLETED | OUTPATIENT
Start: 2018-01-30 | End: 2018-01-30

## 2018-01-30 RX ORDER — REPOSITORY CORTICOTROPIN 80 [USP'U]/ML
INJECTION INTRAMUSCULAR; SUBCUTANEOUS
COMMUNITY
Start: 2018-01-25

## 2018-01-30 RX ORDER — CYANOCOBALAMIN 1000 UG/ML
1000 INJECTION, SOLUTION INTRAMUSCULAR; SUBCUTANEOUS ONCE
Status: CANCELLED | OUTPATIENT
Start: 2018-01-30

## 2018-01-30 RX ORDER — CYANOCOBALAMIN 1000 UG/ML
1000 INJECTION, SOLUTION INTRAMUSCULAR; SUBCUTANEOUS ONCE
Status: CANCELLED
Start: 2018-01-30 | End: 2018-01-30

## 2018-01-30 RX ADMIN — CYANOCOBALAMIN 1000 MCG: 1000 INJECTION, SOLUTION INTRAMUSCULAR at 15:03

## 2018-01-30 RX ADMIN — ERYTHROPOIETIN 40000 UNITS: 40000 INJECTION, SOLUTION INTRAVENOUS; SUBCUTANEOUS at 15:09

## 2018-01-30 NOTE — PROGRESS NOTES
Patient ID:   Susanne Valentine is a 77 y.o. female.  Referring Physician:   García Wood MD  [address] [facility] [address]  [place] [address]  Primary Care Provider:  Saman Castorena DO    Assessment/Plan:  Assessment   Patient Active Problem List   Diagnosis   • Anemia of chronic kidney failure   • Anemia of chronic renal failure, stage 3 (moderate)   • B12 deficiency     Cancer Staging Information:  No matching staging information was found for the patient.    Diagnoses and all orders for this visit:    Anemia of chronic renal failure, stage 3 (moderate)  -     Clinic Appointment Request  -     CBC and Differential; Future  -     Lab Appointment Request; Future  -     STAT Comprehensive Metabolic Panel; Future  -     Clinic Appointment Request; Future  -     ONCBCN INFUSION APPOINTMENT REQUEST 01; Future    Anemia of chronic kidney failure, stage 3 (moderate)  -     Clinic Appointment Request  -     CBC and Differential; Future  -     Lab Appointment Request; Future  -     STAT Comprehensive Metabolic Panel; Future  -     Clinic Appointment Request; Future  -     ONCBCN INFUSION APPOINTMENT REQUEST 01; Future      The patient has a stable anemia from her chronic kidney disease, with the use of Procrit once a month as necessary.  We will hold the treatment today, and have her come back in 1 month with a repeat CBC, we will proceed with Procrit at 40,000 units subcutaneously if her hemoglobin is less than 10 g/DL.  Patient ID:   Susanne Valentine is a 77 y.o. female.  Referring Physician:   García Wood MD  [address] [facility] [address]  [place] [address]  Primary Care Provider:  Saman Castorena DO    Assessment/Plan:  Assessment   Patient Active Problem List   Diagnosis   • Anemia of chronic kidney failure   • Anemia of chronic renal failure, stage 3 (moderate)   • B12 deficiency     Cancer Staging Information:  No matching staging information was found for the  patient.    Diagnoses and all orders for this visit:    Anemia of chronic renal failure, stage 3 (moderate)  -     Clinic Appointment Request  -     CBC and Differential; Future  -     Lab Appointment Request; Future  -     STAT Comprehensive Metabolic Panel; Future  -     Clinic Appointment Request; Future  -     ONCBCN INFUSION APPOINTMENT REQUEST 01; Future    Anemia of chronic kidney failure, stage 3 (moderate)  -     Clinic Appointment Request  -     CBC and Differential; Future  -     Lab Appointment Request; Future  -     STAT Comprehensive Metabolic Panel; Future  -     Clinic Appointment Request; Future  -     ONCBCN INFUSION APPOINTMENT REQUEST ; Future      The patient has a stable anemia from her chronic kidney disease, with the use of Procrit once a month as necessary.  We will hold the treatment today, and have her come back in 1 month with a repeat CBC, we will proceed with Procrit at 40,000 units subcutaneously if her hemoglobin is less than 10 g/DL.     Interval History:  The patient is here for follow-up and for possible consideration for Procrit.  She has no new complaints at this time and has been doing well.  Apparently her   5 years ago, and she reminded me that I am seeing her in the past when I was still here.  She did not get any Procrit on her last visit    Interval Notes:  The following portions of the patient's history were reviewed and updated as appropriate: allergies, current medications, past family history, past medical history, past social history, past surgical history and problem list.     History of Present Illness   76-year-old white lady with chronic kidney disease has an anemia and has been on ESAs since . She has been tolerating it well and her anemia has been stable with a supplement.    Review of Systems   Constitutional: Negative.    HENT: Negative.    Eyes: Negative.    Respiratory: Negative.    Cardiovascular: Negative.    Gastrointestinal: Negative.   "  Endocrine: Negative.    Genitourinary: Negative.    Musculoskeletal: Negative.    Allergic/Immunologic: Negative.    Neurological: Negative.    Hematological: Negative.    Psychiatric/Behavioral: Negative.         Physical Exam:  Vital Signs for this encounter:  BSA: 1.59 meters squared  /78  Pulse 92  Resp 16  Ht 157.5 cm (62.01\")  Wt 58.5 kg (128 lb 14.4 oz)  LMP  (LMP Unknown)  SpO2 96%  BMI 23.57 kg/m2    Physical Exam   Constitutional: She is oriented to person, place, and time. She appears well-developed and well-nourished. No distress.   HENT:   Head: Normocephalic and atraumatic.   Nose: Nose normal.   Mouth/Throat: Oropharynx is clear and moist. No oropharyngeal exudate.   Eyes: Conjunctivae and EOM are normal. Pupils are equal, round, and reactive to light. No scleral icterus.   Neck: Normal range of motion. Neck supple. No JVD present. No thyromegaly present.   Cardiovascular: Normal rate, regular rhythm and normal heart sounds.  Exam reveals no gallop and no friction rub.    Pulmonary/Chest: Effort normal and breath sounds normal. No respiratory distress. She has no wheezes. She has no rales. She exhibits no tenderness.   Abdominal: Soft. Bowel sounds are normal. She exhibits no distension. There is no tenderness. There is no guarding.   Musculoskeletal: Normal range of motion. She exhibits no edema or tenderness.   Lymphadenopathy:     She has no cervical adenopathy.   Neurological: She is alert and oriented to person, place, and time. No cranial nerve deficit.   Skin: Skin is warm and dry.   Few purpuric spots, forearms   Psychiatric: She has a normal mood and affect. Her behavior is normal. Judgment and thought content normal.       Performance Status:  Asymptomatic    Results:  WBC   Date Value Ref Range Status   01/30/2018 6.00 4.80 - 10.80 10*3/mm3 Final     Hemoglobin   Date Value Ref Range Status   01/30/2018 10.4 (L) 12.0 - 16.0 g/dL Final     Hematocrit   Date Value Ref Range " "Status   2018 31.6 (L) 37.0 - 47.0 % Final     Platelets   Date Value Ref Range Status   2018 225 130 - 400 10*3/mm3 Final     Creatinine   Date Value Ref Range Status   2017 2.49 (H) 0.50 - 1.40 mg/dL Final     AST (SGOT)   Date Value Ref Range Status   2017 19 7 - 45 U/L Final        Interval History:  The patient is here for follow-up and for possible consideration for Procrit.  She has no new complaints at this time and has been doing well.  Apparently her   5 years ago, and she reminded me that I am seeing her in the past when I was still here.  She did not get any Procrit on her last visit    Interval Notes:  The following portions of the patient's history were reviewed and updated as appropriate: allergies, current medications, past family history, past medical history, past social history, past surgical history and problem list.     History of Present Illness   76-year-old white lady with chronic kidney disease has an anemia and has been on ESAs since . She has been tolerating it well and her anemia has been stable with a supplement.    Review of Systems   Constitutional: Negative.    HENT: Negative.    Eyes: Negative.    Respiratory: Negative.    Cardiovascular: Negative.    Gastrointestinal: Negative.    Endocrine: Negative.    Genitourinary: Negative.    Musculoskeletal: Negative.    Allergic/Immunologic: Negative.    Neurological: Negative.    Hematological: Negative.    Psychiatric/Behavioral: Negative.         Physical Exam:  Vital Signs for this encounter:  BSA: 1.59 meters squared  /78  Pulse 92  Resp 16  Ht 157.5 cm (62.01\")  Wt 58.5 kg (128 lb 14.4 oz)  LMP  (LMP Unknown)  SpO2 96%  BMI 23.57 kg/m2    Physical Exam   Constitutional: She is oriented to person, place, and time. She appears well-developed and well-nourished. No distress.   HENT:   Head: Normocephalic and atraumatic.   Nose: Nose normal.   Mouth/Throat: Oropharynx is clear and moist. " No oropharyngeal exudate.   Eyes: Conjunctivae and EOM are normal. Pupils are equal, round, and reactive to light. No scleral icterus.   Neck: Normal range of motion. Neck supple. No JVD present. No thyromegaly present.   Cardiovascular: Normal rate, regular rhythm and normal heart sounds.  Exam reveals no gallop and no friction rub.    Pulmonary/Chest: Effort normal and breath sounds normal. No respiratory distress. She has no wheezes. She has no rales. She exhibits no tenderness.   Abdominal: Soft. Bowel sounds are normal. She exhibits no distension. There is no tenderness. There is no guarding.   Musculoskeletal: Normal range of motion. She exhibits no edema or tenderness.   Lymphadenopathy:     She has no cervical adenopathy.   Neurological: She is alert and oriented to person, place, and time. No cranial nerve deficit.   Skin: Skin is warm and dry.   Few purpuric spots, forearms   Psychiatric: She has a normal mood and affect. Her behavior is normal. Judgment and thought content normal.       Performance Status:  Asymptomatic    Results:  WBC   Date Value Ref Range Status   01/30/2018 6.00 4.80 - 10.80 10*3/mm3 Final     Hemoglobin   Date Value Ref Range Status   01/30/2018 10.4 (L) 12.0 - 16.0 g/dL Final     Hematocrit   Date Value Ref Range Status   01/30/2018 31.6 (L) 37.0 - 47.0 % Final     Platelets   Date Value Ref Range Status   01/30/2018 225 130 - 400 10*3/mm3 Final     Creatinine   Date Value Ref Range Status   12/28/2017 2.49 (H) 0.50 - 1.40 mg/dL Final     AST (SGOT)   Date Value Ref Range Status   12/28/2017 19 7 - 45 U/L Final     Anemia of chronic kidney disease.  Hemoglobin today is 10.4gm%,  continue Procrit.  Patient clinically asymptomatic.  Over the last 3 months for MCV of, therefore I am would check vitamin B12 and folic acid on today's draw and I have asked her to start taking oral folic acid 400 µg daily as well as start receiving vitamin B12 1000 µg subcutaneous every 4 weekly.  If the  MCV remains high in another 3-4 months I may consider doing a bone marrow biopsy to diagnose myelodysplasia.  The last colonoscopy was done within the last 5 years and a verbal report given to me by the patient it was negative.

## 2018-02-20 DIAGNOSIS — D63.1 ANEMIA OF CHRONIC KIDNEY FAILURE, STAGE 3 (MODERATE) (HCC): ICD-10-CM

## 2018-02-20 DIAGNOSIS — N18.30 ANEMIA OF CHRONIC KIDNEY FAILURE, STAGE 3 (MODERATE) (HCC): ICD-10-CM

## 2018-02-20 DIAGNOSIS — N18.30 ANEMIA OF CHRONIC RENAL FAILURE, STAGE 3 (MODERATE) (HCC): ICD-10-CM

## 2018-02-20 DIAGNOSIS — D63.1 ANEMIA OF CHRONIC RENAL FAILURE, STAGE 3 (MODERATE) (HCC): ICD-10-CM

## 2018-02-23 LAB
ALBUMIN SERPL-MCNC: 3.1 G/DL (ref 3.5–5.2)
ALP BLD-CCNC: 59 U/L (ref 35–104)
ALT SERPL-CCNC: 8 U/L (ref 5–33)
ANION GAP SERPL CALCULATED.3IONS-SCNC: 19 MMOL/L (ref 7–19)
AST SERPL-CCNC: 12 U/L (ref 5–32)
BILIRUB SERPL-MCNC: 0.3 MG/DL (ref 0.2–1.2)
BUN BLDV-MCNC: 47 MG/DL (ref 8–23)
CALCIUM SERPL-MCNC: 7.5 MG/DL (ref 8.8–10.2)
CHLORIDE BLD-SCNC: 94 MMOL/L (ref 98–111)
CO2: 18 MMOL/L (ref 22–29)
CREAT SERPL-MCNC: 2.7 MG/DL (ref 0.5–0.9)
GFR NON-AFRICAN AMERICAN: 17
GLUCOSE BLD-MCNC: 127 MG/DL (ref 74–109)
MAGNESIUM: 1.5 MG/DL (ref 1.6–2.4)
POTASSIUM SERPL-SCNC: 2.6 MMOL/L (ref 3.5–5)
SODIUM BLD-SCNC: 131 MMOL/L (ref 136–145)
TOTAL PROTEIN: 5.4 G/DL (ref 6.6–8.7)

## 2018-02-26 LAB
ALBUMIN SERPL-MCNC: 3 G/DL (ref 3.5–5.2)
ALP BLD-CCNC: 59 U/L (ref 35–104)
ALT SERPL-CCNC: 8 U/L (ref 5–33)
ANION GAP SERPL CALCULATED.3IONS-SCNC: 17 MMOL/L (ref 7–19)
AST SERPL-CCNC: 12 U/L (ref 5–32)
BILIRUB SERPL-MCNC: <0.2 MG/DL (ref 0.2–1.2)
BUN BLDV-MCNC: 44 MG/DL (ref 8–23)
CALCIUM SERPL-MCNC: 8.5 MG/DL (ref 8.8–10.2)
CHLORIDE BLD-SCNC: 102 MMOL/L (ref 98–111)
CO2: 19 MMOL/L (ref 22–29)
CREAT SERPL-MCNC: 2.8 MG/DL (ref 0.5–0.9)
GFR NON-AFRICAN AMERICAN: 16
GLUCOSE BLD-MCNC: 87 MG/DL (ref 74–109)
MAGNESIUM: 1.8 MG/DL (ref 1.6–2.4)
PHOSPHORUS: 3.7 MG/DL (ref 2.5–4.5)
POTASSIUM SERPL-SCNC: 3.5 MMOL/L (ref 3.5–5)
SODIUM BLD-SCNC: 138 MMOL/L (ref 136–145)
TOTAL PROTEIN: 5.3 G/DL (ref 6.6–8.7)
URIC ACID, SERUM: 6.7 MG/DL (ref 2.4–5.7)

## 2018-02-27 ENCOUNTER — INFUSION (OUTPATIENT)
Dept: ONCOLOGY | Facility: HOSPITAL | Age: 78
End: 2018-02-27
Attending: INTERNAL MEDICINE

## 2018-02-27 ENCOUNTER — OFFICE VISIT (OUTPATIENT)
Dept: ONCOLOGY | Facility: CLINIC | Age: 78
End: 2018-02-27

## 2018-02-27 ENCOUNTER — LAB (OUTPATIENT)
Dept: LAB | Facility: HOSPITAL | Age: 78
End: 2018-02-27
Attending: INTERNAL MEDICINE

## 2018-02-27 VITALS
TEMPERATURE: 97.2 F | BODY MASS INDEX: 27.02 KG/M2 | HEART RATE: 75 BPM | WEIGHT: 146.8 LBS | HEIGHT: 62 IN | SYSTOLIC BLOOD PRESSURE: 124 MMHG | DIASTOLIC BLOOD PRESSURE: 78 MMHG | RESPIRATION RATE: 18 BRPM | OXYGEN SATURATION: 97 %

## 2018-02-27 DIAGNOSIS — N18.30 ANEMIA OF CHRONIC KIDNEY FAILURE, STAGE 3 (MODERATE) (HCC): ICD-10-CM

## 2018-02-27 DIAGNOSIS — N18.30 ANEMIA OF CHRONIC RENAL FAILURE, STAGE 3 (MODERATE) (HCC): ICD-10-CM

## 2018-02-27 DIAGNOSIS — D63.1 ANEMIA OF CHRONIC KIDNEY FAILURE, STAGE 3 (MODERATE) (HCC): ICD-10-CM

## 2018-02-27 DIAGNOSIS — D63.1 ANEMIA OF CHRONIC RENAL FAILURE, STAGE 3 (MODERATE) (HCC): ICD-10-CM

## 2018-02-27 LAB
ALBUMIN SERPL-MCNC: 3.3 G/DL (ref 3.5–5)
ALBUMIN/GLOB SERPL: 1.2 G/DL (ref 1.1–2.5)
ALP SERPL-CCNC: 65 U/L (ref 24–120)
ALT SERPL W P-5'-P-CCNC: <15 U/L (ref 0–54)
ANION GAP SERPL CALCULATED.3IONS-SCNC: 13 MMOL/L (ref 4–13)
AST SERPL-CCNC: 27 U/L (ref 7–45)
BASOPHILS # BLD AUTO: 0.01 10*3/MM3 (ref 0–0.2)
BASOPHILS NFR BLD AUTO: 0.1 % (ref 0–2)
BILIRUB SERPL-MCNC: 0.2 MG/DL (ref 0.1–1)
BUN BLD-MCNC: 42 MG/DL (ref 5–21)
BUN/CREAT SERPL: 15.6 (ref 7–25)
CALCIUM SPEC-SCNC: 9 MG/DL (ref 8.4–10.4)
CHLORIDE SERPL-SCNC: 99 MMOL/L (ref 98–110)
CO2 SERPL-SCNC: 18 MMOL/L (ref 24–31)
CREAT BLD-MCNC: 2.69 MG/DL (ref 0.5–1.4)
DEPRECATED RDW RBC AUTO: 52.5 FL (ref 40–54)
EOSINOPHIL # BLD AUTO: 0 10*3/MM3 (ref 0–0.7)
EOSINOPHIL NFR BLD AUTO: 0 % (ref 0–4)
ERYTHROCYTE [DISTWIDTH] IN BLOOD BY AUTOMATED COUNT: 15.4 % (ref 12–15)
GFR SERPL CREATININE-BSD FRML MDRD: 17 ML/MIN/1.73
GLOBULIN UR ELPH-MCNC: 2.7 GM/DL
GLUCOSE BLD-MCNC: 147 MG/DL (ref 70–100)
HCT VFR BLD AUTO: 34.8 % (ref 37–47)
HGB BLD-MCNC: 11.1 G/DL (ref 12–16)
HOLD SPECIMEN: NORMAL
HOLD SPECIMEN: NORMAL
IMM GRANULOCYTES # BLD: 0.09 10*3/MM3 (ref 0–0.03)
IMM GRANULOCYTES NFR BLD: 0.7 % (ref 0–5)
LYMPHOCYTES # BLD AUTO: 0.67 10*3/MM3 (ref 0.72–4.86)
LYMPHOCYTES NFR BLD AUTO: 5.1 % (ref 15–45)
MCH RBC QN AUTO: 29.4 PG (ref 28–32)
MCHC RBC AUTO-ENTMCNC: 31.9 G/DL (ref 33–36)
MCV RBC AUTO: 92.1 FL (ref 82–98)
MONOCYTES # BLD AUTO: 0.43 10*3/MM3 (ref 0.19–1.3)
MONOCYTES NFR BLD AUTO: 3.3 % (ref 4–12)
NEUTROPHILS # BLD AUTO: 11.92 10*3/MM3 (ref 1.87–8.4)
NEUTROPHILS NFR BLD AUTO: 90.8 % (ref 39–78)
NRBC BLD MANUAL-RTO: 0 /100 WBC (ref 0–0)
PLATELET # BLD AUTO: 300 10*3/MM3 (ref 130–400)
PMV BLD AUTO: 9.9 FL (ref 6–12)
POTASSIUM BLD-SCNC: 3.7 MMOL/L (ref 3.5–5.3)
PROT SERPL-MCNC: 6 G/DL (ref 6.3–8.7)
RBC # BLD AUTO: 3.78 10*6/MM3 (ref 4.2–5.4)
SODIUM BLD-SCNC: 130 MMOL/L (ref 135–145)
WBC NRBC COR # BLD: 13.12 10*3/MM3 (ref 4.8–10.8)

## 2018-02-27 PROCEDURE — 85025 COMPLETE CBC W/AUTO DIFF WBC: CPT | Performed by: INTERNAL MEDICINE

## 2018-02-27 PROCEDURE — 80053 COMPREHEN METABOLIC PANEL: CPT | Performed by: INTERNAL MEDICINE

## 2018-02-27 PROCEDURE — 36415 COLL VENOUS BLD VENIPUNCTURE: CPT

## 2018-02-27 PROCEDURE — 99214 OFFICE O/P EST MOD 30 MIN: CPT | Performed by: INTERNAL MEDICINE

## 2018-02-27 RX ORDER — POTASSIUM CHLORIDE 1.5 G/1.77G
20 POWDER, FOR SOLUTION ORAL 3 TIMES DAILY
COMMUNITY
End: 2019-01-28 | Stop reason: SDUPTHER

## 2018-02-27 RX ORDER — BUMETANIDE 2 MG/1
2 TABLET ORAL 2 TIMES DAILY
COMMUNITY

## 2018-02-27 NOTE — PROGRESS NOTES
Patient ID:   Susanne Valentine is a 77 y.o. female.  Referring Physician:   García Wood MD  [address] [place] [address] [address]  [place] [address]  Primary Care Provider:  Saman Castorena DO    Assessment/Plan:  Assessment   Patient Active Problem List   Diagnosis   • Anemia of chronic kidney failure   • Anemia of chronic renal failure, stage 3 (moderate)   • B12 deficiency     Cancer Staging Information:  No matching staging information was found for the patient.    Susanne was seen today for follow-up.    Diagnoses and all orders for this visit:    Anemia of chronic renal failure, stage 3 (moderate)  -     Clinic Appointment Request    Anemia of chronic kidney failure, stage 3 (moderate)  -     Clinic Appointment Request      The patient has a stable anemia from her chronic kidney disease, with the use of Procrit once a month as necessary.  We will hold the treatment today, and have her come back in 1 month with a repeat CBC, we will proceed with Procrit at 40,000 units subcutaneously if her hemoglobin is less than 10 g/DL.  Patient ID:   Susanne Valentine is a 77 y.o. female.  Referring Physician:   García Wood MD  [facility] [address]  [place] [address]  Primary Care Provider:  Saman Castorena DO    Assessment/Plan:  Assessment   Patient Active Problem List   Diagnosis   • Anemia of chronic kidney failure   • Anemia of chronic renal failure, stage 3 (moderate)   • B12 deficiency     Cancer Staging Information:  No matching staging information was found for the patient.    Susanne was seen today for follow-up.    Diagnoses and all orders for this visit:    Anemia of chronic renal failure, stage 3 (moderate)  -     Clinic Appointment Request    Anemia of chronic kidney failure, stage 3 (moderate)  -     Clinic Appointment Request      The patient has a stable anemia from her chronic kidney disease, with the use of Procrit once a month as necessary.  We will hold the treatment  "today, and have her come back in 1 month with a repeat CBC, we will proceed with Procrit at 40,000 units subcutaneously if her hemoglobin is less than 10 g/DL.     Interval History:  The patient is here for follow-up and for possible consideration for Procrit.  She has no new complaints at this time and has been doing well.  Apparently her   5 years ago, and she reminded me that I am seeing her in the past when I was still here.  She did not get any Procrit on her last visit    Interval Notes:  The following portions of the patient's history were reviewed and updated as appropriate: allergies, current medications, past family history, past medical history, past social history, past surgical history and problem list.     History of Present Illness   76-year-old white lady with chronic kidney disease has an anemia and has been on ESAs since . She has been tolerating it well and her anemia has been stable with a supplement.    Review of Systems   Constitutional: Negative.    HENT: Negative.    Eyes: Negative.    Respiratory: Negative.    Cardiovascular: Negative.    Gastrointestinal: Negative.    Endocrine: Negative.    Genitourinary: Negative.    Musculoskeletal: Negative.    Allergic/Immunologic: Negative.    Neurological: Negative.    Hematological: Negative.    Psychiatric/Behavioral: Negative.         Physical Exam:  Vital Signs for this encounter:  BSA: 1.68 meters squared  /78  Pulse 75  Temp 97.2 °F (36.2 °C) (Tympanic)   Resp 18  Ht 157.5 cm (62.01\")  Wt 66.6 kg (146 lb 12.8 oz)  LMP  (LMP Unknown)  SpO2 97%  BMI 26.84 kg/m2    Physical Exam   Constitutional: She is oriented to person, place, and time. She appears well-developed and well-nourished. No distress.   HENT:   Head: Normocephalic and atraumatic.   Nose: Nose normal.   Mouth/Throat: Oropharynx is clear and moist. No oropharyngeal exudate.   Eyes: Conjunctivae and EOM are normal. Pupils are equal, round, and reactive to " light. No scleral icterus.   Neck: Normal range of motion. Neck supple. No JVD present. No thyromegaly present.   Cardiovascular: Normal rate, regular rhythm and normal heart sounds.  Exam reveals no gallop and no friction rub.    Pulmonary/Chest: Effort normal and breath sounds normal. No respiratory distress. She has no wheezes. She has no rales. She exhibits no tenderness.   Abdominal: Soft. Bowel sounds are normal. She exhibits no distension. There is no tenderness. There is no guarding.   Musculoskeletal: Normal range of motion. She exhibits no edema or tenderness.   Lymphadenopathy:     She has no cervical adenopathy.   Neurological: She is alert and oriented to person, place, and time. No cranial nerve deficit.   Skin: Skin is warm and dry.   Few purpuric spots, forearms   Psychiatric: She has a normal mood and affect. Her behavior is normal. Judgment and thought content normal.       Performance Status:  Asymptomatic    Results:  WBC   Date Value Ref Range Status   2018 13.12 (H) 4.80 - 10.80 10*3/mm3 Final     Hemoglobin   Date Value Ref Range Status   2018 11.1 (L) 12.0 - 16.0 g/dL Final     Hematocrit   Date Value Ref Range Status   2018 34.8 (L) 37.0 - 47.0 % Final     Platelets   Date Value Ref Range Status   2018 300 130 - 400 10*3/mm3 Final     Creatinine   Date Value Ref Range Status   2018 2.69 (H) 0.50 - 1.40 mg/dL Final     AST (SGOT)   Date Value Ref Range Status   2018 27 7 - 45 U/L Final        Interval History:  The patient is here for follow-up and for possible consideration for Procrit.  She has no new complaints at this time and has been doing well.  Apparently her   5 years ago, and she reminded me that I am seeing her in the past when I was still here.  She did not get any Procrit on her last visit    Interval Notes:  The following portions of the patient's history were reviewed and updated as appropriate: allergies, current medications, past  "family history, past medical history, past social history, past surgical history and problem list.     History of Present Illness   76-year-old white lady with chronic kidney disease has an anemia and has been on ESAs since 2008. She has been tolerating it well and her anemia has been stable with a supplement.    Review of Systems   Constitutional: Negative.    HENT: Negative.    Eyes: Negative.    Respiratory: Negative.    Cardiovascular: Negative.    Gastrointestinal: Negative.    Endocrine: Negative.    Genitourinary: Negative.    Musculoskeletal: Negative.    Allergic/Immunologic: Negative.    Neurological: Negative.    Hematological: Negative.    Psychiatric/Behavioral: Negative.         Physical Exam:  Vital Signs for this encounter:  BSA: 1.68 meters squared  /78  Pulse 75  Temp 97.2 °F (36.2 °C) (Tympanic)   Resp 18  Ht 157.5 cm (62.01\")  Wt 66.6 kg (146 lb 12.8 oz)  LMP  (LMP Unknown)  SpO2 97%  BMI 26.84 kg/m2    Physical Exam   Constitutional: She is oriented to person, place, and time. She appears well-developed and well-nourished. No distress.   HENT:   Head: Normocephalic and atraumatic.   Nose: Nose normal.   Mouth/Throat: Oropharynx is clear and moist. No oropharyngeal exudate.   Eyes: Conjunctivae and EOM are normal. Pupils are equal, round, and reactive to light. No scleral icterus.   Neck: Normal range of motion. Neck supple. No JVD present. No thyromegaly present.   Cardiovascular: Normal rate, regular rhythm and normal heart sounds.  Exam reveals no gallop and no friction rub.    Pulmonary/Chest: Effort normal and breath sounds normal. No respiratory distress. She has no wheezes. She has no rales. She exhibits no tenderness.   Abdominal: Soft. Bowel sounds are normal. She exhibits no distension. There is no tenderness. There is no guarding.   Musculoskeletal: Normal range of motion. She exhibits no edema or tenderness.   Lymphadenopathy:     She has no cervical adenopathy. "   Neurological: She is alert and oriented to person, place, and time. No cranial nerve deficit.   Skin: Skin is warm and dry.   Few purpuric spots, forearms   Psychiatric: She has a normal mood and affect. Her behavior is normal. Judgment and thought content normal.       Performance Status:  Asymptomatic    Results:  WBC   Date Value Ref Range Status   02/27/2018 13.12 (H) 4.80 - 10.80 10*3/mm3 Final     Hemoglobin   Date Value Ref Range Status   02/27/2018 11.1 (L) 12.0 - 16.0 g/dL Final     Hematocrit   Date Value Ref Range Status   02/27/2018 34.8 (L) 37.0 - 47.0 % Final     Platelets   Date Value Ref Range Status   02/27/2018 300 130 - 400 10*3/mm3 Final     Creatinine   Date Value Ref Range Status   02/27/2018 2.69 (H) 0.50 - 1.40 mg/dL Final     AST (SGOT)   Date Value Ref Range Status   02/27/2018 27 7 - 45 U/L Final     Anemia of chronic kidney disease.  Hemoglobin today is 10.4gm%,  continue Procrit.  Patient clinically asymptomatic.  Over the last 3 months for MCV of, therefore I am would check vitamin B12 and folic acid on today's draw and I have asked her to start taking oral folic acid 400 µg daily as well as start receiving vitamin B12 1000 µg subcutaneous every 4 weekly.  If the MCV remains high in another 3-4 months I may consider doing a bone marrow biopsy to diagnose myelodysplasia.  The last colonoscopy was done within the last 5 years and a verbal report given to me by the patient it was negative.    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 I am not sure if ACTHER has help improve her Hgb 11.1gm%today.  Any way per CMS guidelines, if the Hg bis over 11gm% Procrit is NOT reimbursable, therefore, no Procrit today.  RTC 1 month.

## 2018-02-28 LAB
ALBUMIN SERPL-MCNC: 3.1 G/DL (ref 3.5–5.2)
ALP BLD-CCNC: 53 U/L (ref 35–104)
ALT SERPL-CCNC: 8 U/L (ref 5–33)
ANION GAP SERPL CALCULATED.3IONS-SCNC: 10 MMOL/L (ref 7–19)
AST SERPL-CCNC: 15 U/L (ref 5–32)
BACTERIA: NEGATIVE /HPF
BILIRUB SERPL-MCNC: 0.3 MG/DL (ref 0.2–1.2)
BILIRUBIN URINE: NEGATIVE
BLOOD, URINE: ABNORMAL
BUN BLDV-MCNC: 43 MG/DL (ref 8–23)
CALCIUM SERPL-MCNC: 8.9 MG/DL (ref 8.8–10.2)
CHLORIDE BLD-SCNC: 99 MMOL/L (ref 98–111)
CLARITY: CLEAR
CO2: 24 MMOL/L (ref 22–29)
COLOR: YELLOW
CREAT SERPL-MCNC: 2.8 MG/DL (ref 0.5–0.9)
CREATININE URINE: 25.6 MG/DL (ref 4.2–622)
EPITHELIAL CELLS, UA: 1 /HPF (ref 0–5)
GFR NON-AFRICAN AMERICAN: 16
GLUCOSE BLD-MCNC: 94 MG/DL (ref 74–109)
GLUCOSE URINE: NEGATIVE MG/DL
HYALINE CASTS: 2 /HPF (ref 0–8)
KETONES, URINE: NEGATIVE MG/DL
LEUKOCYTE ESTERASE, URINE: ABNORMAL
MAGNESIUM: 1.9 MG/DL (ref 1.6–2.4)
NITRITE, URINE: NEGATIVE
PH UA: 5.5
PHOSPHORUS: 3.1 MG/DL (ref 2.5–4.5)
POTASSIUM SERPL-SCNC: 4.8 MMOL/L (ref 3.5–5)
PROTEIN PROTEIN: 143 MG/DL (ref 15–45)
PROTEIN UA: 100 MG/DL
RBC UA: 2 /HPF (ref 0–4)
SODIUM BLD-SCNC: 133 MMOL/L (ref 136–145)
SPECIFIC GRAVITY UA: 1.01
TOTAL PROTEIN: 5.4 G/DL (ref 6.6–8.7)
URIC ACID, SERUM: 6.1 MG/DL (ref 2.4–5.7)
UROBILINOGEN, URINE: 0.2 E.U./DL
WBC UA: 3 /HPF (ref 0–5)

## 2018-03-05 ENCOUNTER — TELEPHONE (OUTPATIENT)
Dept: PRIMARY CARE CLINIC | Age: 78
End: 2018-03-05

## 2018-03-05 DIAGNOSIS — E03.9 HYPOTHYROIDISM, UNSPECIFIED TYPE: ICD-10-CM

## 2018-03-05 RX ORDER — LEVOTHYROXINE SODIUM 0.05 MG/1
TABLET ORAL
Qty: 30 TABLET | Refills: 11 | Status: SHIPPED | OUTPATIENT
Start: 2018-03-05 | End: 2019-02-21

## 2018-03-08 ENCOUNTER — OFFICE VISIT (OUTPATIENT)
Dept: PRIMARY CARE CLINIC | Age: 78
End: 2018-03-08
Payer: MEDICARE

## 2018-03-08 VITALS
DIASTOLIC BLOOD PRESSURE: 70 MMHG | HEART RATE: 102 BPM | OXYGEN SATURATION: 98 % | BODY MASS INDEX: 24.66 KG/M2 | WEIGHT: 134.8 LBS | TEMPERATURE: 97.4 F | SYSTOLIC BLOOD PRESSURE: 128 MMHG

## 2018-03-08 DIAGNOSIS — E03.9 ACQUIRED HYPOTHYROIDISM: ICD-10-CM

## 2018-03-08 DIAGNOSIS — N18.4 CKD (CHRONIC KIDNEY DISEASE), STAGE IV (HCC): Primary | ICD-10-CM

## 2018-03-08 DIAGNOSIS — I10 ESSENTIAL HYPERTENSION: ICD-10-CM

## 2018-03-08 LAB
ALBUMIN SERPL-MCNC: 3 G/DL (ref 3.5–5.2)
ALP BLD-CCNC: 60 U/L (ref 35–104)
ALT SERPL-CCNC: 8 U/L (ref 5–33)
ANION GAP SERPL CALCULATED.3IONS-SCNC: 15 MMOL/L (ref 7–19)
AST SERPL-CCNC: 14 U/L (ref 5–32)
BACTERIA: NEGATIVE /HPF
BASOPHILS ABSOLUTE: 0 K/UL (ref 0–0.2)
BASOPHILS RELATIVE PERCENT: 0.2 % (ref 0–1)
BILIRUB SERPL-MCNC: <0.2 MG/DL (ref 0.2–1.2)
BILIRUBIN URINE: NEGATIVE
BLOOD, URINE: NEGATIVE
BUN BLDV-MCNC: 47 MG/DL (ref 8–23)
CALCIUM SERPL-MCNC: 8.7 MG/DL (ref 8.8–10.2)
CHLORIDE BLD-SCNC: 99 MMOL/L (ref 98–111)
CLARITY: CLEAR
CO2: 23 MMOL/L (ref 22–29)
COLOR: YELLOW
CREAT SERPL-MCNC: 2.7 MG/DL (ref 0.5–0.9)
CREATININE URINE: 23.8 MG/DL (ref 4.2–622)
EOSINOPHILS ABSOLUTE: 0 K/UL (ref 0–0.6)
EOSINOPHILS RELATIVE PERCENT: 0.2 % (ref 0–5)
EPITHELIAL CELLS, UA: 0 /HPF (ref 0–5)
GFR NON-AFRICAN AMERICAN: 17
GLUCOSE BLD-MCNC: 108 MG/DL (ref 74–109)
GLUCOSE URINE: NEGATIVE MG/DL
HCT VFR BLD CALC: 35.3 % (ref 37–47)
HEMOGLOBIN: 10.9 G/DL (ref 12–16)
HYALINE CASTS: 1 /HPF (ref 0–8)
KETONES, URINE: NEGATIVE MG/DL
LEUKOCYTE ESTERASE, URINE: NEGATIVE
LYMPHOCYTES ABSOLUTE: 1 K/UL (ref 1.1–4.5)
LYMPHOCYTES RELATIVE PERCENT: 9.2 % (ref 20–40)
MAGNESIUM: 1.7 MG/DL (ref 1.6–2.4)
MCH RBC QN AUTO: 29.9 PG (ref 27–31)
MCHC RBC AUTO-ENTMCNC: 30.9 G/DL (ref 33–37)
MCV RBC AUTO: 97 FL (ref 81–99)
MONOCYTES ABSOLUTE: 0.6 K/UL (ref 0–0.9)
MONOCYTES RELATIVE PERCENT: 5.8 % (ref 0–10)
NEUTROPHILS ABSOLUTE: 9 K/UL (ref 1.5–7.5)
NEUTROPHILS RELATIVE PERCENT: 83.7 % (ref 50–65)
NITRITE, URINE: NEGATIVE
PDW BLD-RTO: 15.2 % (ref 11.5–14.5)
PH UA: 7
PHOSPHORUS: 3.2 MG/DL (ref 2.5–4.5)
PLATELET # BLD: 254 K/UL (ref 130–400)
PMV BLD AUTO: 9.6 FL (ref 9.4–12.3)
POTASSIUM SERPL-SCNC: 4 MMOL/L (ref 3.5–5)
PROTEIN PROTEIN: 157 MG/DL (ref 15–45)
PROTEIN UA: 100 MG/DL
RBC # BLD: 3.64 M/UL (ref 4.2–5.4)
RBC UA: 1 /HPF (ref 0–4)
SODIUM BLD-SCNC: 137 MMOL/L (ref 136–145)
SPECIFIC GRAVITY UA: 1.01
TOTAL PROTEIN: 5.3 G/DL (ref 6.6–8.7)
URIC ACID, SERUM: 6.9 MG/DL (ref 2.4–5.7)
UROBILINOGEN, URINE: 0.2 E.U./DL
WBC # BLD: 10.7 K/UL (ref 4.8–10.8)
WBC UA: 1 /HPF (ref 0–5)

## 2018-03-08 PROCEDURE — G8482 FLU IMMUNIZE ORDER/ADMIN: HCPCS | Performed by: INTERNAL MEDICINE

## 2018-03-08 PROCEDURE — 99213 OFFICE O/P EST LOW 20 MIN: CPT | Performed by: INTERNAL MEDICINE

## 2018-03-08 PROCEDURE — G8399 PT W/DXA RESULTS DOCUMENT: HCPCS | Performed by: INTERNAL MEDICINE

## 2018-03-08 PROCEDURE — G8427 DOCREV CUR MEDS BY ELIG CLIN: HCPCS | Performed by: INTERNAL MEDICINE

## 2018-03-08 PROCEDURE — 1123F ACP DISCUSS/DSCN MKR DOCD: CPT | Performed by: INTERNAL MEDICINE

## 2018-03-08 PROCEDURE — G8420 CALC BMI NORM PARAMETERS: HCPCS | Performed by: INTERNAL MEDICINE

## 2018-03-08 PROCEDURE — 1090F PRES/ABSN URINE INCON ASSESS: CPT | Performed by: INTERNAL MEDICINE

## 2018-03-08 PROCEDURE — 1036F TOBACCO NON-USER: CPT | Performed by: INTERNAL MEDICINE

## 2018-03-08 PROCEDURE — 4040F PNEUMOC VAC/ADMIN/RCVD: CPT | Performed by: INTERNAL MEDICINE

## 2018-03-08 RX ORDER — POTASSIUM CHLORIDE 1500 MG/1
20 TABLET, FILM COATED, EXTENDED RELEASE ORAL
COMMUNITY
End: 2018-06-01 | Stop reason: ALTCHOICE

## 2018-03-08 RX ORDER — BUMETANIDE 2 MG/1
2 TABLET ORAL 2 TIMES DAILY
Status: ON HOLD | COMMUNITY
End: 2018-06-04 | Stop reason: HOSPADM

## 2018-03-08 RX ORDER — CLONIDINE HYDROCHLORIDE 0.2 MG/1
0.2 TABLET ORAL 2 TIMES DAILY
Status: ON HOLD | COMMUNITY
End: 2018-06-04 | Stop reason: HOSPADM

## 2018-03-08 ASSESSMENT — ENCOUNTER SYMPTOMS
SINUS PAIN: 0
NAUSEA: 0
SHORTNESS OF BREATH: 0
VOMITING: 0
DIARRHEA: 0
CONSTIPATION: 0
COUGH: 0
SINUS PRESSURE: 0
BACK PAIN: 0

## 2018-03-08 NOTE — PROGRESS NOTES
[facility] PRIMARY CARE  [facility]  [address] [place] [address]  Dept: [phone]  Dept Fax: [phone]  Loc: [phone]    Genie Coronado is a 65 y.o. female who presents today for her medical conditions/complaints as noted below. Genie Coronado is c/o of   Chief Complaint   Patient presents with    Follow-up     4 month          HPI:     HPI  Ms. Glynis Phalen returns for follow-up of hypertension, stage IV CKD, hypothyroidism. She is clinically doing well. She continues have close follow-ups with nephrology. Hemoglobin A1C (%)   Date Value   07/25/2016 5.3             ( goal A1C is < 7)   No results found for: LABMICR  LDL Calculated (mg/dL)   Date Value   07/25/2016 156   04/25/2016 171   01/04/2016 166 (H)       (goal LDL is <100)   AST (U/L)   Date Value   03/26/2018 13     ALT (U/L)   Date Value   03/26/2018 9     BUN (mg/dL)   Date Value   03/26/2018 46 (H)     BP Readings from Last 3 Encounters:   03/08/18 128/70   11/02/17 136/70   08/21/17 122/78          (goal 120/80)    Past Medical History:   Diagnosis Date    Acid reflux     Anemia     Chronic kidney disease     sees Dr. Osmin Samson Gout     Hypertension     Hypothyroidism     Mitral valve prolapse       Past Surgical History:   Procedure Laterality Date    BREAST SURGERY      Left breast biopsy    CHOLECYSTECTOMY      COLONOSCOPY  2010    f/u 3-5 years Dr. Mulugeta Arredondo  1/22/2010    DR. SANCHEZ    HERNIA REPAIR      TUBAL LIGATION      UPPER GASTROINTESTINAL ENDOSCOPY  2/22/2006    DR. SANCHEZ       Family History   Problem Relation Age of Onset    Breast Cancer Mother 80    Hypertension Mother     Heart Disease Mother     Colon Polyps Mother     Hypertension Sister     Breast Cancer Maternal Aunt     Colon Cancer Neg Hx        Social History   Substance Use Topics    Smoking status: Never Smoker    Smokeless tobacco: Never Used    Alcohol use No Current Outpatient Prescriptions   Medication Sig Dispense Refill    cloNIDine (CATAPRES) 0.2 MG tablet Take 0.2 mg by mouth 2 times daily      bumetanide (BUMEX) 2 MG tablet Take 2 mg by mouth 2 times daily      potassium chloride (KLOR-CON M) 20 MEQ TBCR extended release tablet Take 20 mEq by mouth 3 times daily (with meals)      corticotropin (HP ACTHAR) 80 UNIT/ML injectable gel Inject 40 Units into the muscle Twice a Week      levothyroxine (SYNTHROID) 50 MCG tablet TAKE ONE TABLET BY MOUTH DAILY 30 tablet 11    calcitRIOL (ROCALTROL) 0.25 MCG capsule Take 1 capsule by mouth every other day Mondays, Wednesdays, and Fridays 30 capsule 5    amLODIPine (NORVASC) 5 MG tablet Take 1 tablet by mouth nightly 30 tablet 11    allopurinol (ZYLOPRIM) 100 MG tablet Take 1 tablet by mouth daily 30 tablet 3    sodium bicarbonate 325 MG tablet Take 325 mg by mouth 2 times daily      spironolactone (ALDACTONE) 25 MG tablet Take 1 tablet by mouth 2 times daily 60 tablet 3    Cholecalciferol (VITAMIN D3) 2000 UNITS CAPS Take 1,000 Units by mouth 2 times daily       lisinopril (PRINIVIL;ZESTRIL) 20 MG tablet 40 mg daily.  aspirin 81 MG tablet Take 81 mg by mouth daily.  furosemide (LASIX) 40 MG tablet TAKE ONE TABLET BY MOUTH DAILY 90 tablet 3    Multiple Vitamins-Minerals (PRESERVISION AREDS 2 PO) Take by mouth 2 times daily      Omega-3 Fatty Acids (FISH OIL) 300 MG CAPS Take  by mouth 4 times daily.  Cyanocobalamin (VITAMIN B-12 IJ) Inject  as directed.  ferrous sulfate 325 (65 FE) MG tablet Take 325 mg by mouth daily (with breakfast). No current facility-administered medications for this visit.       Allergies   Allergen Reactions    Statins Other (See Comments)     Muscle aches        Health Maintenance   Topic Date Due    DTaP/Tdap/Td vaccine (1 - Tdap) 06/09/1958    Shingles Vaccine (1 of 2 - 2 Dose Series) 06/09/1989    Colon cancer screen colonoscopy  06/09/2014    medications. Follow-up again in 4 months. No orders of the defined types were placed in this encounter. No orders of the defined types were placed in this encounter. Patient given educational materials - see patient instructions. Discussed use, benefit, and side effects of prescribed medications. All patient questions answered. Pt voiced understanding. Reviewed health maintenance. Instructed to continue current medications, diet and exercise. Patient agreed with treatment plan. Follow up as directed.      Electronically signed by Lakshmi Martines DO on 3/30/2018 at 10:18 AM

## 2018-03-19 LAB
ALBUMIN SERPL-MCNC: 3.2 G/DL (ref 3.5–5.2)
ALP BLD-CCNC: 71 U/L (ref 35–104)
ALT SERPL-CCNC: 9 U/L (ref 5–33)
ANION GAP SERPL CALCULATED.3IONS-SCNC: 14 MMOL/L (ref 7–19)
AST SERPL-CCNC: 14 U/L (ref 5–32)
BACTERIA: NEGATIVE /HPF
BASOPHILS ABSOLUTE: 0 K/UL (ref 0–0.2)
BASOPHILS RELATIVE PERCENT: 0.2 % (ref 0–1)
BILIRUB SERPL-MCNC: <0.2 MG/DL (ref 0.2–1.2)
BILIRUBIN URINE: NEGATIVE
BLOOD, URINE: NEGATIVE
BUN BLDV-MCNC: 44 MG/DL (ref 8–23)
CALCIUM SERPL-MCNC: 8.6 MG/DL (ref 8.8–10.2)
CHLORIDE BLD-SCNC: 101 MMOL/L (ref 98–111)
CLARITY: CLEAR
CO2: 22 MMOL/L (ref 22–29)
COLOR: YELLOW
CREAT SERPL-MCNC: 2.5 MG/DL (ref 0.5–0.9)
CREATININE URINE: 53.2 MG/DL (ref 4.2–622)
EOSINOPHILS ABSOLUTE: 0 K/UL (ref 0–0.6)
EOSINOPHILS RELATIVE PERCENT: 0.1 % (ref 0–5)
EPITHELIAL CELLS, UA: 1 /HPF (ref 0–5)
GFR NON-AFRICAN AMERICAN: 19
GLUCOSE BLD-MCNC: 105 MG/DL (ref 74–109)
GLUCOSE URINE: 100 MG/DL
HCT VFR BLD CALC: 34.3 % (ref 37–47)
HEMOGLOBIN: 10.6 G/DL (ref 12–16)
HYALINE CASTS: 0 /HPF (ref 0–8)
KETONES, URINE: NEGATIVE MG/DL
LEUKOCYTE ESTERASE, URINE: NEGATIVE
LYMPHOCYTES ABSOLUTE: 1.3 K/UL (ref 1.1–4.5)
LYMPHOCYTES RELATIVE PERCENT: 11.3 % (ref 20–40)
MAGNESIUM: 2 MG/DL (ref 1.6–2.4)
MCH RBC QN AUTO: 29.8 PG (ref 27–31)
MCHC RBC AUTO-ENTMCNC: 30.9 G/DL (ref 33–37)
MCV RBC AUTO: 96.3 FL (ref 81–99)
MONOCYTES ABSOLUTE: 0.5 K/UL (ref 0–0.9)
MONOCYTES RELATIVE PERCENT: 4.4 % (ref 0–10)
NEUTROPHILS ABSOLUTE: 9.2 K/UL (ref 1.5–7.5)
NEUTROPHILS RELATIVE PERCENT: 81.9 % (ref 50–65)
NITRITE, URINE: NEGATIVE
PDW BLD-RTO: 15.2 % (ref 11.5–14.5)
PH UA: 6.5
PHOSPHORUS: 2.8 MG/DL (ref 2.5–4.5)
PLATELET # BLD: 292 K/UL (ref 130–400)
PMV BLD AUTO: 10.1 FL (ref 9.4–12.3)
POTASSIUM SERPL-SCNC: 4.4 MMOL/L (ref 3.5–5)
PRO-BNP: 5893 PG/ML (ref 0–1800)
PROTEIN PROTEIN: 254 MG/DL (ref 15–45)
PROTEIN UA: 300 MG/DL
RBC # BLD: 3.56 M/UL (ref 4.2–5.4)
RBC UA: 1 /HPF (ref 0–4)
SODIUM BLD-SCNC: 137 MMOL/L (ref 136–145)
SPECIFIC GRAVITY UA: 1.01
TOTAL PROTEIN: 5.7 G/DL (ref 6.6–8.7)
URIC ACID, SERUM: 6.4 MG/DL (ref 2.4–5.7)
UROBILINOGEN, URINE: 0.2 E.U./DL
WBC # BLD: 11.2 K/UL (ref 4.8–10.8)
WBC UA: 1 /HPF (ref 0–5)

## 2018-03-26 LAB
ALBUMIN SERPL-MCNC: 3.2 G/DL (ref 3.5–5.2)
ALP BLD-CCNC: 65 U/L (ref 35–104)
ALT SERPL-CCNC: 9 U/L (ref 5–33)
ANION GAP SERPL CALCULATED.3IONS-SCNC: 15 MMOL/L (ref 7–19)
AST SERPL-CCNC: 13 U/L (ref 5–32)
BACTERIA: NEGATIVE /HPF
BASOPHILS ABSOLUTE: 0 K/UL (ref 0–0.2)
BASOPHILS RELATIVE PERCENT: 0.2 % (ref 0–1)
BILIRUB SERPL-MCNC: <0.2 MG/DL (ref 0.2–1.2)
BILIRUBIN URINE: NEGATIVE
BLOOD, URINE: ABNORMAL
BUN BLDV-MCNC: 46 MG/DL (ref 8–23)
CALCIUM SERPL-MCNC: 8.5 MG/DL (ref 8.8–10.2)
CHLORIDE BLD-SCNC: 102 MMOL/L (ref 98–111)
CLARITY: CLEAR
CO2: 22 MMOL/L (ref 22–29)
COLOR: YELLOW
CREAT SERPL-MCNC: 2.8 MG/DL (ref 0.5–0.9)
CREATININE URINE: 35.5 MG/DL (ref 4.2–622)
EOSINOPHILS ABSOLUTE: 0 K/UL (ref 0–0.6)
EOSINOPHILS RELATIVE PERCENT: 0 % (ref 0–5)
EPITHELIAL CELLS, UA: 1 /HPF (ref 0–5)
GFR NON-AFRICAN AMERICAN: 16
GLUCOSE BLD-MCNC: 107 MG/DL (ref 74–109)
GLUCOSE URINE: 100 MG/DL
HCT VFR BLD CALC: 34.1 % (ref 37–47)
HEMOGLOBIN: 10.3 G/DL (ref 12–16)
HYALINE CASTS: 5 /HPF (ref 0–8)
KETONES, URINE: NEGATIVE MG/DL
LEUKOCYTE ESTERASE, URINE: NEGATIVE
LYMPHOCYTES ABSOLUTE: 0.8 K/UL (ref 1.1–4.5)
LYMPHOCYTES RELATIVE PERCENT: 8.2 % (ref 20–40)
MAGNESIUM: 1.6 MG/DL (ref 1.6–2.4)
MCH RBC QN AUTO: 29.6 PG (ref 27–31)
MCHC RBC AUTO-ENTMCNC: 30.2 G/DL (ref 33–37)
MCV RBC AUTO: 98 FL (ref 81–99)
MONOCYTES ABSOLUTE: 0.2 K/UL (ref 0–0.9)
MONOCYTES RELATIVE PERCENT: 2.3 % (ref 0–10)
NEUTROPHILS ABSOLUTE: 8.8 K/UL (ref 1.5–7.5)
NEUTROPHILS RELATIVE PERCENT: 88.1 % (ref 50–65)
NITRITE, URINE: NEGATIVE
PDW BLD-RTO: 15.1 % (ref 11.5–14.5)
PH UA: 5
PHOSPHORUS: 3.2 MG/DL (ref 2.5–4.5)
PLATELET # BLD: 284 K/UL (ref 130–400)
PMV BLD AUTO: 9.6 FL (ref 9.4–12.3)
POTASSIUM SERPL-SCNC: 3.9 MMOL/L (ref 3.5–5)
PRO-BNP: 7770 PG/ML (ref 0–1800)
PROTEIN PROTEIN: 236 MG/DL (ref 15–45)
PROTEIN UA: 300 MG/DL
RBC # BLD: 3.48 M/UL (ref 4.2–5.4)
RBC UA: 4 /HPF (ref 0–4)
SODIUM BLD-SCNC: 139 MMOL/L (ref 136–145)
SPECIFIC GRAVITY UA: 1.01
TOTAL PROTEIN: 5.5 G/DL (ref 6.6–8.7)
URIC ACID, SERUM: 7 MG/DL (ref 2.4–5.7)
UROBILINOGEN, URINE: 0.2 E.U./DL
WBC # BLD: 10 K/UL (ref 4.8–10.8)
WBC UA: 1 /HPF (ref 0–5)

## 2018-03-28 ENCOUNTER — OFFICE VISIT (OUTPATIENT)
Dept: ONCOLOGY | Facility: CLINIC | Age: 78
End: 2018-03-28

## 2018-03-28 ENCOUNTER — INFUSION (OUTPATIENT)
Dept: ONCOLOGY | Facility: HOSPITAL | Age: 78
End: 2018-03-28
Attending: INTERNAL MEDICINE

## 2018-03-28 ENCOUNTER — LAB (OUTPATIENT)
Dept: LAB | Facility: HOSPITAL | Age: 78
End: 2018-03-28

## 2018-03-28 VITALS
DIASTOLIC BLOOD PRESSURE: 70 MMHG | TEMPERATURE: 97.7 F | BODY MASS INDEX: 26.5 KG/M2 | SYSTOLIC BLOOD PRESSURE: 138 MMHG | OXYGEN SATURATION: 100 % | RESPIRATION RATE: 20 BRPM | WEIGHT: 144 LBS | HEART RATE: 81 BPM | HEIGHT: 62 IN

## 2018-03-28 VITALS
RESPIRATION RATE: 16 BRPM | HEART RATE: 84 BPM | SYSTOLIC BLOOD PRESSURE: 120 MMHG | TEMPERATURE: 97.6 F | DIASTOLIC BLOOD PRESSURE: 78 MMHG | WEIGHT: 144.8 LBS | HEIGHT: 62 IN | OXYGEN SATURATION: 98 % | BODY MASS INDEX: 26.65 KG/M2

## 2018-03-28 DIAGNOSIS — D63.1 ANEMIA OF CHRONIC RENAL FAILURE, STAGE 3 (MODERATE) (HCC): ICD-10-CM

## 2018-03-28 DIAGNOSIS — N18.30 ANEMIA OF CHRONIC RENAL FAILURE, STAGE 3 (MODERATE) (HCC): Primary | ICD-10-CM

## 2018-03-28 DIAGNOSIS — N18.30 ANEMIA OF CHRONIC KIDNEY FAILURE, STAGE 3 (MODERATE) (HCC): ICD-10-CM

## 2018-03-28 DIAGNOSIS — D63.1 ANEMIA OF CHRONIC KIDNEY FAILURE, STAGE 3 (MODERATE) (HCC): ICD-10-CM

## 2018-03-28 DIAGNOSIS — N18.30 ANEMIA OF CHRONIC RENAL FAILURE, STAGE 3 (MODERATE) (HCC): ICD-10-CM

## 2018-03-28 DIAGNOSIS — D63.1 ANEMIA OF CHRONIC RENAL FAILURE, STAGE 3 (MODERATE) (HCC): Primary | ICD-10-CM

## 2018-03-28 LAB
ALBUMIN SERPL-MCNC: 2.9 G/DL (ref 3.5–5)
ALBUMIN/GLOB SERPL: 1.2 G/DL (ref 1.1–2.5)
ALP SERPL-CCNC: 61 U/L (ref 24–120)
ALT SERPL W P-5'-P-CCNC: 20 U/L (ref 0–54)
ANION GAP SERPL CALCULATED.3IONS-SCNC: 9 MMOL/L (ref 4–13)
AST SERPL-CCNC: 26 U/L (ref 7–45)
BASOPHILS # BLD AUTO: 0.01 10*3/MM3 (ref 0–0.2)
BASOPHILS NFR BLD AUTO: 0.1 % (ref 0–2)
BILIRUB SERPL-MCNC: 0.1 MG/DL (ref 0.1–1)
BUN BLD-MCNC: 48 MG/DL (ref 5–21)
BUN/CREAT SERPL: 18 (ref 7–25)
CALCIUM SPEC-SCNC: 8.6 MG/DL (ref 8.4–10.4)
CHLORIDE SERPL-SCNC: 99 MMOL/L (ref 98–110)
CO2 SERPL-SCNC: 25 MMOL/L (ref 24–31)
CREAT BLD-MCNC: 2.67 MG/DL (ref 0.5–1.4)
DEPRECATED RDW RBC AUTO: 49.8 FL (ref 40–54)
EOSINOPHIL # BLD AUTO: 0.03 10*3/MM3 (ref 0–0.7)
EOSINOPHIL NFR BLD AUTO: 0.3 % (ref 0–4)
ERYTHROCYTE [DISTWIDTH] IN BLOOD BY AUTOMATED COUNT: 14.8 % (ref 12–15)
GFR SERPL CREATININE-BSD FRML MDRD: 17 ML/MIN/1.73
GLOBULIN UR ELPH-MCNC: 2.4 GM/DL
GLUCOSE BLD-MCNC: 88 MG/DL (ref 70–100)
HCT VFR BLD AUTO: 29.5 % (ref 37–47)
HGB BLD-MCNC: 9.6 G/DL (ref 12–16)
HOLD SPECIMEN: NORMAL
HOLD SPECIMEN: NORMAL
IMM GRANULOCYTES # BLD: 0.17 10*3/MM3 (ref 0–0.03)
IMM GRANULOCYTES NFR BLD: 1.5 % (ref 0–5)
LYMPHOCYTES # BLD AUTO: 2.38 10*3/MM3 (ref 0.72–4.86)
LYMPHOCYTES NFR BLD AUTO: 20.8 % (ref 15–45)
MCH RBC QN AUTO: 30 PG (ref 28–32)
MCHC RBC AUTO-ENTMCNC: 32.5 G/DL (ref 33–36)
MCV RBC AUTO: 92.2 FL (ref 82–98)
MONOCYTES # BLD AUTO: 1.18 10*3/MM3 (ref 0.19–1.3)
MONOCYTES NFR BLD AUTO: 10.3 % (ref 4–12)
NEUTROPHILS # BLD AUTO: 7.68 10*3/MM3 (ref 1.87–8.4)
NEUTROPHILS NFR BLD AUTO: 67 % (ref 39–78)
NRBC BLD MANUAL-RTO: 0 /100 WBC (ref 0–0)
PLATELET # BLD AUTO: 294 10*3/MM3 (ref 130–400)
PMV BLD AUTO: 10.1 FL (ref 6–12)
POTASSIUM BLD-SCNC: 3.5 MMOL/L (ref 3.5–5.3)
PROT SERPL-MCNC: 5.3 G/DL (ref 6.3–8.7)
RBC # BLD AUTO: 3.2 10*6/MM3 (ref 4.2–5.4)
SODIUM BLD-SCNC: 133 MMOL/L (ref 135–145)
WBC NRBC COR # BLD: 11.45 10*3/MM3 (ref 4.8–10.8)

## 2018-03-28 PROCEDURE — 96372 THER/PROPH/DIAG INJ SC/IM: CPT

## 2018-03-28 PROCEDURE — 85025 COMPLETE CBC W/AUTO DIFF WBC: CPT | Performed by: INTERNAL MEDICINE

## 2018-03-28 PROCEDURE — 63510000001 EPOETIN ALFA PER 1000 UNITS: Performed by: INTERNAL MEDICINE

## 2018-03-28 PROCEDURE — 80053 COMPREHEN METABOLIC PANEL: CPT | Performed by: INTERNAL MEDICINE

## 2018-03-28 PROCEDURE — 36415 COLL VENOUS BLD VENIPUNCTURE: CPT

## 2018-03-28 PROCEDURE — 25010000002 CYANOCOBALAMIN PER 1000 MCG: Performed by: INTERNAL MEDICINE

## 2018-03-28 PROCEDURE — 99214 OFFICE O/P EST MOD 30 MIN: CPT | Performed by: INTERNAL MEDICINE

## 2018-03-28 RX ORDER — CYANOCOBALAMIN 1000 UG/ML
1000 INJECTION, SOLUTION INTRAMUSCULAR; SUBCUTANEOUS ONCE
Status: CANCELLED
Start: 2018-03-28 | End: 2018-03-28

## 2018-03-28 RX ORDER — CYANOCOBALAMIN 1000 UG/ML
1000 INJECTION, SOLUTION INTRAMUSCULAR; SUBCUTANEOUS ONCE
Status: COMPLETED | OUTPATIENT
Start: 2018-03-28 | End: 2018-03-28

## 2018-03-28 RX ADMIN — CYANOCOBALAMIN 1000 MCG: 1000 INJECTION, SOLUTION INTRAMUSCULAR at 14:29

## 2018-03-28 RX ADMIN — ERYTHROPOIETIN 40000 UNITS: 40000 INJECTION, SOLUTION INTRAVENOUS; SUBCUTANEOUS at 14:27

## 2018-03-28 NOTE — PROGRESS NOTES
Patient ID:   Susanne Valentine is a 77 y.o. female.  Referring Physician:   García Wood MD  [address] [place] [address] [address]  [place] [address]  Primary Care Provider:  Saman Castorena DO    Assessment/Plan:  Assessment   Patient Active Problem List   Diagnosis   • Anemia of chronic kidney failure   • Anemia of chronic renal failure, stage 3 (moderate)   • B12 deficiency     Cancer Staging Information:  No matching staging information was found for the patient.    Diagnoses and all orders for this visit:    Anemia of chronic renal failure, stage 3 (moderate)  -     Clinic Appointment Request    Anemia of chronic kidney failure, stage 3 (moderate)  -     Clinic Appointment Request      The patient has a stable anemia from her chronic kidney disease, with the use of Procrit once a month as necessary.  We will hold the treatment today, and have her come back in 1 month with a repeat CBC, we will proceed with Procrit at 40,000 units subcutaneously if her hemoglobin is less than 10 g/DL.  Patient ID:   Susanne Valentine is a 77 y.o. female.  Referring Physician:   García Wood MD  [address] [place] [address] [address] [place] [address]  Primary Care Provider:  Saman Castorena DO    Assessment/Plan:  Assessment   Patient Active Problem List   Diagnosis   • Anemia of chronic kidney failure   • Anemia of chronic renal failure, stage 3 (moderate)   • B12 deficiency     Cancer Staging Information:  No matching staging information was found for the patient.    Diagnoses and all orders for this visit:    Anemia of chronic renal failure, stage 3 (moderate)  -     Clinic Appointment Request    Anemia of chronic kidney failure, stage 3 (moderate)  -     Clinic Appointment Request      The patient has a stable anemia from her chronic kidney disease, with the use of Procrit once a month as necessary.  We will hold the treatment today, and have her come back in 1 month with a repeat CBC, we will proceed with  "Procrit at 40,000 units subcutaneously if her hemoglobin is less than 10 g/DL.     Interval History:  The patient is here for follow-up and for possible consideration for Procrit.  She has no new complaints at this time and has been doing well.  Apparently her   5 years ago, and she reminded me that I am seeing her in the past when I was still here.  She did not get any Procrit on her last visit    Interval Notes:  The following portions of the patient's history were reviewed and updated as appropriate: allergies, current medications, past family history, past medical history, past social history, past surgical history and problem list.     History of Present Illness   76-year-old white lady with chronic kidney disease has an anemia and has been on ESAs since . She has been tolerating it well and her anemia has been stable with a supplement.    Review of Systems   Constitutional: Negative.    HENT: Negative.    Eyes: Negative.    Respiratory: Negative.    Cardiovascular: Negative.    Gastrointestinal: Negative.    Endocrine: Negative.    Genitourinary: Negative.    Musculoskeletal: Negative.    Allergic/Immunologic: Negative.    Neurological: Negative.    Hematological: Negative.    Psychiatric/Behavioral: Negative.         Physical Exam:  Vital Signs for this encounter:  BSA: 1.67 meters squared  /78   Pulse 84   Temp 97.6 °F (36.4 °C) (Tympanic)   Resp 16   Ht 157.5 cm (62.01\")   Wt 65.7 kg (144 lb 12.8 oz)   SpO2 98%   BMI 26.48 kg/m²     Physical Exam   Constitutional: She is oriented to person, place, and time. She appears well-developed and well-nourished. No distress.   HENT:   Head: Normocephalic and atraumatic.   Nose: Nose normal.   Mouth/Throat: Oropharynx is clear and moist. No oropharyngeal exudate.   Eyes: Conjunctivae and EOM are normal. Pupils are equal, round, and reactive to light. No scleral icterus.   Neck: Normal range of motion. Neck supple. No JVD present. No " thyromegaly present.   Cardiovascular: Normal rate, regular rhythm and normal heart sounds.  Exam reveals no gallop and no friction rub.    Pulmonary/Chest: Effort normal and breath sounds normal. No respiratory distress. She has no wheezes. She has no rales. She exhibits no tenderness.   Abdominal: Soft. Bowel sounds are normal. She exhibits no distension. There is no tenderness. There is no guarding.   Musculoskeletal: Normal range of motion. She exhibits no edema or tenderness.   Lymphadenopathy:     She has no cervical adenopathy.   Neurological: She is alert and oriented to person, place, and time. No cranial nerve deficit.   Skin: Skin is warm and dry.   Few purpuric spots, forearms   Psychiatric: She has a normal mood and affect. Her behavior is normal. Judgment and thought content normal.       Performance Status:  Asymptomatic    Results:  WBC   Date Value Ref Range Status   2018 11.45 (H) 4.80 - 10.80 10*3/mm3 Final     Hemoglobin   Date Value Ref Range Status   2018 9.6 (L) 12.0 - 16.0 g/dL Final     Hematocrit   Date Value Ref Range Status   2018 29.5 (L) 37.0 - 47.0 % Final     Platelets   Date Value Ref Range Status   2018 294 130 - 400 10*3/mm3 Final     Creatinine   Date Value Ref Range Status   2018 2.67 (H) 0.50 - 1.40 mg/dL Final     AST (SGOT)   Date Value Ref Range Status   2018 26 7 - 45 U/L Final        Interval History:  The patient is here for follow-up and for possible consideration for Procrit.  She has no new complaints at this time and has been doing well.  Apparently her   5 years ago, and she reminded me that I am seeing her in the past when I was still here.  She did not get any Procrit on her last visit    Interval Notes:  The following portions of the patient's history were reviewed and updated as appropriate: allergies, current medications, past family history, past medical history, past social history, past surgical history and  "problem list.     History of Present Illness   76-year-old white lady with chronic kidney disease has an anemia and has been on ESAs since 2008. She has been tolerating it well and her anemia has been stable with a supplement.    Review of Systems   Constitutional: Negative.    HENT: Negative.    Eyes: Negative.    Respiratory: Negative.    Cardiovascular: Negative.    Gastrointestinal: Negative.    Endocrine: Negative.    Genitourinary: Negative.    Musculoskeletal: Negative.    Allergic/Immunologic: Negative.    Neurological: Negative.    Hematological: Negative.    Psychiatric/Behavioral: Negative.         Physical Exam:  Vital Signs for this encounter:  BSA: 1.67 meters squared  /78   Pulse 84   Temp 97.6 °F (36.4 °C) (Tympanic)   Resp 16   Ht 157.5 cm (62.01\")   Wt 65.7 kg (144 lb 12.8 oz)   SpO2 98%   BMI 26.48 kg/m²     Physical Exam   Constitutional: She is oriented to person, place, and time. She appears well-developed and well-nourished. No distress.   HENT:   Head: Normocephalic and atraumatic.   Nose: Nose normal.   Mouth/Throat: Oropharynx is clear and moist. No oropharyngeal exudate.   Eyes: Conjunctivae and EOM are normal. Pupils are equal, round, and reactive to light. No scleral icterus.   Neck: Normal range of motion. Neck supple. No JVD present. No thyromegaly present.   Cardiovascular: Normal rate, regular rhythm and normal heart sounds.  Exam reveals no gallop and no friction rub.    Pulmonary/Chest: Effort normal and breath sounds normal. No respiratory distress. She has no wheezes. She has no rales. She exhibits no tenderness.   Abdominal: Soft. Bowel sounds are normal. She exhibits no distension. There is no tenderness. There is no guarding.   Musculoskeletal: Normal range of motion. She exhibits no edema or tenderness.   Lymphadenopathy:     She has no cervical adenopathy.   Neurological: She is alert and oriented to person, place, and time. No cranial nerve deficit.   Skin: " Skin is warm and dry.   Few purpuric spots, forearms   Psychiatric: She has a normal mood and affect. Her behavior is normal. Judgment and thought content normal.       Performance Status:  Asymptomatic    Results:  WBC   Date Value Ref Range Status   03/28/2018 11.45 (H) 4.80 - 10.80 10*3/mm3 Final     Hemoglobin   Date Value Ref Range Status   03/28/2018 9.6 (L) 12.0 - 16.0 g/dL Final     Hematocrit   Date Value Ref Range Status   03/28/2018 29.5 (L) 37.0 - 47.0 % Final     Platelets   Date Value Ref Range Status   03/28/2018 294 130 - 400 10*3/mm3 Final     Creatinine   Date Value Ref Range Status   03/28/2018 2.67 (H) 0.50 - 1.40 mg/dL Final     AST (SGOT)   Date Value Ref Range Status   03/28/2018 26 7 - 45 U/L Final     Anemia of chronic kidney disease.  Hemoglobin today is 10.4gm%,  continue Procrit.  Patient clinically asymptomatic.  Over the last 3 months for MCV of, therefore I am would check vitamin B12 and folic acid on today's draw and I have asked her to start taking oral folic acid 400 µg daily as well as start receiving vitamin B12 1000 µg subcutaneous every 4 weekly.  If the MCV remains high in another 3-4 months I may consider doing a bone marrow biopsy to diagnose myelodysplasia.  The last colonoscopy was done within the last 5 years and a verbal report given to me by the patient it was negative.    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 I am not sure if ACTHER has help improve her Hgb 11.1gm%today.  Any way per CMS guidelines, if the Hg 9.7 therefore Procrit today. WBC is up sec to ACTH being tried by [facility].  RTC 1 month.

## 2018-04-02 LAB
ALBUMIN SERPL-MCNC: 3.2 G/DL (ref 3.5–5.2)
ALP BLD-CCNC: 67 U/L (ref 35–104)
ALT SERPL-CCNC: 7 U/L (ref 5–33)
ANION GAP SERPL CALCULATED.3IONS-SCNC: 14 MMOL/L (ref 7–19)
AST SERPL-CCNC: 13 U/L (ref 5–32)
BACTERIA: NEGATIVE /HPF
BASOPHILS ABSOLUTE: 0 K/UL (ref 0–0.2)
BASOPHILS RELATIVE PERCENT: 0.2 % (ref 0–1)
BILIRUB SERPL-MCNC: 0.3 MG/DL (ref 0.2–1.2)
BILIRUBIN URINE: NEGATIVE
BLOOD, URINE: NEGATIVE
BUN BLDV-MCNC: 43 MG/DL (ref 8–23)
CALCIUM SERPL-MCNC: 9.1 MG/DL (ref 8.8–10.2)
CHLORIDE BLD-SCNC: 98 MMOL/L (ref 98–111)
CLARITY: CLEAR
CO2: 24 MMOL/L (ref 22–29)
COLOR: YELLOW
CREAT SERPL-MCNC: 2.9 MG/DL (ref 0.5–0.9)
CREATININE URINE: 45.4 MG/DL (ref 4.2–622)
EOSINOPHILS ABSOLUTE: 0.1 K/UL (ref 0–0.6)
EOSINOPHILS RELATIVE PERCENT: 1.3 % (ref 0–5)
EPITHELIAL CELLS, UA: 1 /HPF (ref 0–5)
GFR NON-AFRICAN AMERICAN: 16
GLUCOSE BLD-MCNC: 97 MG/DL (ref 74–109)
GLUCOSE URINE: 100 MG/DL
HCT VFR BLD CALC: 32.4 % (ref 37–47)
HEMOGLOBIN: 9.9 G/DL (ref 12–16)
HYALINE CASTS: 2 /HPF (ref 0–8)
KETONES, URINE: NEGATIVE MG/DL
LEUKOCYTE ESTERASE, URINE: NEGATIVE
LYMPHOCYTES ABSOLUTE: 2.3 K/UL (ref 1.1–4.5)
LYMPHOCYTES RELATIVE PERCENT: 23.3 % (ref 20–40)
MAGNESIUM: 1.6 MG/DL (ref 1.6–2.4)
MCH RBC QN AUTO: 30.1 PG (ref 27–31)
MCHC RBC AUTO-ENTMCNC: 30.6 G/DL (ref 33–37)
MCV RBC AUTO: 98.5 FL (ref 81–99)
MONOCYTES ABSOLUTE: 1.1 K/UL (ref 0–0.9)
MONOCYTES RELATIVE PERCENT: 11.3 % (ref 0–10)
NEUTROPHILS ABSOLUTE: 6.1 K/UL (ref 1.5–7.5)
NEUTROPHILS RELATIVE PERCENT: 63 % (ref 50–65)
NITRITE, URINE: NEGATIVE
PDW BLD-RTO: 15.6 % (ref 11.5–14.5)
PH UA: 6.5
PHOSPHORUS: 2.9 MG/DL (ref 2.5–4.5)
PLATELET # BLD: 271 K/UL (ref 130–400)
PMV BLD AUTO: 10.2 FL (ref 9.4–12.3)
POTASSIUM SERPL-SCNC: 4.2 MMOL/L (ref 3.5–5)
PROTEIN PROTEIN: 309 MG/DL (ref 15–45)
PROTEIN UA: 300 MG/DL
RBC # BLD: 3.29 M/UL (ref 4.2–5.4)
RBC UA: 2 /HPF (ref 0–4)
SODIUM BLD-SCNC: 136 MMOL/L (ref 136–145)
SPECIFIC GRAVITY UA: 1.01
TOTAL PROTEIN: 5.5 G/DL (ref 6.6–8.7)
UROBILINOGEN, URINE: 0.2 E.U./DL
WBC # BLD: 9.7 K/UL (ref 4.8–10.8)
WBC UA: 1 /HPF (ref 0–5)

## 2018-04-06 LAB
ALBUMIN SERPL-MCNC: 3 G/DL (ref 3.5–5.2)
ALP BLD-CCNC: 73 U/L (ref 35–104)
ALT SERPL-CCNC: 6 U/L (ref 5–33)
ANION GAP SERPL CALCULATED.3IONS-SCNC: 15 MMOL/L (ref 7–19)
AST SERPL-CCNC: 11 U/L (ref 5–32)
BASOPHILS ABSOLUTE: 0.1 K/UL (ref 0–0.2)
BASOPHILS RELATIVE PERCENT: 0.5 % (ref 0–1)
BILIRUB SERPL-MCNC: 0.4 MG/DL (ref 0.2–1.2)
BUN BLDV-MCNC: 41 MG/DL (ref 8–23)
CALCIUM SERPL-MCNC: 8.7 MG/DL (ref 8.8–10.2)
CHLORIDE BLD-SCNC: 101 MMOL/L (ref 98–111)
CO2: 22 MMOL/L (ref 22–29)
CREAT SERPL-MCNC: 3.1 MG/DL (ref 0.5–0.9)
EOSINOPHILS ABSOLUTE: 0.2 K/UL (ref 0–0.6)
EOSINOPHILS RELATIVE PERCENT: 1.8 % (ref 0–5)
FERRITIN: 140.4 NG/ML (ref 13–150)
FOLATE: 11.2 NG/ML (ref 4.8–37.3)
GFR NON-AFRICAN AMERICAN: 15
GLUCOSE BLD-MCNC: 98 MG/DL (ref 74–109)
HCT VFR BLD CALC: 32.7 % (ref 37–47)
HEMOGLOBIN: 9.7 G/DL (ref 12–16)
IRON: 27 UG/DL (ref 37–145)
LYMPHOCYTES ABSOLUTE: 1.9 K/UL (ref 1.1–4.5)
LYMPHOCYTES RELATIVE PERCENT: 20.7 % (ref 20–40)
MAGNESIUM: 1.8 MG/DL (ref 1.6–2.4)
MCH RBC QN AUTO: 29.5 PG (ref 27–31)
MCHC RBC AUTO-ENTMCNC: 29.7 G/DL (ref 33–37)
MCV RBC AUTO: 99.4 FL (ref 81–99)
MONOCYTES ABSOLUTE: 1.1 K/UL (ref 0–0.9)
MONOCYTES RELATIVE PERCENT: 11.4 % (ref 0–10)
NEUTROPHILS ABSOLUTE: 6.1 K/UL (ref 1.5–7.5)
NEUTROPHILS RELATIVE PERCENT: 65 % (ref 50–65)
PDW BLD-RTO: 16.2 % (ref 11.5–14.5)
PLATELET # BLD: 251 K/UL (ref 130–400)
PMV BLD AUTO: 9.8 FL (ref 9.4–12.3)
POTASSIUM SERPL-SCNC: 4.7 MMOL/L (ref 3.5–5)
PRO-BNP: 7991 PG/ML (ref 0–1800)
RBC # BLD: 3.29 M/UL (ref 4.2–5.4)
SODIUM BLD-SCNC: 138 MMOL/L (ref 136–145)
TOTAL IRON BINDING CAPACITY: 206 UG/DL (ref 250–400)
TOTAL PROTEIN: 5.8 G/DL (ref 6.6–8.7)
VITAMIN B-12: >2000 PG/ML (ref 211–946)
WBC # BLD: 9.4 K/UL (ref 4.8–10.8)

## 2018-04-09 LAB
ALBUMIN SERPL-MCNC: 2.8 G/DL (ref 3.5–5.2)
ALP BLD-CCNC: 70 U/L (ref 35–104)
ALT SERPL-CCNC: 5 U/L (ref 5–33)
ANION GAP SERPL CALCULATED.3IONS-SCNC: 15 MMOL/L (ref 7–19)
AST SERPL-CCNC: 10 U/L (ref 5–32)
BACTERIA: NEGATIVE /HPF
BASOPHILS ABSOLUTE: 0.1 K/UL (ref 0–0.2)
BASOPHILS RELATIVE PERCENT: 0.5 % (ref 0–1)
BILIRUB SERPL-MCNC: 0.3 MG/DL (ref 0.2–1.2)
BILIRUBIN URINE: NEGATIVE
BLOOD, URINE: NEGATIVE
BUN BLDV-MCNC: 44 MG/DL (ref 8–23)
CALCIUM SERPL-MCNC: 8.9 MG/DL (ref 8.8–10.2)
CHLORIDE BLD-SCNC: 98 MMOL/L (ref 98–111)
CLARITY: CLEAR
CO2: 22 MMOL/L (ref 22–29)
COLOR: YELLOW
CREAT SERPL-MCNC: 3.2 MG/DL (ref 0.5–0.9)
CREATININE URINE: 48.2 MG/DL (ref 4.2–622)
EOSINOPHILS ABSOLUTE: 0.2 K/UL (ref 0–0.6)
EOSINOPHILS RELATIVE PERCENT: 1.7 % (ref 0–5)
EPITHELIAL CELLS, UA: 1 /HPF (ref 0–5)
GFR NON-AFRICAN AMERICAN: 14
GLUCOSE BLD-MCNC: 103 MG/DL (ref 74–109)
GLUCOSE URINE: 100 MG/DL
HCT VFR BLD CALC: 32.3 % (ref 37–47)
HEMOGLOBIN: 9.6 G/DL (ref 12–16)
HYALINE CASTS: 0 /HPF (ref 0–8)
KETONES, URINE: NEGATIVE MG/DL
LEUKOCYTE ESTERASE, URINE: NEGATIVE
LYMPHOCYTES ABSOLUTE: 2 K/UL (ref 1.1–4.5)
LYMPHOCYTES RELATIVE PERCENT: 19.4 % (ref 20–40)
MAGNESIUM: 1.8 MG/DL (ref 1.6–2.4)
MCH RBC QN AUTO: 29.3 PG (ref 27–31)
MCHC RBC AUTO-ENTMCNC: 29.7 G/DL (ref 33–37)
MCV RBC AUTO: 98.5 FL (ref 81–99)
MONOCYTES ABSOLUTE: 1.1 K/UL (ref 0–0.9)
MONOCYTES RELATIVE PERCENT: 10.9 % (ref 0–10)
NEUTROPHILS ABSOLUTE: 6.8 K/UL (ref 1.5–7.5)
NEUTROPHILS RELATIVE PERCENT: 66.5 % (ref 50–65)
NITRITE, URINE: NEGATIVE
PDW BLD-RTO: 15.6 % (ref 11.5–14.5)
PH UA: 7
PHOSPHORUS: 3.6 MG/DL (ref 2.5–4.5)
PLATELET # BLD: 358 K/UL (ref 130–400)
PMV BLD AUTO: 10.1 FL (ref 9.4–12.3)
POTASSIUM SERPL-SCNC: 4.7 MMOL/L (ref 3.5–5)
PROTEIN PROTEIN: 273 MG/DL (ref 15–45)
PROTEIN UA: 300 MG/DL
RBC # BLD: 3.28 M/UL (ref 4.2–5.4)
RBC UA: 2 /HPF (ref 0–4)
SODIUM BLD-SCNC: 135 MMOL/L (ref 136–145)
SPECIFIC GRAVITY UA: 1.01
TOTAL PROTEIN: 6 G/DL (ref 6.6–8.7)
URIC ACID, SERUM: 7.2 MG/DL (ref 2.4–5.7)
UROBILINOGEN, URINE: 0.2 E.U./DL
WBC # BLD: 10.2 K/UL (ref 4.8–10.8)
WBC UA: 0 /HPF (ref 0–5)

## 2018-04-13 LAB
ALBUMIN SERPL-MCNC: 2.7 G/DL (ref 3.5–5.2)
ALP BLD-CCNC: 71 U/L (ref 35–104)
ALT SERPL-CCNC: 5 U/L (ref 5–33)
ANION GAP SERPL CALCULATED.3IONS-SCNC: 21 MMOL/L (ref 7–19)
AST SERPL-CCNC: 9 U/L (ref 5–32)
BACTERIA: NEGATIVE /HPF
BASOPHILS ABSOLUTE: 0.1 K/UL (ref 0–0.2)
BASOPHILS RELATIVE PERCENT: 0.6 % (ref 0–1)
BILIRUB SERPL-MCNC: <0.2 MG/DL (ref 0.2–1.2)
BILIRUBIN URINE: NEGATIVE
BLOOD, URINE: NEGATIVE
BUN BLDV-MCNC: 50 MG/DL (ref 8–23)
CALCIUM SERPL-MCNC: 8.4 MG/DL (ref 8.8–10.2)
CHLORIDE BLD-SCNC: 101 MMOL/L (ref 98–111)
CLARITY: CLEAR
CO2: 16 MMOL/L (ref 22–29)
COLOR: YELLOW
CREAT SERPL-MCNC: 3.4 MG/DL (ref 0.5–0.9)
CREATININE URINE: 38.2 MG/DL (ref 4.2–622)
EOSINOPHILS ABSOLUTE: 0.2 K/UL (ref 0–0.6)
EOSINOPHILS RELATIVE PERCENT: 2 % (ref 0–5)
EPITHELIAL CELLS, UA: 0 /HPF (ref 0–5)
GFR NON-AFRICAN AMERICAN: 13
GLUCOSE BLD-MCNC: 92 MG/DL (ref 74–109)
GLUCOSE URINE: NEGATIVE MG/DL
HCT VFR BLD CALC: 29.9 % (ref 37–47)
HEMOGLOBIN: 8.9 G/DL (ref 12–16)
HYALINE CASTS: 0 /HPF (ref 0–8)
KETONES, URINE: NEGATIVE MG/DL
LEUKOCYTE ESTERASE, URINE: NEGATIVE
LYMPHOCYTES ABSOLUTE: 2.4 K/UL (ref 1.1–4.5)
LYMPHOCYTES RELATIVE PERCENT: 26.6 % (ref 20–40)
MAGNESIUM: 1.9 MG/DL (ref 1.6–2.4)
MCH RBC QN AUTO: 29.4 PG (ref 27–31)
MCHC RBC AUTO-ENTMCNC: 29.8 G/DL (ref 33–37)
MCV RBC AUTO: 98.7 FL (ref 81–99)
MONOCYTES ABSOLUTE: 1 K/UL (ref 0–0.9)
MONOCYTES RELATIVE PERCENT: 11.1 % (ref 0–10)
NEUTROPHILS ABSOLUTE: 5.2 K/UL (ref 1.5–7.5)
NEUTROPHILS RELATIVE PERCENT: 57.9 % (ref 50–65)
NITRITE, URINE: NEGATIVE
PDW BLD-RTO: 15.6 % (ref 11.5–14.5)
PH UA: 6.5
PHOSPHORUS: 4 MG/DL (ref 2.5–4.5)
PLATELET # BLD: 550 K/UL (ref 130–400)
PMV BLD AUTO: 10.4 FL (ref 9.4–12.3)
POTASSIUM SERPL-SCNC: 5 MMOL/L (ref 3.5–5)
PROTEIN PROTEIN: 146 MG/DL (ref 15–45)
PROTEIN UA: 100 MG/DL
RBC # BLD: 3.03 M/UL (ref 4.2–5.4)
RBC UA: 2 /HPF (ref 0–4)
SODIUM BLD-SCNC: 138 MMOL/L (ref 136–145)
SPECIFIC GRAVITY UA: 1.01
TOTAL PROTEIN: 6 G/DL (ref 6.6–8.7)
URIC ACID, SERUM: 7.3 MG/DL (ref 2.4–5.7)
UROBILINOGEN, URINE: 0.2 E.U./DL
WBC # BLD: 9 K/UL (ref 4.8–10.8)
WBC UA: 0 /HPF (ref 0–5)

## 2018-04-16 LAB
ALBUMIN SERPL-MCNC: 2.8 G/DL (ref 3.5–5.2)
ALP BLD-CCNC: 70 U/L (ref 35–104)
ALT SERPL-CCNC: <5 U/L (ref 5–33)
ANION GAP SERPL CALCULATED.3IONS-SCNC: 15 MMOL/L (ref 7–19)
AST SERPL-CCNC: 9 U/L (ref 5–32)
BACTERIA: NEGATIVE /HPF
BASOPHILS ABSOLUTE: 0.1 K/UL (ref 0–0.2)
BASOPHILS RELATIVE PERCENT: 0.7 % (ref 0–1)
BILIRUB SERPL-MCNC: <0.2 MG/DL (ref 0.2–1.2)
BILIRUBIN URINE: NEGATIVE
BLOOD, URINE: NEGATIVE
BUN BLDV-MCNC: 50 MG/DL (ref 8–23)
CALCIUM SERPL-MCNC: 9.5 MG/DL (ref 8.8–10.2)
CHLORIDE BLD-SCNC: 106 MMOL/L (ref 98–111)
CLARITY: CLEAR
CO2: 17 MMOL/L (ref 22–29)
COLOR: YELLOW
CREAT SERPL-MCNC: 3.4 MG/DL (ref 0.5–0.9)
CREATININE URINE: 46.4 MG/DL (ref 4.2–622)
EOSINOPHILS ABSOLUTE: 0.2 K/UL (ref 0–0.6)
EOSINOPHILS RELATIVE PERCENT: 1.5 % (ref 0–5)
EPITHELIAL CELLS, UA: 0 /HPF (ref 0–5)
GFR NON-AFRICAN AMERICAN: 13
GLUCOSE BLD-MCNC: 104 MG/DL (ref 74–109)
GLUCOSE URINE: 100 MG/DL
HCT VFR BLD CALC: 31.9 % (ref 37–47)
HEMOGLOBIN: 9.2 G/DL (ref 12–16)
HYALINE CASTS: 1 /HPF (ref 0–8)
KETONES, URINE: NEGATIVE MG/DL
LEUKOCYTE ESTERASE, URINE: NEGATIVE
LYMPHOCYTES ABSOLUTE: 3.5 K/UL (ref 1.1–4.5)
LYMPHOCYTES RELATIVE PERCENT: 35 % (ref 20–40)
MAGNESIUM: 1.9 MG/DL (ref 1.6–2.4)
MCH RBC QN AUTO: 28.8 PG (ref 27–31)
MCHC RBC AUTO-ENTMCNC: 28.8 G/DL (ref 33–37)
MCV RBC AUTO: 100 FL (ref 81–99)
MONOCYTES ABSOLUTE: 1 K/UL (ref 0–0.9)
MONOCYTES RELATIVE PERCENT: 10 % (ref 0–10)
NEUTROPHILS ABSOLUTE: 4.9 K/UL (ref 1.5–7.5)
NEUTROPHILS RELATIVE PERCENT: 49.2 % (ref 50–65)
NITRITE, URINE: NEGATIVE
PDW BLD-RTO: 15.9 % (ref 11.5–14.5)
PH UA: 6
PHOSPHORUS: 3.8 MG/DL (ref 2.5–4.5)
PLATELET # BLD: 612 K/UL (ref 130–400)
PMV BLD AUTO: 9.7 FL (ref 9.4–12.3)
POTASSIUM SERPL-SCNC: 5.6 MMOL/L (ref 3.5–5)
PROTEIN PROTEIN: 197 MG/DL (ref 15–45)
PROTEIN UA: 300 MG/DL
RBC # BLD: 3.19 M/UL (ref 4.2–5.4)
RBC UA: 2 /HPF (ref 0–4)
SODIUM BLD-SCNC: 138 MMOL/L (ref 136–145)
SPECIFIC GRAVITY UA: 1.01
TOTAL PROTEIN: 6.1 G/DL (ref 6.6–8.7)
URIC ACID, SERUM: 7.2 MG/DL (ref 2.4–5.7)
UROBILINOGEN, URINE: 0.2 E.U./DL
WBC # BLD: 10.1 K/UL (ref 4.8–10.8)
WBC UA: 1 /HPF (ref 0–5)

## 2018-04-23 DIAGNOSIS — D63.1 ANEMIA OF CHRONIC RENAL FAILURE, STAGE 3 (MODERATE) (HCC): ICD-10-CM

## 2018-04-23 DIAGNOSIS — N18.30 ANEMIA OF CHRONIC RENAL FAILURE, STAGE 3 (MODERATE) (HCC): ICD-10-CM

## 2018-04-23 DIAGNOSIS — D63.1 ANEMIA OF CHRONIC KIDNEY FAILURE, STAGE 3 (MODERATE) (HCC): ICD-10-CM

## 2018-04-23 DIAGNOSIS — N18.30 ANEMIA OF CHRONIC KIDNEY FAILURE, STAGE 3 (MODERATE) (HCC): ICD-10-CM

## 2018-04-30 ENCOUNTER — TRANSCRIBE ORDERS (OUTPATIENT)
Dept: ONCOLOGY | Facility: HOSPITAL | Age: 78
End: 2018-04-30

## 2018-04-30 ENCOUNTER — LAB (OUTPATIENT)
Dept: LAB | Facility: HOSPITAL | Age: 78
End: 2018-04-30

## 2018-04-30 ENCOUNTER — OFFICE VISIT (OUTPATIENT)
Dept: ONCOLOGY | Facility: CLINIC | Age: 78
End: 2018-04-30

## 2018-04-30 ENCOUNTER — INFUSION (OUTPATIENT)
Dept: ONCOLOGY | Facility: HOSPITAL | Age: 78
End: 2018-04-30
Attending: INTERNAL MEDICINE

## 2018-04-30 VITALS
OXYGEN SATURATION: 95 % | RESPIRATION RATE: 18 BRPM | HEIGHT: 62 IN | BODY MASS INDEX: 24.79 KG/M2 | HEART RATE: 95 BPM | WEIGHT: 134.7 LBS | DIASTOLIC BLOOD PRESSURE: 70 MMHG | SYSTOLIC BLOOD PRESSURE: 124 MMHG | TEMPERATURE: 97.4 F

## 2018-04-30 VITALS
HEIGHT: 62 IN | DIASTOLIC BLOOD PRESSURE: 66 MMHG | RESPIRATION RATE: 20 BRPM | HEART RATE: 73 BPM | OXYGEN SATURATION: 100 % | WEIGHT: 134 LBS | SYSTOLIC BLOOD PRESSURE: 124 MMHG | BODY MASS INDEX: 24.66 KG/M2 | TEMPERATURE: 96.6 F

## 2018-04-30 DIAGNOSIS — D50.8 OTHER IRON DEFICIENCY ANEMIA: Primary | ICD-10-CM

## 2018-04-30 DIAGNOSIS — D63.1 ANEMIA OF CHRONIC RENAL FAILURE, STAGE 3 (MODERATE) (HCC): ICD-10-CM

## 2018-04-30 DIAGNOSIS — N18.30 ANEMIA OF CHRONIC KIDNEY FAILURE, STAGE 3 (MODERATE) (HCC): ICD-10-CM

## 2018-04-30 DIAGNOSIS — D63.1 ANEMIA OF CHRONIC RENAL FAILURE, STAGE 3 (MODERATE) (HCC): Primary | ICD-10-CM

## 2018-04-30 DIAGNOSIS — D63.1 ANEMIA OF CHRONIC KIDNEY FAILURE, STAGE 3 (MODERATE) (HCC): ICD-10-CM

## 2018-04-30 DIAGNOSIS — I13.10 BENIGN HYPERTENSIVE HEART AND RENAL DISEASE: Primary | ICD-10-CM

## 2018-04-30 DIAGNOSIS — N18.30 ANEMIA OF CHRONIC RENAL FAILURE, STAGE 3 (MODERATE) (HCC): ICD-10-CM

## 2018-04-30 DIAGNOSIS — N18.30 ANEMIA OF CHRONIC RENAL FAILURE, STAGE 3 (MODERATE) (HCC): Primary | ICD-10-CM

## 2018-04-30 DIAGNOSIS — N18.4 CHRONIC KIDNEY DISEASE, STAGE IV (SEVERE) (HCC): Primary | ICD-10-CM

## 2018-04-30 DIAGNOSIS — N18.4 CHRONIC KIDNEY DISEASE, STAGE IV (SEVERE) (HCC): ICD-10-CM

## 2018-04-30 LAB
ALBUMIN SERPL-MCNC: 3.1 G/DL (ref 3.5–5)
ALBUMIN/GLOB SERPL: 1 G/DL (ref 1.1–2.5)
ALP SERPL-CCNC: 65 U/L (ref 24–120)
ALT SERPL W P-5'-P-CCNC: 21 U/L (ref 0–54)
ANION GAP SERPL CALCULATED.3IONS-SCNC: 10 MMOL/L (ref 4–13)
AST SERPL-CCNC: 29 U/L (ref 7–45)
BASOPHILS # BLD AUTO: 0.03 10*3/MM3 (ref 0–0.2)
BASOPHILS NFR BLD AUTO: 0.4 % (ref 0–2)
BILIRUB SERPL-MCNC: 0.3 MG/DL (ref 0.1–1)
BUN BLD-MCNC: 42 MG/DL (ref 5–21)
BUN/CREAT SERPL: 14.9 (ref 7–25)
CALCIUM SPEC-SCNC: 8.5 MG/DL (ref 8.4–10.4)
CHLORIDE SERPL-SCNC: 102 MMOL/L (ref 98–110)
CO2 SERPL-SCNC: 17 MMOL/L (ref 24–31)
CREAT BLD-MCNC: 2.81 MG/DL (ref 0.5–1.4)
CREAT UR-MCNC: 50.2 MG/DL
DEPRECATED RDW RBC AUTO: 54.1 FL (ref 40–54)
EOSINOPHIL # BLD AUTO: 0.03 10*3/MM3 (ref 0–0.7)
EOSINOPHIL NFR BLD AUTO: 0.4 % (ref 0–4)
ERYTHROCYTE [DISTWIDTH] IN BLOOD BY AUTOMATED COUNT: 16 % (ref 12–15)
FERRITIN SERPL-MCNC: 123 NG/ML (ref 11.1–264)
GFR SERPL CREATININE-BSD FRML MDRD: 16 ML/MIN/1.73
GLOBULIN UR ELPH-MCNC: 3 GM/DL
GLUCOSE BLD-MCNC: 93 MG/DL (ref 70–100)
HCT VFR BLD AUTO: 24 % (ref 37–47)
HGB BLD-MCNC: 7.4 G/DL (ref 12–16)
HOLD SPECIMEN: NORMAL
HOLD SPECIMEN: NORMAL
IMM GRANULOCYTES # BLD: 0.04 10*3/MM3 (ref 0–0.03)
IMM GRANULOCYTES NFR BLD: 0.5 % (ref 0–5)
IRON 24H UR-MRATE: 136 MCG/DL (ref 42–180)
IRON SATN MFR SERPL: 48 % (ref 20–45)
LYMPHOCYTES # BLD AUTO: 2.23 10*3/MM3 (ref 0.72–4.86)
LYMPHOCYTES NFR BLD AUTO: 28.9 % (ref 15–45)
MCH RBC QN AUTO: 28.7 PG (ref 28–32)
MCHC RBC AUTO-ENTMCNC: 30.8 G/DL (ref 33–36)
MCV RBC AUTO: 93 FL (ref 82–98)
MONOCYTES # BLD AUTO: 0.82 10*3/MM3 (ref 0.19–1.3)
MONOCYTES NFR BLD AUTO: 10.6 % (ref 4–12)
NEUTROPHILS # BLD AUTO: 4.56 10*3/MM3 (ref 1.87–8.4)
NEUTROPHILS NFR BLD AUTO: 59.2 % (ref 39–78)
NRBC BLD MANUAL-RTO: 0 /100 WBC (ref 0–0)
PLATELET # BLD AUTO: 241 10*3/MM3 (ref 130–400)
PMV BLD AUTO: 10.1 FL (ref 6–12)
POTASSIUM BLD-SCNC: 3.2 MMOL/L (ref 3.5–5.3)
PROT SERPL-MCNC: 6.1 G/DL (ref 6.3–8.7)
RBC # BLD AUTO: 2.58 10*6/MM3 (ref 4.2–5.4)
SODIUM BLD-SCNC: 129 MMOL/L (ref 135–145)
TIBC SERPL-MCNC: 282 MCG/DL (ref 225–420)
WBC NRBC COR # BLD: 7.71 10*3/MM3 (ref 4.8–10.8)

## 2018-04-30 PROCEDURE — 25010000002 CYANOCOBALAMIN PER 1000 MCG: Performed by: INTERNAL MEDICINE

## 2018-04-30 PROCEDURE — 80053 COMPREHEN METABOLIC PANEL: CPT | Performed by: INTERNAL MEDICINE

## 2018-04-30 PROCEDURE — 63510000001 EPOETIN ALFA PER 1000 UNITS: Performed by: INTERNAL MEDICINE

## 2018-04-30 PROCEDURE — 96372 THER/PROPH/DIAG INJ SC/IM: CPT

## 2018-04-30 PROCEDURE — 36415 COLL VENOUS BLD VENIPUNCTURE: CPT

## 2018-04-30 PROCEDURE — 83550 IRON BINDING TEST: CPT | Performed by: INTERNAL MEDICINE

## 2018-04-30 PROCEDURE — 83540 ASSAY OF IRON: CPT | Performed by: INTERNAL MEDICINE

## 2018-04-30 PROCEDURE — 82570 ASSAY OF URINE CREATININE: CPT | Performed by: INTERNAL MEDICINE

## 2018-04-30 PROCEDURE — 99214 OFFICE O/P EST MOD 30 MIN: CPT | Performed by: INTERNAL MEDICINE

## 2018-04-30 PROCEDURE — 85025 COMPLETE CBC W/AUTO DIFF WBC: CPT | Performed by: INTERNAL MEDICINE

## 2018-04-30 PROCEDURE — 82728 ASSAY OF FERRITIN: CPT | Performed by: INTERNAL MEDICINE

## 2018-04-30 PROCEDURE — 82043 UR ALBUMIN QUANTITATIVE: CPT | Performed by: INTERNAL MEDICINE

## 2018-04-30 RX ORDER — CHLOROTHIAZIDE 250 MG/1
250 TABLET ORAL 2 TIMES DAILY
COMMUNITY
End: 2019-01-28

## 2018-04-30 RX ORDER — MYCOPHENOLATE MOFETIL 500 MG/1
TABLET ORAL 2 TIMES DAILY
COMMUNITY
End: 2019-01-28

## 2018-04-30 RX ORDER — CYANOCOBALAMIN 1000 UG/ML
1000 INJECTION, SOLUTION INTRAMUSCULAR; SUBCUTANEOUS ONCE
Status: CANCELLED
Start: 2018-04-30 | End: 2018-04-30

## 2018-04-30 RX ORDER — CYANOCOBALAMIN 1000 UG/ML
1000 INJECTION, SOLUTION INTRAMUSCULAR; SUBCUTANEOUS ONCE
Status: COMPLETED | OUTPATIENT
Start: 2018-04-30 | End: 2018-04-30

## 2018-04-30 RX ADMIN — ERYTHROPOIETIN 40000 UNITS: 40000 INJECTION, SOLUTION INTRAVENOUS; SUBCUTANEOUS at 12:17

## 2018-04-30 RX ADMIN — CYANOCOBALAMIN 1000 MCG: 1000 INJECTION, SOLUTION INTRAMUSCULAR at 12:17

## 2018-04-30 NOTE — PROGRESS NOTES
Patient ID:   Susanne Valentine is a 77 y.o. female.  Referring Physician:   García Wood MD  [address] [place] [address] [address]  [place] [address]  Primary Care Provider:  Saman Castorena DO    Assessment/Plan:  Assessment   Patient Active Problem List   Diagnosis   • Anemia of chronic kidney failure   • Anemia of chronic renal failure, stage 3 (moderate)   • B12 deficiency     Cancer Staging Information:  No matching staging information was found for the patient.    Susanne was seen today for follow-up.    Diagnoses and all orders for this visit:    Anemia of chronic renal failure, stage 3 (moderate)  -     Clinic Appointment Request    Anemia of chronic kidney failure, stage 3 (moderate)  -     Clinic Appointment Request      The patient has a stable anemia from her chronic kidney disease, with the use of Procrit once a month as necessary.  We will hold the treatment today, and have her come back in 1 month with a repeat CBC, we will proceed with Procrit at 40,000 units subcutaneously if her hemoglobin is less than 10 g/DL.  Patient ID:   Susanne Valentine is a 77 y.o. female.  Referring Physician:   García Wood MD  [facility] [address]  [place] [address]  Primary Care Provider:  Saman Castorena DO    Assessment/Plan:  Assessment   Patient Active Problem List   Diagnosis   • Anemia of chronic kidney failure   • Anemia of chronic renal failure, stage 3 (moderate)   • B12 deficiency     Cancer Staging Information:  No matching staging information was found for the patient.    Susanne was seen today for follow-up.    Diagnoses and all orders for this visit:    Anemia of chronic renal failure, stage 3 (moderate)  -     Clinic Appointment Request    Anemia of chronic kidney failure, stage 3 (moderate)  -     Clinic Appointment Request      The patient has a stable anemia from her chronic kidney disease, with the use of Procrit once a month as necessary.  We will hold the treatment  today, and have her come back in 1 month with a repeat CBC, we will proceed with Procrit at 40,000 units subcutaneously if her hemoglobin is less than 10 g/DL.     Interval History:  The patient is here for follow-up and for possible consideration for Procrit.  She has no new complaints at this time and has been doing well.  Apparently her   5 years ago, and she reminded me that I am seeing her in the past when I was still here.  She did not get any Procrit on her last visit    Interval Notes:  The following portions of the patient's history were reviewed and updated as appropriate: allergies, current medications, past family history, past medical history, past social history, past surgical history and problem list.     History of Present Illness   76-year-old white lady with chronic kidney disease has an anemia and has been on ESAs since . She has been tolerating it well and her anemia has been stable with a supplement.    Review of Systems   Constitutional: Negative.    HENT: Negative.    Eyes: Negative.    Respiratory: Negative.    Cardiovascular: Negative.    Gastrointestinal: Negative.    Endocrine: Negative.    Genitourinary: Negative.    Musculoskeletal: Negative.    Allergic/Immunologic: Negative.    Neurological: Negative.    Hematological: Negative.    Psychiatric/Behavioral: Negative.         Physical Exam:  Vital Signs for this encounter:  BSA: There is no height or weight on file to calculate BSA.  There were no vitals taken for this visit.    Physical Exam   Constitutional: She is oriented to person, place, and time. She appears well-developed and well-nourished. No distress.   HENT:   Head: Normocephalic and atraumatic.   Nose: Nose normal.   Mouth/Throat: Oropharynx is clear and moist. No oropharyngeal exudate.   Eyes: Conjunctivae and EOM are normal. Pupils are equal, round, and reactive to light. No scleral icterus.   Neck: Normal range of motion. Neck supple. No JVD present. No  thyromegaly present.   Cardiovascular: Normal rate, regular rhythm and normal heart sounds.  Exam reveals no gallop and no friction rub.    Pulmonary/Chest: Effort normal and breath sounds normal. No respiratory distress. She has no wheezes. She has no rales. She exhibits no tenderness.   Abdominal: Soft. Bowel sounds are normal. She exhibits no distension. There is no tenderness. There is no guarding.   Musculoskeletal: Normal range of motion. She exhibits no edema or tenderness.   Lymphadenopathy:     She has no cervical adenopathy.   Neurological: She is alert and oriented to person, place, and time. No cranial nerve deficit.   Skin: Skin is warm and dry.   Few purpuric spots, forearms   Psychiatric: She has a normal mood and affect. Her behavior is normal. Judgment and thought content normal.       Performance Status:  Asymptomatic    Results:  WBC   Date Value Ref Range Status   2018 11.45 (H) 4.80 - 10.80 10*3/mm3 Final     Hemoglobin   Date Value Ref Range Status   2018 9.6 (L) 12.0 - 16.0 g/dL Final     Hematocrit   Date Value Ref Range Status   2018 29.5 (L) 37.0 - 47.0 % Final     Platelets   Date Value Ref Range Status   2018 294 130 - 400 10*3/mm3 Final     Creatinine   Date Value Ref Range Status   2018 2.67 (H) 0.50 - 1.40 mg/dL Final     AST (SGOT)   Date Value Ref Range Status   2018 26 7 - 45 U/L Final        Interval History:  The patient is here for follow-up and for possible consideration for Procrit.  She has no new complaints at this time and has been doing well.  Apparently her   5 years ago, and she reminded me that I am seeing her in the past when I was still here.  She did not get any Procrit on her last visit    Interval Notes:  The following portions of the patient's history were reviewed and updated as appropriate: allergies, current medications, past family history, past medical history, past social history, past surgical history and  problem list.     History of Present Illness   76-year-old white lady with chronic kidney disease has an anemia and has been on ESAs since 2008. She has been tolerating it well and her anemia has been stable with a supplement.    Review of Systems   Constitutional: Negative.    HENT: Negative.    Eyes: Negative.    Respiratory: Negative.    Cardiovascular: Negative.    Gastrointestinal: Negative.    Endocrine: Negative.    Genitourinary: Negative.    Musculoskeletal: Negative.    Allergic/Immunologic: Negative.    Neurological: Negative.    Hematological: Negative.    Psychiatric/Behavioral: Negative.         Physical Exam:  Vital Signs for this encounter:  BSA: There is no height or weight on file to calculate BSA.  There were no vitals taken for this visit.    Physical Exam   Constitutional: She is oriented to person, place, and time. She appears well-developed and well-nourished. No distress.   HENT:   Head: Normocephalic and atraumatic.   Nose: Nose normal.   Mouth/Throat: Oropharynx is clear and moist. No oropharyngeal exudate.   Eyes: Conjunctivae and EOM are normal. Pupils are equal, round, and reactive to light. No scleral icterus.   Neck: Normal range of motion. Neck supple. No JVD present. No thyromegaly present.   Cardiovascular: Normal rate, regular rhythm and normal heart sounds.  Exam reveals no gallop and no friction rub.    Pulmonary/Chest: Effort normal and breath sounds normal. No respiratory distress. She has no wheezes. She has no rales. She exhibits no tenderness.   Abdominal: Soft. Bowel sounds are normal. She exhibits no distension. There is no tenderness. There is no guarding.   Musculoskeletal: Normal range of motion. She exhibits no edema or tenderness.   Lymphadenopathy:     She has no cervical adenopathy.   Neurological: She is alert and oriented to person, place, and time. No cranial nerve deficit.   Skin: Skin is warm and dry.   Few purpuric spots, forearms   Psychiatric: She has a  normal mood and affect. Her behavior is normal. Judgment and thought content normal.       Performance Status:  Asymptomatic    Results:  WBC   Date Value Ref Range Status   03/28/2018 11.45 (H) 4.80 - 10.80 10*3/mm3 Final     Hemoglobin   Date Value Ref Range Status   03/28/2018 9.6 (L) 12.0 - 16.0 g/dL Final     Hematocrit   Date Value Ref Range Status   03/28/2018 29.5 (L) 37.0 - 47.0 % Final     Platelets   Date Value Ref Range Status   03/28/2018 294 130 - 400 10*3/mm3 Final     Creatinine   Date Value Ref Range Status   03/28/2018 2.67 (H) 0.50 - 1.40 mg/dL Final     AST (SGOT)   Date Value Ref Range Status   03/28/2018 26 7 - 45 U/L Final     Anemia of chronic kidney disease.  Hemoglobin today is 10.4gm%,  continue Procrit.  Patient clinically asymptomatic.  Over the last 3 months for MCV of, therefore I am would check vitamin B12 and folic acid on today's draw and I have asked her to start taking oral folic acid 400 µg daily as well as start receiving vitamin B12 1000 µg subcutaneous every 4 weekly.  If the MCV remains high in another 3-4 months I may consider doing a bone marrow biopsy to diagnose myelodysplasia.  The last colonoscopy was done within the last 5 years and a verbal report given to me by the patient it was negative.    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 I am not sure if ACTHER has help improve her Hgb 11.1gm%today.  Any way per CMS guidelines, if the Hg 7.4 therefore Procrit today. Will draw Iron studies today. Clinically doing well, says she worked in the yard over the weekend. ACTH is stopped by [facility]. Recheck CBC Wed, if Iron is low will infuse Iron, however, if Hgb less then 8gm, will xfuse PRBC ( apparently she bled from Heammorhoids ).  RTC 1 month.

## 2018-05-01 LAB — MICROALBUMIN UR-MCNC: 1735.5 UG/ML

## 2018-05-02 ENCOUNTER — APPOINTMENT (OUTPATIENT)
Dept: LAB | Facility: HOSPITAL | Age: 78
End: 2018-05-02

## 2018-05-02 ENCOUNTER — OFFICE VISIT (OUTPATIENT)
Dept: ONCOLOGY | Facility: CLINIC | Age: 78
End: 2018-05-02

## 2018-05-02 VITALS
HEART RATE: 79 BPM | OXYGEN SATURATION: 96 % | DIASTOLIC BLOOD PRESSURE: 78 MMHG | BODY MASS INDEX: 24.11 KG/M2 | TEMPERATURE: 97.4 F | WEIGHT: 131 LBS | RESPIRATION RATE: 18 BRPM | SYSTOLIC BLOOD PRESSURE: 130 MMHG | HEIGHT: 62 IN

## 2018-05-02 DIAGNOSIS — N18.30 ANEMIA OF CHRONIC RENAL FAILURE, STAGE 3 (MODERATE) (HCC): Primary | ICD-10-CM

## 2018-05-02 DIAGNOSIS — D63.1 ANEMIA OF CHRONIC RENAL FAILURE, STAGE 3 (MODERATE) (HCC): Primary | ICD-10-CM

## 2018-05-02 LAB
ALBUMIN SERPL-MCNC: 3.2 G/DL (ref 3.5–5)
ALBUMIN/GLOB SERPL: 1.1 G/DL (ref 1.1–2.5)
ALP SERPL-CCNC: 68 U/L (ref 24–120)
ALT SERPL W P-5'-P-CCNC: 16 U/L (ref 0–54)
ANION GAP SERPL CALCULATED.3IONS-SCNC: 12 MMOL/L (ref 4–13)
AST SERPL-CCNC: 27 U/L (ref 7–45)
BASOPHILS # BLD AUTO: 0.03 10*3/MM3 (ref 0–0.2)
BASOPHILS NFR BLD AUTO: 0.5 % (ref 0–2)
BILIRUB SERPL-MCNC: 0.3 MG/DL (ref 0.1–1)
BUN BLD-MCNC: 49 MG/DL (ref 5–21)
BUN/CREAT SERPL: 17.7 (ref 7–25)
CALCIUM SPEC-SCNC: 9.3 MG/DL (ref 8.4–10.4)
CHLORIDE SERPL-SCNC: 103 MMOL/L (ref 98–110)
CO2 SERPL-SCNC: 17 MMOL/L (ref 24–31)
CREAT BLD-MCNC: 2.77 MG/DL (ref 0.5–1.4)
DEPRECATED RDW RBC AUTO: 54.2 FL (ref 40–54)
EOSINOPHIL # BLD AUTO: 0.06 10*3/MM3 (ref 0–0.7)
EOSINOPHIL NFR BLD AUTO: 0.9 % (ref 0–4)
ERYTHROCYTE [DISTWIDTH] IN BLOOD BY AUTOMATED COUNT: 16 % (ref 12–15)
GFR SERPL CREATININE-BSD FRML MDRD: 17 ML/MIN/1.73
GLOBULIN UR ELPH-MCNC: 2.8 GM/DL
GLUCOSE BLD-MCNC: 88 MG/DL (ref 70–100)
HCT VFR BLD AUTO: 23.9 % (ref 37–47)
HGB BLD-MCNC: 7.6 G/DL (ref 12–16)
HOLD SPECIMEN: NORMAL
HOLD SPECIMEN: NORMAL
IMM GRANULOCYTES # BLD: 0.06 10*3/MM3 (ref 0–0.03)
IMM GRANULOCYTES NFR BLD: 0.9 % (ref 0–5)
LYMPHOCYTES # BLD AUTO: 1.64 10*3/MM3 (ref 0.72–4.86)
LYMPHOCYTES NFR BLD AUTO: 25.2 % (ref 15–45)
MCH RBC QN AUTO: 29.5 PG (ref 28–32)
MCHC RBC AUTO-ENTMCNC: 31.8 G/DL (ref 33–36)
MCV RBC AUTO: 92.6 FL (ref 82–98)
MONOCYTES # BLD AUTO: 0.79 10*3/MM3 (ref 0.19–1.3)
MONOCYTES NFR BLD AUTO: 12.1 % (ref 4–12)
NEUTROPHILS # BLD AUTO: 3.94 10*3/MM3 (ref 1.87–8.4)
NEUTROPHILS NFR BLD AUTO: 60.4 % (ref 39–78)
NRBC BLD MANUAL-RTO: 0 /100 WBC (ref 0–0)
PLATELET # BLD AUTO: 238 10*3/MM3 (ref 130–400)
PMV BLD AUTO: 10.1 FL (ref 6–12)
POTASSIUM BLD-SCNC: 3 MMOL/L (ref 3.5–5.3)
PROT SERPL-MCNC: 6 G/DL (ref 6.3–8.7)
RBC # BLD AUTO: 2.58 10*6/MM3 (ref 4.2–5.4)
SODIUM BLD-SCNC: 132 MMOL/L (ref 135–145)
WBC NRBC COR # BLD: 6.52 10*3/MM3 (ref 4.8–10.8)

## 2018-05-02 PROCEDURE — 82270 OCCULT BLOOD FECES: CPT | Performed by: INTERNAL MEDICINE

## 2018-05-02 PROCEDURE — 85025 COMPLETE CBC W/AUTO DIFF WBC: CPT | Performed by: INTERNAL MEDICINE

## 2018-05-02 PROCEDURE — 80053 COMPREHEN METABOLIC PANEL: CPT | Performed by: INTERNAL MEDICINE

## 2018-05-02 PROCEDURE — 99214 OFFICE O/P EST MOD 30 MIN: CPT | Performed by: INTERNAL MEDICINE

## 2018-05-02 PROCEDURE — 36415 COLL VENOUS BLD VENIPUNCTURE: CPT

## 2018-05-02 NOTE — PROGRESS NOTES
Patient ID:   Susanne Valentine is a 77 y.o. female.  Referring Physician:   García Wood MD  [address] [facility] [address]  [place] [address]  Primary Care Provider:  Saman Castorena DO    Assessment/Plan:  Assessment   Patient Active Problem List   Diagnosis   • Anemia of chronic kidney failure   • Anemia of chronic renal failure, stage 3 (moderate)   • B12 deficiency     Cancer Staging Information:  No matching staging information was found for the patient.    There are no diagnoses linked to this encounter.  The patient has a stable anemia from her chronic kidney disease, with the use of Procrit once a month as necessary.  We will hold the treatment today, and have her come back in 1 month with a repeat CBC, we will proceed with Procrit at 40,000 units subcutaneously if her hemoglobin is less than 10 g/DL.  Patient ID:   Susanne Valentine is a 77 y.o. female.  Referring Physician:   García Wood MD  [address] [facility] [address]  [place] [address]  Primary Care Provider:  Saman Castorena DO    Assessment/Plan:  Assessment   Patient Active Problem List   Diagnosis   • Anemia of chronic kidney failure   • Anemia of chronic renal failure, stage 3 (moderate)   • B12 deficiency     Cancer Staging Information:  No matching staging information was found for the patient.    There are no diagnoses linked to this encounter.  The patient has a stable anemia from her chronic kidney disease, with the use of Procrit once a month as necessary.  We will hold the treatment today, and have her come back in 1 month with a repeat CBC, we will proceed with Procrit at 40,000 units subcutaneously if her hemoglobin is less than 10 g/DL.     Interval History:  The patient is here for follow-up and for possible consideration for Procrit.  She has no new complaints at this time and has been doing well.  Apparently her   5 years ago, and she reminded me that I am seeing her in the past when I  "was still here.  She did not get any Procrit on her last visit    Interval Notes:  The following portions of the patient's history were reviewed and updated as appropriate: allergies, current medications, past family history, past medical history, past social history, past surgical history and problem list.     History of Present Illness   76-year-old white lady with chronic kidney disease has an anemia and has been on ESAs since 2008. She has been tolerating it well and her anemia has been stable with a supplement.    Review of Systems   Constitutional: Negative.    HENT: Negative.    Eyes: Negative.    Respiratory: Negative.    Cardiovascular: Negative.    Gastrointestinal: Negative.    Endocrine: Negative.    Genitourinary: Negative.    Musculoskeletal: Negative.    Allergic/Immunologic: Negative.    Neurological: Negative.    Hematological: Negative.    Psychiatric/Behavioral: Negative.         Physical Exam:  Vital Signs for this encounter:  BSA: 1.6 meters squared  /78   Pulse 79   Temp 97.4 °F (36.3 °C) (Tympanic)   Resp 18   Ht 157.5 cm (62\")   Wt 59.4 kg (131 lb)   SpO2 96%   BMI 23.96 kg/m²     Physical Exam   Constitutional: She is oriented to person, place, and time. She appears well-developed and well-nourished. No distress.   HENT:   Head: Normocephalic and atraumatic.   Nose: Nose normal.   Mouth/Throat: Oropharynx is clear and moist. No oropharyngeal exudate.   Eyes: Conjunctivae and EOM are normal. Pupils are equal, round, and reactive to light. No scleral icterus.   Neck: Normal range of motion. Neck supple. No JVD present. No thyromegaly present.   Cardiovascular: Normal rate, regular rhythm and normal heart sounds.  Exam reveals no gallop and no friction rub.    Pulmonary/Chest: Effort normal and breath sounds normal. No respiratory distress. She has no wheezes. She has no rales. She exhibits no tenderness.   Abdominal: Soft. Bowel sounds are normal. She exhibits no distension. There " is no tenderness. There is no guarding.   Musculoskeletal: Normal range of motion. She exhibits no edema or tenderness.   Lymphadenopathy:     She has no cervical adenopathy.   Neurological: She is alert and oriented to person, place, and time. No cranial nerve deficit.   Skin: Skin is warm and dry.   Few purpuric spots, forearms   Psychiatric: She has a normal mood and affect. Her behavior is normal. Judgment and thought content normal.       Performance Status:  Asymptomatic    Results:  WBC   Date Value Ref Range Status   2018 6.52 4.80 - 10.80 10*3/mm3 Final     Hemoglobin   Date Value Ref Range Status   2018 7.6 (L) 12.0 - 16.0 g/dL Final     Hematocrit   Date Value Ref Range Status   2018 23.9 (L) 37.0 - 47.0 % Final     Platelets   Date Value Ref Range Status   2018 238 130 - 400 10*3/mm3 Final     Creatinine   Date Value Ref Range Status   2018 2.77 (H) 0.50 - 1.40 mg/dL Final     AST (SGOT)   Date Value Ref Range Status   2018 27 7 - 45 U/L Final        Interval History:  The patient is here for follow-up and for possible consideration for Procrit.  She has no new complaints at this time and has been doing well.  Apparently her   5 years ago, and she reminded me that I am seeing her in the past when I was still here.  She did not get any Procrit on her last visit    Interval Notes:  The following portions of the patient's history were reviewed and updated as appropriate: allergies, current medications, past family history, past medical history, past social history, past surgical history and problem list.     History of Present Illness   76-year-old white lady with chronic kidney disease has an anemia and has been on ESAs since . She has been tolerating it well and her anemia has been stable with a supplement.    Review of Systems   Constitutional: Negative.    HENT: Negative.    Eyes: Negative.    Respiratory: Negative.    Cardiovascular: Negative.   "  Gastrointestinal: Negative.    Endocrine: Negative.    Genitourinary: Negative.    Musculoskeletal: Negative.    Allergic/Immunologic: Negative.    Neurological: Negative.    Hematological: Negative.    Psychiatric/Behavioral: Negative.         Physical Exam:  Vital Signs for this encounter:  BSA: 1.6 meters squared  /78   Pulse 79   Temp 97.4 °F (36.3 °C) (Tympanic)   Resp 18   Ht 157.5 cm (62\")   Wt 59.4 kg (131 lb)   SpO2 96%   BMI 23.96 kg/m²     Physical Exam   Constitutional: She is oriented to person, place, and time. She appears well-developed and well-nourished. No distress.   HENT:   Head: Normocephalic and atraumatic.   Nose: Nose normal.   Mouth/Throat: Oropharynx is clear and moist. No oropharyngeal exudate.   Eyes: Conjunctivae and EOM are normal. Pupils are equal, round, and reactive to light. No scleral icterus.   Neck: Normal range of motion. Neck supple. No JVD present. No thyromegaly present.   Cardiovascular: Normal rate, regular rhythm and normal heart sounds.  Exam reveals no gallop and no friction rub.    Pulmonary/Chest: Effort normal and breath sounds normal. No respiratory distress. She has no wheezes. She has no rales. She exhibits no tenderness.   Abdominal: Soft. Bowel sounds are normal. She exhibits no distension. There is no tenderness. There is no guarding.   Musculoskeletal: Normal range of motion. She exhibits no edema or tenderness.   Lymphadenopathy:     She has no cervical adenopathy.   Neurological: She is alert and oriented to person, place, and time. No cranial nerve deficit.   Skin: Skin is warm and dry.   Few purpuric spots, forearms   Psychiatric: She has a normal mood and affect. Her behavior is normal. Judgment and thought content normal.       Performance Status:  Asymptomatic    Results:  WBC   Date Value Ref Range Status   05/02/2018 6.52 4.80 - 10.80 10*3/mm3 Final     Hemoglobin   Date Value Ref Range Status   05/02/2018 7.6 (L) 12.0 - 16.0 g/dL Final "     Hematocrit   Date Value Ref Range Status   05/02/2018 23.9 (L) 37.0 - 47.0 % Final     Platelets   Date Value Ref Range Status   05/02/2018 238 130 - 400 10*3/mm3 Final     Creatinine   Date Value Ref Range Status   05/02/2018 2.77 (H) 0.50 - 1.40 mg/dL Final     AST (SGOT)   Date Value Ref Range Status   05/02/2018 27 7 - 45 U/L Final     Anemia of chronic kidney disease.  Hemoglobin today is 10.4gm%,  continue Procrit.  Patient clinically asymptomatic.  Over the last 3 months for MCV of, therefore I am would check vitamin B12 and folic acid on today's draw and I have asked her to start taking oral folic acid 400 µg daily as well as start receiving vitamin B12 1000 µg subcutaneous every 4 weekly.  If the MCV remains high in another 3-4 months I may consider doing a bone marrow biopsy to diagnose myelodysplasia.  The last colonoscopy was done within the last 5 years and a verbal report given to me by the patient it was negative.    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 I am not sure if ACTHER has help improve her Hgb 11.1gm%today.  Any way per CMS guidelines, if the Hg 7.4 therefore Procrit today. Will draw Iron studies today. Clinically doing well, says she worked in the yard over the weekend. ACTH is stopped by Nephrology Svx. Recheck CBC Wed, if Iron is low will infuse Iron, however, if Hgb less then 8gm, will xfuse PRBC ( apparently she bled from Heammorhoids ).    Repeat Hgb today 7.6, however, pt clinically asymptomatic, therefore, will OBSERVE and recheck CBC again on Monday. Meanwhile check stools for OB.

## 2018-05-03 PROCEDURE — 82270 OCCULT BLOOD FECES: CPT | Performed by: INTERNAL MEDICINE

## 2018-05-04 PROCEDURE — 82270 OCCULT BLOOD FECES: CPT | Performed by: INTERNAL MEDICINE

## 2018-05-07 ENCOUNTER — TELEPHONE (OUTPATIENT)
Dept: ONCOLOGY | Facility: CLINIC | Age: 78
End: 2018-05-07

## 2018-05-07 ENCOUNTER — OFFICE VISIT (OUTPATIENT)
Dept: ONCOLOGY | Facility: CLINIC | Age: 78
End: 2018-05-07

## 2018-05-07 ENCOUNTER — APPOINTMENT (OUTPATIENT)
Dept: LAB | Facility: HOSPITAL | Age: 78
End: 2018-05-07

## 2018-05-07 VITALS
OXYGEN SATURATION: 90 % | WEIGHT: 130 LBS | SYSTOLIC BLOOD PRESSURE: 130 MMHG | DIASTOLIC BLOOD PRESSURE: 76 MMHG | TEMPERATURE: 97.5 F | HEIGHT: 62 IN | BODY MASS INDEX: 23.92 KG/M2 | RESPIRATION RATE: 16 BRPM | HEART RATE: 56 BPM

## 2018-05-07 DIAGNOSIS — I13.10 BENIGN HYPERTENSIVE HEART AND RENAL DISEASE: Primary | ICD-10-CM

## 2018-05-07 DIAGNOSIS — D64.9 ANEMIA, UNSPECIFIED TYPE: Primary | ICD-10-CM

## 2018-05-07 DIAGNOSIS — N18.30 ANEMIA OF CHRONIC RENAL FAILURE, STAGE 3 (MODERATE) (HCC): Primary | ICD-10-CM

## 2018-05-07 DIAGNOSIS — D63.1 ANEMIA OF CHRONIC RENAL FAILURE, STAGE 3 (MODERATE) (HCC): Primary | ICD-10-CM

## 2018-05-07 LAB
ALBUMIN SERPL-MCNC: 3.3 G/DL (ref 3.5–5)
ALBUMIN/GLOB SERPL: 1.2 G/DL (ref 1.1–2.5)
ALP SERPL-CCNC: 64 U/L (ref 24–120)
ALT SERPL W P-5'-P-CCNC: 15 U/L (ref 0–54)
ANION GAP SERPL CALCULATED.3IONS-SCNC: 11 MMOL/L (ref 4–13)
AST SERPL-CCNC: 16 U/L (ref 7–45)
BASOPHILS # BLD AUTO: 0.04 10*3/MM3 (ref 0–0.2)
BASOPHILS NFR BLD AUTO: 0.6 % (ref 0–2)
BILIRUB SERPL-MCNC: 0.3 MG/DL (ref 0.1–1)
BUN BLD-MCNC: 42 MG/DL (ref 5–21)
BUN/CREAT SERPL: 16.3 (ref 7–25)
CALCIUM SPEC-SCNC: 8.7 MG/DL (ref 8.4–10.4)
CHLORIDE SERPL-SCNC: 97 MMOL/L (ref 98–110)
CO2 SERPL-SCNC: 20 MMOL/L (ref 24–31)
CREAT BLD-MCNC: 2.57 MG/DL (ref 0.5–1.4)
DEPRECATED RDW RBC AUTO: 56.7 FL (ref 40–54)
EOSINOPHIL # BLD AUTO: 0.04 10*3/MM3 (ref 0–0.7)
EOSINOPHIL NFR BLD AUTO: 0.6 % (ref 0–4)
ERYTHROCYTE [DISTWIDTH] IN BLOOD BY AUTOMATED COUNT: 17.7 % (ref 12–15)
GFR SERPL CREATININE-BSD FRML MDRD: 18 ML/MIN/1.73
GLOBULIN UR ELPH-MCNC: 2.7 GM/DL
GLUCOSE BLD-MCNC: 105 MG/DL (ref 70–100)
HCT VFR BLD AUTO: 23.5 % (ref 37–47)
HEMOCCULT STL QL: NEGATIVE
HEMOCCULT STL QL: NEGATIVE
HEMOCCULT STL QL: POSITIVE
HGB BLD-MCNC: 7.4 G/DL (ref 12–16)
HOLD SPECIMEN: NORMAL
HOLD SPECIMEN: NORMAL
IMM GRANULOCYTES # BLD: 0.05 10*3/MM3 (ref 0–0.03)
IMM GRANULOCYTES NFR BLD: 0.8 % (ref 0–5)
LYMPHOCYTES # BLD AUTO: 1.55 10*3/MM3 (ref 0.72–4.86)
LYMPHOCYTES NFR BLD AUTO: 24.5 % (ref 15–45)
MCH RBC QN AUTO: 29.4 PG (ref 28–32)
MCHC RBC AUTO-ENTMCNC: 31.5 G/DL (ref 33–36)
MCV RBC AUTO: 93.3 FL (ref 82–98)
MONOCYTES # BLD AUTO: 0.82 10*3/MM3 (ref 0.19–1.3)
MONOCYTES NFR BLD AUTO: 13 % (ref 4–12)
NEUTROPHILS # BLD AUTO: 3.82 10*3/MM3 (ref 1.87–8.4)
NEUTROPHILS NFR BLD AUTO: 60.5 % (ref 39–78)
NRBC BLD MANUAL-RTO: 0 /100 WBC (ref 0–0)
PLATELET # BLD AUTO: 329 10*3/MM3 (ref 130–400)
PMV BLD AUTO: 10.2 FL (ref 6–12)
POTASSIUM BLD-SCNC: 2.7 MMOL/L (ref 3.5–5.3)
PROT SERPL-MCNC: 6 G/DL (ref 6.3–8.7)
RBC # BLD AUTO: 2.52 10*6/MM3 (ref 4.2–5.4)
SODIUM BLD-SCNC: 128 MMOL/L (ref 135–145)
WBC NRBC COR # BLD: 6.32 10*3/MM3 (ref 4.8–10.8)

## 2018-05-07 PROCEDURE — 99214 OFFICE O/P EST MOD 30 MIN: CPT | Performed by: INTERNAL MEDICINE

## 2018-05-07 PROCEDURE — 36415 COLL VENOUS BLD VENIPUNCTURE: CPT

## 2018-05-07 PROCEDURE — 85025 COMPLETE CBC W/AUTO DIFF WBC: CPT | Performed by: INTERNAL MEDICINE

## 2018-05-07 PROCEDURE — 80053 COMPREHEN METABOLIC PANEL: CPT | Performed by: INTERNAL MEDICINE

## 2018-05-07 NOTE — PROGRESS NOTES
Patient ID:   Susanne Valentine is a 77 y.o. female.  Referring Physician:   García Wood MD  [address] [place] [address] [address]  [place] [address]  Primary Care Provider:  Saman Castorena DO    Assessment/Plan:  Assessment   Patient Active Problem List   Diagnosis   • Anemia of chronic kidney failure   • Anemia of chronic renal failure, stage 3 (moderate)   • B12 deficiency     Cancer Staging Information:  No matching staging information was found for the patient.    Diagnoses and all orders for this visit:    Anemia, unspecified type  -     Occult Blood X 1, Stool - Stool, Per Rectum  -     Occult Blood X 1, Stool - Stool, Per Rectum  -     Occult Blood X 1, Stool - Stool, Per Rectum      The patient has a stable anemia from her chronic kidney disease, with the use of Procrit once a month as necessary.  We will hold the treatment today, and have her come back in 1 month with a repeat CBC, we will proceed with Procrit at 40,000 units subcutaneously if her hemoglobin is less than 10 g/DL.  Patient ID:   Susanne Valentine is a 77 y.o. female.  Referring Physician:   García Wood MD  [address] [place] [address] [address]  [place] [address]  Primary Care Provider:  Saman Castorena DO    Assessment/Plan:  Assessment   Patient Active Problem List   Diagnosis   • Anemia of chronic kidney failure   • Anemia of chronic renal failure, stage 3 (moderate)   • B12 deficiency     Cancer Staging Information:  No matching staging information was found for the patient.    Diagnoses and all orders for this visit:    Anemia, unspecified type  -     Occult Blood X 1, Stool - Stool, Per Rectum  -     Occult Blood X 1, Stool - Stool, Per Rectum  -     Occult Blood X 1, Stool - Stool, Per Rectum      The patient has a stable anemia from her chronic kidney disease, with the use of Procrit once a month as necessary.  We will hold the treatment today, and have her come back in 1 month with a repeat CBC, we will proceed  "with Procrit at 40,000 units subcutaneously if her hemoglobin is less than 10 g/DL.     Interval History:  The patient is here for follow-up and for possible consideration for Procrit.  She has no new complaints at this time and has been doing well.  Apparently her   5 years ago, and she reminded me that I am seeing her in the past when I was still here.  She did not get any Procrit on her last visit    Interval Notes:  The following portions of the patient's history were reviewed and updated as appropriate: allergies, current medications, past family history, past medical history, past social history, past surgical history and problem list.     History of Present Illness   76-year-old white lady with chronic kidney disease has an anemia and has been on ESAs since . She has been tolerating it well and her anemia has been stable with a supplement.    Review of Systems   Constitutional: Negative.    HENT: Negative.    Eyes: Negative.    Respiratory: Negative.    Cardiovascular: Negative.    Gastrointestinal: Negative.    Endocrine: Negative.    Genitourinary: Negative.    Musculoskeletal: Negative.    Allergic/Immunologic: Negative.    Neurological: Negative.    Hematological: Negative.    Psychiatric/Behavioral: Negative.         Physical Exam:  Vital Signs for this encounter:  BSA: 1.59 meters squared  /76   Pulse 56   Temp 97.5 °F (36.4 °C) (Tympanic)   Resp 16   Ht 157.5 cm (62.01\")   Wt 59 kg (130 lb)   SpO2 90%   BMI 23.77 kg/m²     Physical Exam   Constitutional: She is oriented to person, place, and time. She appears well-developed and well-nourished. No distress.   HENT:   Head: Normocephalic and atraumatic.   Nose: Nose normal.   Mouth/Throat: Oropharynx is clear and moist. No oropharyngeal exudate.   Eyes: Conjunctivae and EOM are normal. Pupils are equal, round, and reactive to light. No scleral icterus.   Neck: Normal range of motion. Neck supple. No JVD present. No thyromegaly " present.   Cardiovascular: Normal rate, regular rhythm and normal heart sounds.  Exam reveals no gallop and no friction rub.    Pulmonary/Chest: Effort normal and breath sounds normal. No respiratory distress. She has no wheezes. She has no rales. She exhibits no tenderness.   Abdominal: Soft. Bowel sounds are normal. She exhibits no distension. There is no tenderness. There is no guarding.   Musculoskeletal: Normal range of motion. She exhibits no edema or tenderness.   Lymphadenopathy:     She has no cervical adenopathy.   Neurological: She is alert and oriented to person, place, and time. No cranial nerve deficit.   Skin: Skin is warm and dry.   Few purpuric spots, forearms   Psychiatric: She has a normal mood and affect. Her behavior is normal. Judgment and thought content normal.       Performance Status:  Asymptomatic    Results:  WBC   Date Value Ref Range Status   2018 6.32 4.80 - 10.80 10*3/mm3 Final     Hemoglobin   Date Value Ref Range Status   2018 7.4 (L) 12.0 - 16.0 g/dL Final     Hematocrit   Date Value Ref Range Status   2018 23.5 (L) 37.0 - 47.0 % Final     Platelets   Date Value Ref Range Status   2018 329 130 - 400 10*3/mm3 Final     Creatinine   Date Value Ref Range Status   2018 2.57 (H) 0.50 - 1.40 mg/dL Final     AST (SGOT)   Date Value Ref Range Status   2018 16 7 - 45 U/L Final        Interval History:  The patient is here for follow-up and for possible consideration for Procrit.  She has no new complaints at this time and has been doing well.  Apparently her   5 years ago, and she reminded me that I am seeing her in the past when I was still here.  She did not get any Procrit on her last visit    Interval Notes:  The following portions of the patient's history were reviewed and updated as appropriate: allergies, current medications, past family history, past medical history, past social history, past surgical history and problem list.  "    History of Present Illness   76-year-old white lady with chronic kidney disease has an anemia and has been on ESAs since 2008. She has been tolerating it well and her anemia has been stable with a supplement.    Review of Systems   Constitutional: Negative.    HENT: Negative.    Eyes: Negative.    Respiratory: Negative.    Cardiovascular: Negative.    Gastrointestinal: Negative.    Endocrine: Negative.    Genitourinary: Negative.    Musculoskeletal: Negative.    Allergic/Immunologic: Negative.    Neurological: Negative.    Hematological: Negative.    Psychiatric/Behavioral: Negative.         Physical Exam:  Vital Signs for this encounter:  BSA: 1.59 meters squared  /76   Pulse 56   Temp 97.5 °F (36.4 °C) (Tympanic)   Resp 16   Ht 157.5 cm (62.01\")   Wt 59 kg (130 lb)   SpO2 90%   BMI 23.77 kg/m²     Physical Exam   Constitutional: She is oriented to person, place, and time. She appears well-developed and well-nourished. No distress.   HENT:   Head: Normocephalic and atraumatic.   Nose: Nose normal.   Mouth/Throat: Oropharynx is clear and moist. No oropharyngeal exudate.   Eyes: Conjunctivae and EOM are normal. Pupils are equal, round, and reactive to light. No scleral icterus.   Neck: Normal range of motion. Neck supple. No JVD present. No thyromegaly present.   Cardiovascular: Normal rate, regular rhythm and normal heart sounds.  Exam reveals no gallop and no friction rub.    Pulmonary/Chest: Effort normal and breath sounds normal. No respiratory distress. She has no wheezes. She has no rales. She exhibits no tenderness.   Abdominal: Soft. Bowel sounds are normal. She exhibits no distension. There is no tenderness. There is no guarding.   Musculoskeletal: Normal range of motion. She exhibits no edema or tenderness.   Lymphadenopathy:     She has no cervical adenopathy.   Neurological: She is alert and oriented to person, place, and time. No cranial nerve deficit.   Skin: Skin is warm and dry.   Few " purpuric spots, forearms   Psychiatric: She has a normal mood and affect. Her behavior is normal. Judgment and thought content normal.       Performance Status:  Asymptomatic    Results:  WBC   Date Value Ref Range Status   05/07/2018 6.32 4.80 - 10.80 10*3/mm3 Final     Hemoglobin   Date Value Ref Range Status   05/07/2018 7.4 (L) 12.0 - 16.0 g/dL Final     Hematocrit   Date Value Ref Range Status   05/07/2018 23.5 (L) 37.0 - 47.0 % Final     Platelets   Date Value Ref Range Status   05/07/2018 329 130 - 400 10*3/mm3 Final     Creatinine   Date Value Ref Range Status   05/07/2018 2.57 (H) 0.50 - 1.40 mg/dL Final     AST (SGOT)   Date Value Ref Range Status   05/07/2018 16 7 - 45 U/L Final     Anemia of chronic kidney disease.  Hemoglobin today is 10.4gm%,  continue Procrit.  Patient clinically asymptomatic.  Over the last 3 months for MCV of, therefore I am would check vitamin B12 and folic acid on today's draw and I have asked her to start taking oral folic acid 400 µg daily as well as start receiving vitamin B12 1000 µg subcutaneous every 4 weekly.  If the MCV remains high in another 3-4 months I may consider doing a bone marrow biopsy to diagnose myelodysplasia.  The last colonoscopy was done within the last 5 years and a verbal report given to me by the patient it was negative.    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 I am not sure if ACTHER has help improve her Hgb 11.1gm%today.  Any way per CMS guidelines, if the Hg 7.4 therefore Procrit today. Will draw Iron studies today. Clinically doing well, says she worked in the yard over the weekend. ACTH is stopped by Nephrology Svx. Recheck CBC Wed, if Iron is low will infuse Iron, however, if Hgb less then 8gm, will xfuse PRBC ( apparently she bled from Heammorhoids ).    Repeat Hgb today 7.6, however, pt clinically asymptomatic, therefore, will  OBSERVE and recheck CBC again on Monday. Meanwhile check stools for Ob.  Toady Hgb still low at 7.4gm%, however pt is doing fine clinically asymptomatic. I believe she is dilutional from Volume overload 3rd space fluid from ACHTAR that was Rx and stopped last month.  Since she is asymptomatic, will OBSERVE and redo her CBC next week. However, I have advised her that my cuit off for transfusion is Hgb 7gm%.

## 2018-05-07 NOTE — TELEPHONE ENCOUNTER
Received call from [facility], he called with Critical Reading of K+:  K+: 2.7  Information was given to Dr Wood to address with patient and her daughter.

## 2018-05-14 ENCOUNTER — OFFICE VISIT (OUTPATIENT)
Dept: ONCOLOGY | Facility: CLINIC | Age: 78
End: 2018-05-14

## 2018-05-14 ENCOUNTER — APPOINTMENT (OUTPATIENT)
Dept: LAB | Facility: HOSPITAL | Age: 78
End: 2018-05-14

## 2018-05-14 VITALS
HEART RATE: 56 BPM | WEIGHT: 128.1 LBS | DIASTOLIC BLOOD PRESSURE: 84 MMHG | SYSTOLIC BLOOD PRESSURE: 148 MMHG | RESPIRATION RATE: 16 BRPM | TEMPERATURE: 96.7 F | HEIGHT: 62 IN | OXYGEN SATURATION: 94 % | BODY MASS INDEX: 23.57 KG/M2

## 2018-05-14 DIAGNOSIS — N18.30 ANEMIA OF CHRONIC KIDNEY FAILURE, STAGE 3 (MODERATE) (HCC): ICD-10-CM

## 2018-05-14 DIAGNOSIS — I13.10 BENIGN HYPERTENSIVE HEART AND RENAL DISEASE: Primary | ICD-10-CM

## 2018-05-14 DIAGNOSIS — D63.1 ANEMIA OF CHRONIC KIDNEY FAILURE, STAGE 3 (MODERATE) (HCC): ICD-10-CM

## 2018-05-14 DIAGNOSIS — D63.1 ANEMIA OF CHRONIC RENAL FAILURE, STAGE 3 (MODERATE) (HCC): ICD-10-CM

## 2018-05-14 DIAGNOSIS — N18.30 ANEMIA OF CHRONIC RENAL FAILURE, STAGE 3 (MODERATE) (HCC): ICD-10-CM

## 2018-05-14 LAB
ALBUMIN SERPL-MCNC: 3.4 G/DL (ref 3.5–5)
ALBUMIN/GLOB SERPL: 1.3 G/DL (ref 1.1–2.5)
ALP SERPL-CCNC: 59 U/L (ref 24–120)
ALT SERPL W P-5'-P-CCNC: <15 U/L (ref 0–54)
ANION GAP SERPL CALCULATED.3IONS-SCNC: 11 MMOL/L (ref 4–13)
AST SERPL-CCNC: 18 U/L (ref 7–45)
BASOPHILS # BLD AUTO: 0.03 10*3/MM3 (ref 0–0.2)
BASOPHILS NFR BLD AUTO: 0.5 % (ref 0–2)
BILIRUB SERPL-MCNC: 0.3 MG/DL (ref 0.1–1)
BUN BLD-MCNC: 44 MG/DL (ref 5–21)
BUN/CREAT SERPL: 15.5 (ref 7–25)
CALCIUM SPEC-SCNC: 8.4 MG/DL (ref 8.4–10.4)
CHLORIDE SERPL-SCNC: 101 MMOL/L (ref 98–110)
CO2 SERPL-SCNC: 19 MMOL/L (ref 24–31)
CREAT BLD-MCNC: 2.84 MG/DL (ref 0.5–1.4)
DEPRECATED RDW RBC AUTO: 62.3 FL (ref 40–54)
EOSINOPHIL # BLD AUTO: 0.01 10*3/MM3 (ref 0–0.7)
EOSINOPHIL NFR BLD AUTO: 0.2 % (ref 0–4)
ERYTHROCYTE [DISTWIDTH] IN BLOOD BY AUTOMATED COUNT: 17.8 % (ref 12–15)
GFR SERPL CREATININE-BSD FRML MDRD: 16 ML/MIN/1.73
GLOBULIN UR ELPH-MCNC: 2.6 GM/DL
GLUCOSE BLD-MCNC: 98 MG/DL (ref 70–100)
HCT VFR BLD AUTO: 22.7 % (ref 37–47)
HGB BLD-MCNC: 7.2 G/DL (ref 12–16)
HOLD SPECIMEN: NORMAL
IMM GRANULOCYTES # BLD: 0.02 10*3/MM3 (ref 0–0.03)
IMM GRANULOCYTES NFR BLD: 0.3 % (ref 0–5)
LYMPHOCYTES # BLD AUTO: 1.59 10*3/MM3 (ref 0.72–4.86)
LYMPHOCYTES NFR BLD AUTO: 26.6 % (ref 15–45)
MCH RBC QN AUTO: 30.3 PG (ref 28–32)
MCHC RBC AUTO-ENTMCNC: 31.7 G/DL (ref 33–36)
MCV RBC AUTO: 95.4 FL (ref 82–98)
MONOCYTES # BLD AUTO: 0.69 10*3/MM3 (ref 0.19–1.3)
MONOCYTES NFR BLD AUTO: 11.5 % (ref 4–12)
NEUTROPHILS # BLD AUTO: 3.64 10*3/MM3 (ref 1.87–8.4)
NEUTROPHILS NFR BLD AUTO: 60.9 % (ref 39–78)
NRBC BLD MANUAL-RTO: 0 /100 WBC (ref 0–0)
PLATELET # BLD AUTO: 282 10*3/MM3 (ref 130–400)
PMV BLD AUTO: 9.7 FL (ref 6–12)
POTASSIUM BLD-SCNC: 3.6 MMOL/L (ref 3.5–5.3)
PROT SERPL-MCNC: 6 G/DL (ref 6.3–8.7)
RBC # BLD AUTO: 2.38 10*6/MM3 (ref 4.2–5.4)
SODIUM BLD-SCNC: 131 MMOL/L (ref 135–145)
WBC NRBC COR # BLD: 5.98 10*3/MM3 (ref 4.8–10.8)

## 2018-05-14 PROCEDURE — 80053 COMPREHEN METABOLIC PANEL: CPT | Performed by: INTERNAL MEDICINE

## 2018-05-14 PROCEDURE — 85025 COMPLETE CBC W/AUTO DIFF WBC: CPT | Performed by: INTERNAL MEDICINE

## 2018-05-14 PROCEDURE — 36415 COLL VENOUS BLD VENIPUNCTURE: CPT

## 2018-05-14 PROCEDURE — 99214 OFFICE O/P EST MOD 30 MIN: CPT | Performed by: INTERNAL MEDICINE

## 2018-05-14 NOTE — PROGRESS NOTES
Patient ID:   Susanne Valentine is a 77 y.o. female.  Referring Physician:   García Wood MD  [address] [facility] [address]  [place] [address]  Primary Care Provider:  Saman Castorena DO    Assessment/Plan:  Assessment   Patient Active Problem List   Diagnosis   • Anemia of chronic kidney failure   • Anemia of chronic renal failure, stage 3 (moderate)   • B12 deficiency     Cancer Staging Information:  No matching staging information was found for the patient.    Diagnoses and all orders for this visit:    Anemia of chronic renal failure, stage 3 (moderate)  -     CBC and Differential; Future  -     Lab Appointment Request; Future  -     STAT Comprehensive Metabolic Panel; Future  -     Clinic Appointment Request; Future  -     ONCBCN INFUSION APPOINTMENT REQUEST 01; Future    Anemia of chronic kidney failure, stage 3 (moderate)  -     CBC and Differential; Future  -     Lab Appointment Request; Future  -     STAT Comprehensive Metabolic Panel; Future  -     Clinic Appointment Request; Future  -     ONCBCN INFUSION APPOINTMENT REQUEST 01; Future      The patient has a stable anemia from her chronic kidney disease, with the use of Procrit once a month as necessary.  We will hold the treatment today, and have her come back in 1 month with a repeat CBC, we will proceed with Procrit at 40,000 units subcutaneously if her hemoglobin is less than 10 g/DL.  Patient ID:   Susanne Valentine is a 77 y.o. female.  Referring Physician:   García Wood MD  [address] [facility] [address]  [place] [address]  Primary Care Provider:  Saman Castorena DO    Assessment/Plan:  Assessment   Patient Active Problem List   Diagnosis   • Anemia of chronic kidney failure   • Anemia of chronic renal failure, stage 3 (moderate)   • B12 deficiency     Cancer Staging Information:  No matching staging information was found for the patient.    Diagnoses and all orders for this visit:    Anemia of chronic renal  failure, stage 3 (moderate)  -     CBC and Differential; Future  -     Lab Appointment Request; Future  -     STAT Comprehensive Metabolic Panel; Future  -     Clinic Appointment Request; Future  -     ONCBCN INFUSION APPOINTMENT REQUEST ; Future    Anemia of chronic kidney failure, stage 3 (moderate)  -     CBC and Differential; Future  -     Lab Appointment Request; Future  -     STAT Comprehensive Metabolic Panel; Future  -     Clinic Appointment Request; Future  -     ONCBCN INFUSION APPOINTMENT REQUEST 01; Future      The patient has a stable anemia from her chronic kidney disease, with the use of Procrit once a month as necessary.  We will hold the treatment today, and have her come back in 1 month with a repeat CBC, we will proceed with Procrit at 40,000 units subcutaneously if her hemoglobin is less than 10 g/DL.     Interval History:  The patient is here for follow-up and for possible consideration for Procrit.  She has no new complaints at this time and has been doing well.  Apparently her   5 years ago, and she reminded me that I am seeing her in the past when I was still here.  She did not get any Procrit on her last visit    Interval Notes:  The following portions of the patient's history were reviewed and updated as appropriate: allergies, current medications, past family history, past medical history, past social history, past surgical history and problem list.     History of Present Illness   76-year-old white lady with chronic kidney disease has an anemia and has been on ESAs since . She has been tolerating it well and her anemia has been stable with a supplement.    Review of Systems   Constitutional: Negative.    HENT: Negative.    Eyes: Negative.    Respiratory: Negative.    Cardiovascular: Negative.    Gastrointestinal: Negative.    Endocrine: Negative.    Genitourinary: Negative.    Musculoskeletal: Negative.    Allergic/Immunologic: Negative.    Neurological: Negative.   "  Hematological: Negative.    Psychiatric/Behavioral: Negative.         Physical Exam:  Vital Signs for this encounter:  BSA: 1.58 meters squared  /84   Pulse 56   Temp 96.7 °F (35.9 °C) (Tympanic)   Resp 16   Ht 157.5 cm (62.01\")   Wt 58.1 kg (128 lb 1.6 oz)   SpO2 94%   BMI 23.42 kg/m²     Physical Exam   Constitutional: She is oriented to person, place, and time. She appears well-developed and well-nourished. No distress.   HENT:   Head: Normocephalic and atraumatic.   Nose: Nose normal.   Mouth/Throat: Oropharynx is clear and moist. No oropharyngeal exudate.   Eyes: Conjunctivae and EOM are normal. Pupils are equal, round, and reactive to light. No scleral icterus.   Neck: Normal range of motion. Neck supple. No JVD present. No thyromegaly present.   Cardiovascular: Normal rate, regular rhythm and normal heart sounds.  Exam reveals no gallop and no friction rub.    Pulmonary/Chest: Effort normal and breath sounds normal. No respiratory distress. She has no wheezes. She has no rales. She exhibits no tenderness.   Abdominal: Soft. Bowel sounds are normal. She exhibits no distension. There is no tenderness. There is no guarding.   Musculoskeletal: Normal range of motion. She exhibits no edema or tenderness.   Lymphadenopathy:     She has no cervical adenopathy.   Neurological: She is alert and oriented to person, place, and time. No cranial nerve deficit.   Skin: Skin is warm and dry.   Few purpuric spots, forearms   Psychiatric: She has a normal mood and affect. Her behavior is normal. Judgment and thought content normal.       Performance Status:  Asymptomatic    Results:  WBC   Date Value Ref Range Status   05/14/2018 5.98 4.80 - 10.80 10*3/mm3 Final     Hemoglobin   Date Value Ref Range Status   05/14/2018 7.2 (L) 12.0 - 16.0 g/dL Final     Hematocrit   Date Value Ref Range Status   05/14/2018 22.7 (L) 37.0 - 47.0 % Final     Platelets   Date Value Ref Range Status   05/14/2018 282 130 - 400 " "10*3/mm3 Final     Creatinine   Date Value Ref Range Status   2018 2.84 (H) 0.50 - 1.40 mg/dL Final     AST (SGOT)   Date Value Ref Range Status   2018 18 7 - 45 U/L Final        Interval History:  The patient is here for follow-up and for possible consideration for Procrit.  She has no new complaints at this time and has been doing well.  Apparently her   5 years ago, and she reminded me that I am seeing her in the past when I was still here.  She did not get any Procrit on her last visit    Interval Notes:  The following portions of the patient's history were reviewed and updated as appropriate: allergies, current medications, past family history, past medical history, past social history, past surgical history and problem list.     History of Present Illness   76-year-old white lady with chronic kidney disease has an anemia and has been on ESAs since . She has been tolerating it well and her anemia has been stable with a supplement.    Review of Systems   Constitutional: Negative.    HENT: Negative.    Eyes: Negative.    Respiratory: Negative.    Cardiovascular: Negative.    Gastrointestinal: Negative.    Endocrine: Negative.    Genitourinary: Negative.    Musculoskeletal: Negative.    Allergic/Immunologic: Negative.    Neurological: Negative.    Hematological: Negative.    Psychiatric/Behavioral: Negative.         Physical Exam:  Vital Signs for this encounter:  BSA: 1.58 meters squared  /84   Pulse 56   Temp 96.7 °F (35.9 °C) (Tympanic)   Resp 16   Ht 157.5 cm (62.01\")   Wt 58.1 kg (128 lb 1.6 oz)   SpO2 94%   BMI 23.42 kg/m²     Physical Exam   Constitutional: She is oriented to person, place, and time. She appears well-developed and well-nourished. No distress.   HENT:   Head: Normocephalic and atraumatic.   Nose: Nose normal.   Mouth/Throat: Oropharynx is clear and moist. No oropharyngeal exudate.   Eyes: Conjunctivae and EOM are normal. Pupils are equal, round, and " reactive to light. No scleral icterus.   Neck: Normal range of motion. Neck supple. No JVD present. No thyromegaly present.   Cardiovascular: Normal rate, regular rhythm and normal heart sounds.  Exam reveals no gallop and no friction rub.    Pulmonary/Chest: Effort normal and breath sounds normal. No respiratory distress. She has no wheezes. She has no rales. She exhibits no tenderness.   Abdominal: Soft. Bowel sounds are normal. She exhibits no distension. There is no tenderness. There is no guarding.   Musculoskeletal: Normal range of motion. She exhibits no edema or tenderness.   Lymphadenopathy:     She has no cervical adenopathy.   Neurological: She is alert and oriented to person, place, and time. No cranial nerve deficit.   Skin: Skin is warm and dry.   Few purpuric spots, forearms   Psychiatric: She has a normal mood and affect. Her behavior is normal. Judgment and thought content normal.       Performance Status:  Asymptomatic    Results:  WBC   Date Value Ref Range Status   05/14/2018 5.98 4.80 - 10.80 10*3/mm3 Final     Hemoglobin   Date Value Ref Range Status   05/14/2018 7.2 (L) 12.0 - 16.0 g/dL Final     Hematocrit   Date Value Ref Range Status   05/14/2018 22.7 (L) 37.0 - 47.0 % Final     Platelets   Date Value Ref Range Status   05/14/2018 282 130 - 400 10*3/mm3 Final     Creatinine   Date Value Ref Range Status   05/14/2018 2.84 (H) 0.50 - 1.40 mg/dL Final     AST (SGOT)   Date Value Ref Range Status   05/14/2018 18 7 - 45 U/L Final     Anemia of chronic kidney disease.  Hemoglobin today is 10.4gm%,  continue Procrit.  Patient clinically asymptomatic.  Over the last 3 months for MCV of, therefore I am would check vitamin B12 and folic acid on today's draw and I have asked her to start taking oral folic acid 400 µg daily as well as start receiving vitamin B12 1000 µg subcutaneous every 4 weekly.  If the MCV remains high in another 3-4 months I may consider doing a bone marrow biopsy to diagnose  myelodysplasia.  The last colonoscopy was done within the last 5 years and a verbal report given to me by the patient it was negative.    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 I am not sure if ACTHER has help improve her Hgb 11.1gm%today.  Any way per CMS guidelines, if the Hg 7.4 therefore Procrit today. Will draw Iron studies today. Clinically doing well, says she worked in the yard over the weekend. ACTH is stopped by Nephrology Svx. Recheck CBC Wed, if Iron is low will infuse Iron, however, if Hgb less then 8gm, will xfuse PRBC ( apparently she bled from Heammorhoids ).    Repeat Hgb today 7.6, however, pt clinically asymptomatic, therefore, will OBSERVE and recheck CBC again on Monday. Meanwhile check stools for Ob.  Toady Hgb still low at 7.4gm%, however pt is doing fine clinically asymptomatic. I believe she is dilutional from Volume overload 3rd space fluid from ACHTAR that was Rx and stopped last month.  Since she is asymptomatic, will OBSERVE and redo her CBC next week. However, I have advised her that my cuit off for transfusion is Hgb 7gm%.    1/3 stool cards +OB. Hgb today 7.2 however, pt is completely asymptomatic and able to preform ADLs. Will increase dose Procrit to 02663 units due May 29th.  Meanwhile check Hgb wkly.

## 2018-05-21 ENCOUNTER — APPOINTMENT (OUTPATIENT)
Dept: LAB | Facility: HOSPITAL | Age: 78
End: 2018-05-21

## 2018-05-21 ENCOUNTER — OFFICE VISIT (OUTPATIENT)
Dept: ONCOLOGY | Facility: CLINIC | Age: 78
End: 2018-05-21

## 2018-05-21 VITALS
DIASTOLIC BLOOD PRESSURE: 82 MMHG | RESPIRATION RATE: 16 BRPM | HEIGHT: 62 IN | WEIGHT: 127.8 LBS | TEMPERATURE: 97.6 F | SYSTOLIC BLOOD PRESSURE: 128 MMHG | BODY MASS INDEX: 23.52 KG/M2 | HEART RATE: 52 BPM

## 2018-05-21 DIAGNOSIS — N18.30 ANEMIA OF CHRONIC RENAL FAILURE, STAGE 3 (MODERATE) (HCC): Primary | ICD-10-CM

## 2018-05-21 DIAGNOSIS — D63.1 ANEMIA OF CHRONIC RENAL FAILURE, STAGE 3 (MODERATE) (HCC): Primary | ICD-10-CM

## 2018-05-21 LAB
ALBUMIN SERPL-MCNC: 3.7 G/DL (ref 3.5–5)
ALBUMIN/GLOB SERPL: 1.4 G/DL (ref 1.1–2.5)
ALP SERPL-CCNC: 68 U/L (ref 24–120)
ALT SERPL W P-5'-P-CCNC: 17 U/L (ref 0–54)
ANION GAP SERPL CALCULATED.3IONS-SCNC: 12 MMOL/L (ref 4–13)
AST SERPL-CCNC: 25 U/L (ref 7–45)
BASOPHILS # BLD AUTO: 0.04 10*3/MM3 (ref 0–0.2)
BASOPHILS NFR BLD AUTO: 0.5 % (ref 0–2)
BILIRUB SERPL-MCNC: 0.2 MG/DL (ref 0.1–1)
BUN BLD-MCNC: 54 MG/DL (ref 5–21)
BUN/CREAT SERPL: 17.5 (ref 7–25)
CALCIUM SPEC-SCNC: 8.7 MG/DL (ref 8.4–10.4)
CHLORIDE SERPL-SCNC: 97 MMOL/L (ref 98–110)
CO2 SERPL-SCNC: 18 MMOL/L (ref 24–31)
CREAT BLD-MCNC: 3.09 MG/DL (ref 0.5–1.4)
DEPRECATED RDW RBC AUTO: 57.8 FL (ref 40–54)
EOSINOPHIL # BLD AUTO: 0.01 10*3/MM3 (ref 0–0.7)
EOSINOPHIL NFR BLD AUTO: 0.1 % (ref 0–4)
ERYTHROCYTE [DISTWIDTH] IN BLOOD BY AUTOMATED COUNT: 16.5 % (ref 12–15)
GFR SERPL CREATININE-BSD FRML MDRD: 15 ML/MIN/1.73
GLOBULIN UR ELPH-MCNC: 2.7 GM/DL
GLUCOSE BLD-MCNC: 89 MG/DL (ref 70–100)
HCT VFR BLD AUTO: 25.2 % (ref 37–47)
HGB BLD-MCNC: 7.8 G/DL (ref 12–16)
HOLD SPECIMEN: NORMAL
HOLD SPECIMEN: NORMAL
IMM GRANULOCYTES # BLD: 0.04 10*3/MM3 (ref 0–0.03)
IMM GRANULOCYTES NFR BLD: 0.5 % (ref 0–5)
LYMPHOCYTES # BLD AUTO: 1.83 10*3/MM3 (ref 0.72–4.86)
LYMPHOCYTES NFR BLD AUTO: 22.9 % (ref 15–45)
MCH RBC QN AUTO: 29.9 PG (ref 28–32)
MCHC RBC AUTO-ENTMCNC: 31 G/DL (ref 33–36)
MCV RBC AUTO: 96.6 FL (ref 82–98)
MONOCYTES # BLD AUTO: 0.92 10*3/MM3 (ref 0.19–1.3)
MONOCYTES NFR BLD AUTO: 11.5 % (ref 4–12)
NEUTROPHILS # BLD AUTO: 5.14 10*3/MM3 (ref 1.87–8.4)
NEUTROPHILS NFR BLD AUTO: 64.5 % (ref 39–78)
NRBC BLD MANUAL-RTO: 0 /100 WBC (ref 0–0)
PLATELET # BLD AUTO: 312 10*3/MM3 (ref 130–400)
PMV BLD AUTO: 10.2 FL (ref 6–12)
POTASSIUM BLD-SCNC: 3.9 MMOL/L (ref 3.5–5.3)
PROT SERPL-MCNC: 6.4 G/DL (ref 6.3–8.7)
RBC # BLD AUTO: 2.61 10*6/MM3 (ref 4.2–5.4)
SODIUM BLD-SCNC: 127 MMOL/L (ref 135–145)
WBC NRBC COR # BLD: 7.98 10*3/MM3 (ref 4.8–10.8)

## 2018-05-21 PROCEDURE — 80053 COMPREHEN METABOLIC PANEL: CPT | Performed by: INTERNAL MEDICINE

## 2018-05-21 PROCEDURE — 85025 COMPLETE CBC W/AUTO DIFF WBC: CPT | Performed by: INTERNAL MEDICINE

## 2018-05-21 PROCEDURE — 36415 COLL VENOUS BLD VENIPUNCTURE: CPT

## 2018-05-21 PROCEDURE — 99214 OFFICE O/P EST MOD 30 MIN: CPT | Performed by: INTERNAL MEDICINE

## 2018-05-21 NOTE — PROGRESS NOTES
Patient ID:   Susanne Valentine is a 77 y.o. female.  Referring Physician:   No referring provider defined for this encounter.  Primary Care Provider:  Saman Castorena DO    Assessment/Plan:  Assessment   Patient Active Problem List   Diagnosis   • Anemia of chronic kidney failure   • Anemia of chronic renal failure, stage 3 (moderate)   • B12 deficiency     Cancer Staging Information:  No matching staging information was found for the patient.    There are no diagnoses linked to this encounter.  The patient has a stable anemia from her chronic kidney disease, with the use of Procrit once a month as necessary.  We will hold the treatment today, and have her come back in 1 month with a repeat CBC, we will proceed with Procrit at 40,000 units subcutaneously if her hemoglobin is less than 10 g/DL.  Patient ID:   Susanne Valentine is a 77 y.o. female.  Referring Physician:   No referring provider defined for this encounter.  Primary Care Provider:  Saman Castorena DO    Assessment/Plan:  Assessment   Patient Active Problem List   Diagnosis   • Anemia of chronic kidney failure   • Anemia of chronic renal failure, stage 3 (moderate)   • B12 deficiency     Cancer Staging Information:  No matching staging information was found for the patient.    There are no diagnoses linked to this encounter.  The patient has a stable anemia from her chronic kidney disease, with the use of Procrit once a month as necessary.  We will hold the treatment today, and have her come back in 1 month with a repeat CBC, we will proceed with Procrit at 40,000 units subcutaneously if her hemoglobin is less than 10 g/DL.     Interval History:  The patient is here for follow-up and for possible consideration for Procrit.  She has no new complaints at this time and has been doing well.  Apparently her   5 years ago, and she reminded me that I am seeing her in the past when I was still here.  She did not get any Procrit on her last  "visit    Interval Notes:  The following portions of the patient's history were reviewed and updated as appropriate: allergies, current medications, past family history, past medical history, past social history, past surgical history and problem list.     History of Present Illness   76-year-old white lady with chronic kidney disease has an anemia and has been on ESAs since 2008. She has been tolerating it well and her anemia has been stable with a supplement.    Review of Systems   Constitutional: Negative.    HENT: Negative.    Eyes: Negative.    Respiratory: Negative.    Cardiovascular: Negative.    Gastrointestinal: Negative.    Endocrine: Negative.    Genitourinary: Negative.    Musculoskeletal: Negative.    Allergic/Immunologic: Negative.    Neurological: Negative.    Hematological: Negative.    Psychiatric/Behavioral: Negative.         Physical Exam:  Vital Signs for this encounter:  BSA: 1.58 meters squared  /82   Pulse 52   Temp 97.6 °F (36.4 °C) (Tympanic)   Resp 16   Ht 157.5 cm (62.01\")   Wt 58 kg (127 lb 12.8 oz)   BMI 23.37 kg/m²     Physical Exam   Constitutional: She is oriented to person, place, and time. She appears well-developed and well-nourished. No distress.   HENT:   Head: Normocephalic and atraumatic.   Nose: Nose normal.   Mouth/Throat: Oropharynx is clear and moist. No oropharyngeal exudate.   Eyes: Conjunctivae and EOM are normal. Pupils are equal, round, and reactive to light. No scleral icterus.   Neck: Normal range of motion. Neck supple. No JVD present. No thyromegaly present.   Cardiovascular: Normal rate, regular rhythm and normal heart sounds.  Exam reveals no gallop and no friction rub.    Pulmonary/Chest: Effort normal and breath sounds normal. No respiratory distress. She has no wheezes. She has no rales. She exhibits no tenderness.   Abdominal: Soft. Bowel sounds are normal. She exhibits no distension. There is no tenderness. There is no guarding.   Musculoskeletal: " Normal range of motion. She exhibits no edema or tenderness.   Lymphadenopathy:     She has no cervical adenopathy.   Neurological: She is alert and oriented to person, place, and time. No cranial nerve deficit.   Skin: Skin is warm and dry.   Few purpuric spots, forearms   Psychiatric: She has a normal mood and affect. Her behavior is normal. Judgment and thought content normal.       Performance Status:  Asymptomatic    Results:  WBC   Date Value Ref Range Status   2018 7.98 4.80 - 10.80 10*3/mm3 Final     Hemoglobin   Date Value Ref Range Status   2018 7.8 (L) 12.0 - 16.0 g/dL Final     Hematocrit   Date Value Ref Range Status   2018 25.2 (L) 37.0 - 47.0 % Final     Platelets   Date Value Ref Range Status   2018 312 130 - 400 10*3/mm3 Final     Creatinine   Date Value Ref Range Status   2018 3.09 (H) 0.50 - 1.40 mg/dL Final     AST (SGOT)   Date Value Ref Range Status   2018 25 7 - 45 U/L Final        Interval History:  The patient is here for follow-up and for possible consideration for Procrit.  She has no new complaints at this time and has been doing well.  Apparently her   5 years ago, and she reminded me that I am seeing her in the past when I was still here.  She did not get any Procrit on her last visit    Interval Notes:  The following portions of the patient's history were reviewed and updated as appropriate: allergies, current medications, past family history, past medical history, past social history, past surgical history and problem list.     History of Present Illness   76-year-old white lady with chronic kidney disease has an anemia and has been on ESAs since . She has been tolerating it well and her anemia has been stable with a supplement.    Review of Systems   Constitutional: Negative.    HENT: Negative.    Eyes: Negative.    Respiratory: Negative.    Cardiovascular: Negative.    Gastrointestinal: Negative.    Endocrine: Negative.   "  Genitourinary: Negative.    Musculoskeletal: Negative.    Allergic/Immunologic: Negative.    Neurological: Negative.    Hematological: Negative.    Psychiatric/Behavioral: Negative.         Physical Exam:  Vital Signs for this encounter:  BSA: 1.58 meters squared  /82   Pulse 52   Temp 97.6 °F (36.4 °C) (Tympanic)   Resp 16   Ht 157.5 cm (62.01\")   Wt 58 kg (127 lb 12.8 oz)   BMI 23.37 kg/m²     Physical Exam   Constitutional: She is oriented to person, place, and time. She appears well-developed and well-nourished. No distress.   HENT:   Head: Normocephalic and atraumatic.   Nose: Nose normal.   Mouth/Throat: Oropharynx is clear and moist. No oropharyngeal exudate.   Eyes: Conjunctivae and EOM are normal. Pupils are equal, round, and reactive to light. No scleral icterus.   Neck: Normal range of motion. Neck supple. No JVD present. No thyromegaly present.   Cardiovascular: Normal rate, regular rhythm and normal heart sounds.  Exam reveals no gallop and no friction rub.    Pulmonary/Chest: Effort normal and breath sounds normal. No respiratory distress. She has no wheezes. She has no rales. She exhibits no tenderness.   Abdominal: Soft. Bowel sounds are normal. She exhibits no distension. There is no tenderness. There is no guarding.   Musculoskeletal: Normal range of motion. She exhibits no edema or tenderness.   Lymphadenopathy:     She has no cervical adenopathy.   Neurological: She is alert and oriented to person, place, and time. No cranial nerve deficit.   Skin: Skin is warm and dry.   Few purpuric spots, forearms   Psychiatric: She has a normal mood and affect. Her behavior is normal. Judgment and thought content normal.       Performance Status:  Asymptomatic    Results:  WBC   Date Value Ref Range Status   05/21/2018 7.98 4.80 - 10.80 10*3/mm3 Final     Hemoglobin   Date Value Ref Range Status   05/21/2018 7.8 (L) 12.0 - 16.0 g/dL Final     Hematocrit   Date Value Ref Range Status   05/21/2018 " 25.2 (L) 37.0 - 47.0 % Final     Platelets   Date Value Ref Range Status   05/21/2018 312 130 - 400 10*3/mm3 Final     Creatinine   Date Value Ref Range Status   05/21/2018 3.09 (H) 0.50 - 1.40 mg/dL Final     AST (SGOT)   Date Value Ref Range Status   05/21/2018 25 7 - 45 U/L Final     Anemia of chronic kidney disease.  Hemoglobin today is 10.4gm%,  continue Procrit.  Patient clinically asymptomatic.  Over the last 3 months for MCV of, therefore I am would check vitamin B12 and folic acid on today's draw and I have asked her to start taking oral folic acid 400 µg daily as well as start receiving vitamin B12 1000 µg subcutaneous every 4 weekly.  If the MCV remains high in another 3-4 months I may consider doing a bone marrow biopsy to diagnose myelodysplasia.  The last colonoscopy was done within the last 5 years and a verbal report given to me by the patient it was negative.    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 I am not sure if ACTHER has help improve her Hgb 11.1gm%today.  Any way per CMS guidelines, if the Hg 7.4 therefore Procrit today. Will draw Iron studies today. Clinically doing well, says she worked in the yard over the weekend. ACTH is stopped by Nephrology Svx. Recheck CBC Wed, if Iron is low will infuse Iron, however, if Hgb less then 8gm, will xfuse PRBC ( apparently she bled from Heammorhoids ).    Repeat Hgb today 7.6, however, pt clinically asymptomatic, therefore, will OBSERVE and recheck CBC again on Monday. Meanwhile check stools for Ob.  Toady Hgb still low at 7.4gm%, however pt is doing fine clinically asymptomatic. I believe she is dilutional from Volume overload 3rd space fluid from ACHTAR that was Rx and stopped last month.  Since she is asymptomatic, will OBSERVE and redo her CBC next week. However, I have advised her that my cuit off for transfusion is Hgb  7gm%.    1/3 stool cards +OB. Hgb today 7.2 however, pt is completely asymptomatic and able to preform ADLs. Will increase dose Procrit to [id number] units due May 29th.  Meanwhile check Hgb wkly.    Creatinine increased to 3.09, with worsening renal failure, there is decreasing Hgb. If the Hgb is less then 8gm% next week, then the plan is to increase the Procrit to 60,000 Units every month.

## 2018-05-29 ENCOUNTER — LAB (OUTPATIENT)
Dept: LAB | Facility: HOSPITAL | Age: 78
End: 2018-05-29

## 2018-05-29 ENCOUNTER — OFFICE VISIT (OUTPATIENT)
Dept: ONCOLOGY | Facility: CLINIC | Age: 78
End: 2018-05-29

## 2018-05-29 ENCOUNTER — TELEPHONE (OUTPATIENT)
Dept: ONCOLOGY | Facility: CLINIC | Age: 78
End: 2018-05-29

## 2018-05-29 ENCOUNTER — INFUSION (OUTPATIENT)
Dept: ONCOLOGY | Facility: HOSPITAL | Age: 78
End: 2018-05-29
Attending: INTERNAL MEDICINE

## 2018-05-29 VITALS
TEMPERATURE: 97.2 F | WEIGHT: 123.9 LBS | RESPIRATION RATE: 16 BRPM | SYSTOLIC BLOOD PRESSURE: 148 MMHG | HEIGHT: 62 IN | HEART RATE: 96 BPM | BODY MASS INDEX: 22.8 KG/M2 | DIASTOLIC BLOOD PRESSURE: 88 MMHG

## 2018-05-29 VITALS
TEMPERATURE: 97.6 F | RESPIRATION RATE: 20 BRPM | DIASTOLIC BLOOD PRESSURE: 65 MMHG | OXYGEN SATURATION: 100 % | SYSTOLIC BLOOD PRESSURE: 125 MMHG | WEIGHT: 123 LBS | HEIGHT: 62 IN | HEART RATE: 77 BPM | BODY MASS INDEX: 22.63 KG/M2

## 2018-05-29 DIAGNOSIS — N18.30 ANEMIA OF CHRONIC KIDNEY FAILURE, STAGE 3 (MODERATE) (HCC): ICD-10-CM

## 2018-05-29 DIAGNOSIS — E53.8 B12 DEFICIENCY: ICD-10-CM

## 2018-05-29 DIAGNOSIS — D63.1 ANEMIA OF CHRONIC RENAL FAILURE, STAGE 3 (MODERATE) (HCC): ICD-10-CM

## 2018-05-29 DIAGNOSIS — D63.1 ANEMIA OF CHRONIC KIDNEY FAILURE, STAGE 3 (MODERATE) (HCC): ICD-10-CM

## 2018-05-29 DIAGNOSIS — D63.1 ANEMIA OF CHRONIC RENAL FAILURE, STAGE 3 (MODERATE) (HCC): Primary | ICD-10-CM

## 2018-05-29 DIAGNOSIS — N18.30 ANEMIA OF CHRONIC RENAL FAILURE, STAGE 3 (MODERATE) (HCC): ICD-10-CM

## 2018-05-29 DIAGNOSIS — N18.30 ANEMIA OF CHRONIC RENAL FAILURE, STAGE 3 (MODERATE) (HCC): Primary | ICD-10-CM

## 2018-05-29 LAB
ALBUMIN SERPL-MCNC: 3.9 G/DL (ref 3.5–5)
ALBUMIN/GLOB SERPL: 1.4 G/DL (ref 1.1–2.5)
ALP SERPL-CCNC: 67 U/L (ref 24–120)
ALT SERPL W P-5'-P-CCNC: 19 U/L (ref 0–54)
ANION GAP SERPL CALCULATED.3IONS-SCNC: 13 MMOL/L (ref 4–13)
AST SERPL-CCNC: 23 U/L (ref 7–45)
BASOPHILS # BLD AUTO: 0.03 10*3/MM3 (ref 0–0.2)
BASOPHILS NFR BLD AUTO: 0.4 % (ref 0–2)
BILIRUB SERPL-MCNC: 0.2 MG/DL (ref 0.1–1)
BUN BLD-MCNC: 60 MG/DL (ref 5–21)
BUN/CREAT SERPL: 18.4 (ref 7–25)
CALCIUM SPEC-SCNC: 9.1 MG/DL (ref 8.4–10.4)
CHLORIDE SERPL-SCNC: 88 MMOL/L (ref 98–110)
CO2 SERPL-SCNC: 15 MMOL/L (ref 24–31)
CREAT BLD-MCNC: 3.26 MG/DL (ref 0.5–1.4)
DEPRECATED RDW RBC AUTO: 49.5 FL (ref 40–54)
EOSINOPHIL # BLD AUTO: 0.01 10*3/MM3 (ref 0–0.7)
EOSINOPHIL NFR BLD AUTO: 0.1 % (ref 0–4)
ERYTHROCYTE [DISTWIDTH] IN BLOOD BY AUTOMATED COUNT: 15.2 % (ref 12–15)
GFR SERPL CREATININE-BSD FRML MDRD: 14 ML/MIN/1.73
GLOBULIN UR ELPH-MCNC: 2.7 GM/DL
GLUCOSE BLD-MCNC: 123 MG/DL (ref 70–100)
HCT VFR BLD AUTO: 23 % (ref 37–47)
HGB BLD-MCNC: 7.9 G/DL (ref 12–16)
HOLD SPECIMEN: NORMAL
HOLD SPECIMEN: NORMAL
IMM GRANULOCYTES # BLD: 0.03 10*3/MM3 (ref 0–0.03)
IMM GRANULOCYTES NFR BLD: 0.4 % (ref 0–5)
LYMPHOCYTES # BLD AUTO: 1.64 10*3/MM3 (ref 0.72–4.86)
LYMPHOCYTES NFR BLD AUTO: 23.5 % (ref 15–45)
MCH RBC QN AUTO: 30.9 PG (ref 28–32)
MCHC RBC AUTO-ENTMCNC: 34.3 G/DL (ref 33–36)
MCV RBC AUTO: 89.8 FL (ref 82–98)
MONOCYTES # BLD AUTO: 0.74 10*3/MM3 (ref 0.19–1.3)
MONOCYTES NFR BLD AUTO: 10.6 % (ref 4–12)
NEUTROPHILS # BLD AUTO: 4.54 10*3/MM3 (ref 1.87–8.4)
NEUTROPHILS NFR BLD AUTO: 65 % (ref 39–78)
NRBC BLD MANUAL-RTO: 0 /100 WBC (ref 0–0)
PLATELET # BLD AUTO: 309 10*3/MM3 (ref 130–400)
PMV BLD AUTO: 9.9 FL (ref 6–12)
POTASSIUM BLD-SCNC: 3.7 MMOL/L (ref 3.5–5.3)
PROT SERPL-MCNC: 6.6 G/DL (ref 6.3–8.7)
RBC # BLD AUTO: 2.56 10*6/MM3 (ref 4.2–5.4)
SODIUM BLD-SCNC: 116 MMOL/L (ref 135–145)
WBC NRBC COR # BLD: 6.99 10*3/MM3 (ref 4.8–10.8)

## 2018-05-29 PROCEDURE — 96372 THER/PROPH/DIAG INJ SC/IM: CPT

## 2018-05-29 PROCEDURE — 85025 COMPLETE CBC W/AUTO DIFF WBC: CPT

## 2018-05-29 PROCEDURE — 25010000002 CYANOCOBALAMIN PER 1000 MCG: Performed by: INTERNAL MEDICINE

## 2018-05-29 PROCEDURE — 99214 OFFICE O/P EST MOD 30 MIN: CPT | Performed by: INTERNAL MEDICINE

## 2018-05-29 PROCEDURE — 80053 COMPREHEN METABOLIC PANEL: CPT

## 2018-05-29 PROCEDURE — 63510000001 EPOETIN ALFA PER 1000 UNITS: Performed by: INTERNAL MEDICINE

## 2018-05-29 PROCEDURE — 36415 COLL VENOUS BLD VENIPUNCTURE: CPT

## 2018-05-29 RX ORDER — CYANOCOBALAMIN 1000 UG/ML
1000 INJECTION, SOLUTION INTRAMUSCULAR; SUBCUTANEOUS ONCE
Status: CANCELLED
Start: 2018-05-29 | End: 2018-05-29

## 2018-05-29 RX ORDER — CYANOCOBALAMIN 1000 UG/ML
1000 INJECTION, SOLUTION INTRAMUSCULAR; SUBCUTANEOUS ONCE
Status: DISCONTINUED | OUTPATIENT
Start: 2018-05-29 | End: 2018-05-29 | Stop reason: SDUPTHER

## 2018-05-29 RX ORDER — CYANOCOBALAMIN 1000 UG/ML
1000 INJECTION, SOLUTION INTRAMUSCULAR; SUBCUTANEOUS ONCE
Status: CANCELLED | OUTPATIENT
Start: 2018-05-29

## 2018-05-29 RX ORDER — CYANOCOBALAMIN 1000 UG/ML
1000 INJECTION, SOLUTION INTRAMUSCULAR; SUBCUTANEOUS ONCE
Status: COMPLETED | OUTPATIENT
Start: 2018-05-29 | End: 2018-05-29

## 2018-05-29 RX ADMIN — ERYTHROPOIETIN 40000 UNITS: 40000 INJECTION, SOLUTION INTRAVENOUS; SUBCUTANEOUS at 11:57

## 2018-05-29 RX ADMIN — ERYTHROPOIETIN 20000 UNITS: 20000 INJECTION, SOLUTION INTRAVENOUS; SUBCUTANEOUS at 11:58

## 2018-05-29 RX ADMIN — CYANOCOBALAMIN 1000 MCG: 1000 INJECTION, SOLUTION INTRAMUSCULAR at 11:56

## 2018-05-29 NOTE — TELEPHONE ENCOUNTER
Received message from  Lab Dept. With Critical Lab values of   SODIUM: 116  Message was noted that it had been ran twice to check the numbers.  This lab values was given to Dr Wood to address.

## 2018-05-29 NOTE — PROGRESS NOTES
Patient ID:   Susanne Valentine is a 77 y.o. female.  Referring Physician:   García Wood MD  [address] [place] [address] [address]  [place] [address]  Primary Care Provider:  Saman Castorena DO    Assessment/Plan:  Assessment   Patient Active Problem List   Diagnosis   • Anemia of chronic kidney failure   • Anemia of chronic renal failure, stage 3 (moderate)   • B12 deficiency     Cancer Staging Information:  No matching staging information was found for the patient.    Diagnoses and all orders for this visit:    Anemia of chronic renal failure, stage 3 (moderate)  -     Clinic Appointment Request    Anemia of chronic kidney failure, stage 3 (moderate)  -     Clinic Appointment Request      The patient has a stable anemia from her chronic kidney disease, with the use of Procrit once a month as necessary.  We will hold the treatment today, and have her come back in 1 month with a repeat CBC, we will proceed with Procrit at 40,000 units subcutaneously if her hemoglobin is less than 10 g/DL.  Patient ID:   Susanne Valentine is a 77 y.o. female.  Referring Physician:   García Wood MD  [address] [place] [address] [address] [place] [address]  Primary Care Provider:  Saman Castorena DO    Assessment/Plan:  Assessment   Patient Active Problem List   Diagnosis   • Anemia of chronic kidney failure   • Anemia of chronic renal failure, stage 3 (moderate)   • B12 deficiency     Cancer Staging Information:  No matching staging information was found for the patient.    Diagnoses and all orders for this visit:    Anemia of chronic renal failure, stage 3 (moderate)  -     Clinic Appointment Request    Anemia of chronic kidney failure, stage 3 (moderate)  -     Clinic Appointment Request      The patient has a stable anemia from her chronic kidney disease, with the use of Procrit once a month as necessary.  We will hold the treatment today, and have her come back in 1 month with a repeat CBC, we will proceed with  Procrit at 40,000 units subcutaneously if her hemoglobin is less than 10 g/DL.     Interval History:  The patient is here for follow-up and for possible consideration for Procrit.  She has no new complaints at this time and has been doing well.  Apparently her   5 years ago, and she reminded me that I am seeing her in the past when I was still here.  She did not get any Procrit on her last visit    Interval Notes:  The following portions of the patient's history were reviewed and updated as appropriate: allergies, current medications, past family history, past medical history, past social history, past surgical history and problem list.     History of Present Illness   76-year-old white lady with chronic kidney disease has an anemia and has been on ESAs since . She has been tolerating it well and her anemia has been stable with a supplement.    Review of Systems   Constitutional: Negative.    HENT: Negative.    Eyes: Negative.    Respiratory: Negative.    Cardiovascular: Negative.    Gastrointestinal: Negative.    Endocrine: Negative.    Genitourinary: Negative.    Musculoskeletal: Negative.    Allergic/Immunologic: Negative.    Neurological: Negative.    Hematological: Negative.    Psychiatric/Behavioral: Negative.         Physical Exam:  Vital Signs for this encounter:  BSA: There is no height or weight on file to calculate BSA.  There were no vitals taken for this visit.    Physical Exam   Constitutional: She is oriented to person, place, and time. She appears well-developed and well-nourished. No distress.   HENT:   Head: Normocephalic and atraumatic.   Nose: Nose normal.   Mouth/Throat: Oropharynx is clear and moist. No oropharyngeal exudate.   Eyes: Conjunctivae and EOM are normal. Pupils are equal, round, and reactive to light. No scleral icterus.   Neck: Normal range of motion. Neck supple. No JVD present. No thyromegaly present.   Cardiovascular: Normal rate, regular rhythm and normal heart  sounds.  Exam reveals no gallop and no friction rub.    Pulmonary/Chest: Effort normal and breath sounds normal. No respiratory distress. She has no wheezes. She has no rales. She exhibits no tenderness.   Abdominal: Soft. Bowel sounds are normal. She exhibits no distension. There is no tenderness. There is no guarding.   Musculoskeletal: Normal range of motion. She exhibits no edema or tenderness.   Lymphadenopathy:     She has no cervical adenopathy.   Neurological: She is alert and oriented to person, place, and time. No cranial nerve deficit.   Skin: Skin is warm and dry.   Few purpuric spots, forearms   Psychiatric: She has a normal mood and affect. Her behavior is normal. Judgment and thought content normal.       Performance Status:  Asymptomatic    Results:  WBC   Date Value Ref Range Status   2018 7.98 4.80 - 10.80 10*3/mm3 Final     Hemoglobin   Date Value Ref Range Status   2018 7.8 (L) 12.0 - 16.0 g/dL Final     Hematocrit   Date Value Ref Range Status   2018 25.2 (L) 37.0 - 47.0 % Final     Platelets   Date Value Ref Range Status   2018 312 130 - 400 10*3/mm3 Final     Creatinine   Date Value Ref Range Status   2018 3.09 (H) 0.50 - 1.40 mg/dL Final     AST (SGOT)   Date Value Ref Range Status   2018 25 7 - 45 U/L Final        Interval History:  The patient is here for follow-up and for possible consideration for Procrit.  She has no new complaints at this time and has been doing well.  Apparently her   5 years ago, and she reminded me that I am seeing her in the past when I was still here.  She did not get any Procrit on her last visit    Interval Notes:  The following portions of the patient's history were reviewed and updated as appropriate: allergies, current medications, past family history, past medical history, past social history, past surgical history and problem list.     History of Present Illness   76-year-old white lady with chronic kidney  disease has an anemia and has been on ESAs since 2008. She has been tolerating it well and her anemia has been stable with a supplement.    Review of Systems   Constitutional: Negative.    HENT: Negative.    Eyes: Negative.    Respiratory: Negative.    Cardiovascular: Negative.    Gastrointestinal: Negative.    Endocrine: Negative.    Genitourinary: Negative.    Musculoskeletal: Negative.    Allergic/Immunologic: Negative.    Neurological: Negative.    Hematological: Negative.    Psychiatric/Behavioral: Negative.         Physical Exam:  Vital Signs for this encounter:  BSA: There is no height or weight on file to calculate BSA.  There were no vitals taken for this visit.    Physical Exam   Constitutional: She is oriented to person, place, and time. She appears well-developed and well-nourished. No distress.   HENT:   Head: Normocephalic and atraumatic.   Nose: Nose normal.   Mouth/Throat: Oropharynx is clear and moist. No oropharyngeal exudate.   Eyes: Conjunctivae and EOM are normal. Pupils are equal, round, and reactive to light. No scleral icterus.   Neck: Normal range of motion. Neck supple. No JVD present. No thyromegaly present.   Cardiovascular: Normal rate, regular rhythm and normal heart sounds.  Exam reveals no gallop and no friction rub.    Pulmonary/Chest: Effort normal and breath sounds normal. No respiratory distress. She has no wheezes. She has no rales. She exhibits no tenderness.   Abdominal: Soft. Bowel sounds are normal. She exhibits no distension. There is no tenderness. There is no guarding.   Musculoskeletal: Normal range of motion. She exhibits no edema or tenderness.   Lymphadenopathy:     She has no cervical adenopathy.   Neurological: She is alert and oriented to person, place, and time. No cranial nerve deficit.   Skin: Skin is warm and dry.   Few purpuric spots, forearms   Psychiatric: She has a normal mood and affect. Her behavior is normal. Judgment and thought content normal.        Performance Status:  Asymptomatic    Results:  WBC   Date Value Ref Range Status   05/21/2018 7.98 4.80 - 10.80 10*3/mm3 Final     Hemoglobin   Date Value Ref Range Status   05/21/2018 7.8 (L) 12.0 - 16.0 g/dL Final     Hematocrit   Date Value Ref Range Status   05/21/2018 25.2 (L) 37.0 - 47.0 % Final     Platelets   Date Value Ref Range Status   05/21/2018 312 130 - 400 10*3/mm3 Final     Creatinine   Date Value Ref Range Status   05/21/2018 3.09 (H) 0.50 - 1.40 mg/dL Final     AST (SGOT)   Date Value Ref Range Status   05/21/2018 25 7 - 45 U/L Final     Anemia of chronic kidney disease.  Hemoglobin today is 10.4gm%,  continue Procrit.  Patient clinically asymptomatic.  Over the last 3 months for MCV of, therefore I am would check vitamin B12 and folic acid on today's draw and I have asked her to start taking oral folic acid 400 µg daily as well as start receiving vitamin B12 1000 µg subcutaneous every 4 weekly.  If the MCV remains high in another 3-4 months I may consider doing a bone marrow biopsy to diagnose myelodysplasia.  The last colonoscopy was done within the last 5 years and a verbal report given to me by the patient it was negative.    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 I am not sure if ACTHER has help improve her Hgb 11.1gm%today.  Any way per CMS guidelines, if the Hg 7.4 therefore Procrit today. Will draw Iron studies today. Clinically doing well, says she worked in the yard over the weekend. ACTH is stopped by Nephrology Svx. Recheck CBC Wed, if Iron is low will infuse Iron, however, if Hgb less then 8gm, will xfuse PRBC ( apparently she bled from Heammorhoids ).    Repeat Hgb today 7.6, however, pt clinically asymptomatic, therefore, will OBSERVE and recheck CBC again on Monday. Meanwhile check stools for Ob.  Toady Hgb still low at 7.4gm%, however pt is doing fine  clinically asymptomatic. I believe she is dilutional from Volume overload 3rd space fluid from ACHTAR that was Rx and stopped last month.  Since she is asymptomatic, will OBSERVE and redo her CBC next week. However, I have advised her that my cuit off for transfusion is Hgb 7gm%.    1/3 stool cards +OB. Hgb today 7.2 however, pt is completely asymptomatic and able to preform ADLs. Will increase dose Procrit to 16717 units due May 29th.  Meanwhile check Hgb wkly.    Creatinine increased to 3.09, with worsening renal failure, there is decreasing Hgb. If the Hgb is less then 8gm% next week, then the plan is to increase the Procrit to 60,000 Units every month STARTING TODAY.

## 2018-06-01 ENCOUNTER — APPOINTMENT (OUTPATIENT)
Dept: GENERAL RADIOLOGY | Age: 78
DRG: 641 | End: 2018-06-01
Payer: MEDICARE

## 2018-06-01 ENCOUNTER — HOSPITAL ENCOUNTER (INPATIENT)
Age: 78
LOS: 3 days | Discharge: HOME | DRG: 641 | End: 2018-06-04
Attending: EMERGENCY MEDICINE | Admitting: HOSPITALIST
Payer: MEDICARE

## 2018-06-01 DIAGNOSIS — E87.1 HYPONATREMIA: Primary | ICD-10-CM

## 2018-06-01 DIAGNOSIS — R60.0 BILATERAL EDEMA OF LOWER EXTREMITY: ICD-10-CM

## 2018-06-01 PROBLEM — I10 HYPERTENSION: Status: ACTIVE | Noted: 2018-06-01

## 2018-06-01 PROBLEM — I34.1 MITRAL VALVE PROLAPSE: Status: ACTIVE | Noted: 2018-06-01

## 2018-06-01 PROBLEM — N05.5: Status: ACTIVE | Noted: 2018-06-01

## 2018-06-01 PROBLEM — N05.2 MEMBRANOUS GLOMERULONEPHRITIS: Status: ACTIVE | Noted: 2018-06-01

## 2018-06-01 PROBLEM — K21.9 ACID REFLUX: Status: ACTIVE | Noted: 2018-06-01

## 2018-06-01 LAB
ALBUMIN SERPL-MCNC: 4.1 G/DL (ref 3.5–5.2)
ALP BLD-CCNC: 70 U/L (ref 35–104)
ALT SERPL-CCNC: 6 U/L (ref 5–33)
ANION GAP SERPL CALCULATED.3IONS-SCNC: 18 MMOL/L (ref 7–19)
ANION GAP SERPL CALCULATED.3IONS-SCNC: 20 MMOL/L (ref 7–19)
AST SERPL-CCNC: 12 U/L (ref 5–32)
BASOPHILS ABSOLUTE: 0 K/UL (ref 0–0.2)
BASOPHILS RELATIVE PERCENT: 0.3 % (ref 0–1)
BILIRUB SERPL-MCNC: <0.2 MG/DL (ref 0.2–1.2)
BUN BLDV-MCNC: 55 MG/DL (ref 8–23)
BUN BLDV-MCNC: 56 MG/DL (ref 8–23)
CALCIUM SERPL-MCNC: 8.2 MG/DL (ref 8.8–10.2)
CALCIUM SERPL-MCNC: 9 MG/DL (ref 8.8–10.2)
CHLORIDE BLD-SCNC: 80 MMOL/L (ref 98–111)
CHLORIDE BLD-SCNC: 82 MMOL/L (ref 98–111)
CO2: 16 MMOL/L (ref 22–29)
CO2: 16 MMOL/L (ref 22–29)
CREAT SERPL-MCNC: 2.8 MG/DL (ref 0.5–0.9)
CREAT SERPL-MCNC: 2.9 MG/DL (ref 0.5–0.9)
CREATININE URINE: 29.1 MG/DL (ref 4.2–622)
EOSINOPHILS ABSOLUTE: 0 K/UL (ref 0–0.6)
EOSINOPHILS RELATIVE PERCENT: 0.2 % (ref 0–5)
FERRITIN: 153.1 NG/ML (ref 13–150)
GFR NON-AFRICAN AMERICAN: 16
GFR NON-AFRICAN AMERICAN: 16
GLUCOSE BLD-MCNC: 121 MG/DL (ref 74–109)
GLUCOSE BLD-MCNC: 134 MG/DL (ref 74–109)
HCT VFR BLD CALC: 25.7 % (ref 37–47)
HEMOGLOBIN: 8.6 G/DL (ref 12–16)
IRON SATURATION: 38 % (ref 14–50)
IRON: 113 UG/DL (ref 37–145)
LYMPHOCYTES ABSOLUTE: 1.4 K/UL (ref 1.1–4.5)
LYMPHOCYTES RELATIVE PERCENT: 22.7 % (ref 20–40)
MCH RBC QN AUTO: 30.2 PG (ref 27–31)
MCHC RBC AUTO-ENTMCNC: 33.5 G/DL (ref 33–37)
MCV RBC AUTO: 90.2 FL (ref 81–99)
MICROALBUMIN UR-MCNC: 112.9 MG/DL (ref 0–19)
MICROALBUMIN/CREAT UR-RTO: 3879.7 MG/G
MONOCYTES ABSOLUTE: 0.6 K/UL (ref 0–0.9)
MONOCYTES RELATIVE PERCENT: 10.4 % (ref 0–10)
NEUTROPHILS ABSOLUTE: 3.9 K/UL (ref 1.5–7.5)
NEUTROPHILS RELATIVE PERCENT: 65.7 % (ref 50–65)
OSMOLALITY: 265 MOSM/KG (ref 275–300)
PDW BLD-RTO: 14.6 % (ref 11.5–14.5)
PLATELET # BLD: 319 K/UL (ref 130–400)
PMV BLD AUTO: 9.5 FL (ref 9.4–12.3)
POTASSIUM SERPL-SCNC: 3.1 MMOL/L (ref 3.5–5)
POTASSIUM SERPL-SCNC: 3.5 MMOL/L (ref 3.5–5)
RBC # BLD: 2.85 M/UL (ref 4.2–5.4)
SODIUM BLD-SCNC: 116 MMOL/L (ref 136–145)
SODIUM BLD-SCNC: 116 MMOL/L (ref 136–145)
TOTAL IRON BINDING CAPACITY: 297 UG/DL (ref 250–400)
TOTAL PROTEIN: 6.8 G/DL (ref 6.6–8.7)
TSH SERPL DL<=0.05 MIU/L-ACNC: 3.93 UIU/ML (ref 0.27–4.2)
URIC ACID, SERUM: 8.7 MG/DL (ref 2.4–5.7)
WBC # BLD: 6 K/UL (ref 4.8–10.8)

## 2018-06-01 PROCEDURE — 6370000000 HC RX 637 (ALT 250 FOR IP): Performed by: HOSPITALIST

## 2018-06-01 PROCEDURE — 83550 IRON BINDING TEST: CPT

## 2018-06-01 PROCEDURE — 84443 ASSAY THYROID STIM HORMONE: CPT

## 2018-06-01 PROCEDURE — 99284 EMERGENCY DEPT VISIT MOD MDM: CPT

## 2018-06-01 PROCEDURE — 99285 EMERGENCY DEPT VISIT HI MDM: CPT | Performed by: EMERGENCY MEDICINE

## 2018-06-01 PROCEDURE — 2580000003 HC RX 258: Performed by: INTERNAL MEDICINE

## 2018-06-01 PROCEDURE — 1210000000 HC MED SURG R&B

## 2018-06-01 PROCEDURE — 99223 1ST HOSP IP/OBS HIGH 75: CPT | Performed by: HOSPITALIST

## 2018-06-01 PROCEDURE — 83930 ASSAY OF BLOOD OSMOLALITY: CPT

## 2018-06-01 PROCEDURE — 6370000000 HC RX 637 (ALT 250 FOR IP): Performed by: INTERNAL MEDICINE

## 2018-06-01 PROCEDURE — 83540 ASSAY OF IRON: CPT

## 2018-06-01 PROCEDURE — 6360000002 HC RX W HCPCS: Performed by: HOSPITALIST

## 2018-06-01 PROCEDURE — 2580000003 HC RX 258: Performed by: NURSE PRACTITIONER

## 2018-06-01 PROCEDURE — 82728 ASSAY OF FERRITIN: CPT

## 2018-06-01 PROCEDURE — 85025 COMPLETE CBC W/AUTO DIFF WBC: CPT

## 2018-06-01 PROCEDURE — 84550 ASSAY OF BLOOD/URIC ACID: CPT

## 2018-06-01 PROCEDURE — 82043 UR ALBUMIN QUANTITATIVE: CPT

## 2018-06-01 PROCEDURE — 80053 COMPREHEN METABOLIC PANEL: CPT

## 2018-06-01 PROCEDURE — 82570 ASSAY OF URINE CREATININE: CPT

## 2018-06-01 PROCEDURE — 2500000003 HC RX 250 WO HCPCS: Performed by: INTERNAL MEDICINE

## 2018-06-01 PROCEDURE — 36415 COLL VENOUS BLD VENIPUNCTURE: CPT

## 2018-06-01 PROCEDURE — 71046 X-RAY EXAM CHEST 2 VIEWS: CPT

## 2018-06-01 RX ORDER — FUROSEMIDE 40 MG/1
1 TABLET ORAL DAILY
Status: CANCELLED | OUTPATIENT
Start: 2018-06-01

## 2018-06-01 RX ORDER — HEPARIN SODIUM 5000 [USP'U]/ML
5000 INJECTION, SOLUTION INTRAVENOUS; SUBCUTANEOUS EVERY 8 HOURS SCHEDULED
Status: DISCONTINUED | OUTPATIENT
Start: 2018-06-01 | End: 2018-06-04 | Stop reason: HOSPADM

## 2018-06-01 RX ORDER — 0.9 % SODIUM CHLORIDE 0.9 %
1000 INTRAVENOUS SOLUTION INTRAVENOUS ONCE
Status: COMPLETED | OUTPATIENT
Start: 2018-06-01 | End: 2018-06-01

## 2018-06-01 RX ORDER — SODIUM BICARBONATE 650 MG/1
650 TABLET ORAL 3 TIMES DAILY
Status: DISCONTINUED | OUTPATIENT
Start: 2018-06-01 | End: 2018-06-02

## 2018-06-01 RX ORDER — CLONIDINE HYDROCHLORIDE 0.1 MG/1
0.2 TABLET ORAL 2 TIMES DAILY
Status: DISCONTINUED | OUTPATIENT
Start: 2018-06-01 | End: 2018-06-01

## 2018-06-01 RX ORDER — FUROSEMIDE 40 MG/1
40 TABLET ORAL DAILY
Status: DISCONTINUED | OUTPATIENT
Start: 2018-06-01 | End: 2018-06-01

## 2018-06-01 RX ORDER — MYCOPHENOLATE MOFETIL 500 MG/1
TABLET ORAL 2 TIMES DAILY
Status: ON HOLD | COMMUNITY
End: 2018-09-13 | Stop reason: ALTCHOICE

## 2018-06-01 RX ORDER — LISINOPRIL 20 MG/1
40 TABLET ORAL DAILY
Status: DISCONTINUED | OUTPATIENT
Start: 2018-06-02 | End: 2018-06-04 | Stop reason: HOSPADM

## 2018-06-01 RX ORDER — CHLOROTHIAZIDE 250 MG/1
250 TABLET ORAL EVERY MORNING
Status: ON HOLD | COMMUNITY
End: 2018-06-04 | Stop reason: HOSPADM

## 2018-06-01 RX ORDER — PANTOPRAZOLE SODIUM 40 MG/1
40 TABLET, DELAYED RELEASE ORAL
Status: DISCONTINUED | OUTPATIENT
Start: 2018-06-02 | End: 2018-06-04 | Stop reason: HOSPADM

## 2018-06-01 RX ORDER — BUMETANIDE 1 MG/1
2 TABLET ORAL 2 TIMES DAILY
Status: DISCONTINUED | OUTPATIENT
Start: 2018-06-01 | End: 2018-06-01

## 2018-06-01 RX ORDER — CALCITRIOL 0.25 UG/1
0.25 CAPSULE, LIQUID FILLED ORAL DAILY
Status: DISCONTINUED | OUTPATIENT
Start: 2018-06-01 | End: 2018-06-04 | Stop reason: HOSPADM

## 2018-06-01 RX ORDER — LEVOTHYROXINE SODIUM 0.05 MG/1
50 TABLET ORAL DAILY
Status: DISCONTINUED | OUTPATIENT
Start: 2018-06-01 | End: 2018-06-04 | Stop reason: HOSPADM

## 2018-06-01 RX ORDER — LEVOTHYROXINE SODIUM 0.05 MG/1
1 TABLET ORAL DAILY
Status: CANCELLED | OUTPATIENT
Start: 2018-06-01

## 2018-06-01 RX ORDER — SODIUM CHLORIDE 9 MG/ML
INJECTION, SOLUTION INTRAVENOUS CONTINUOUS
Status: DISCONTINUED | OUTPATIENT
Start: 2018-06-01 | End: 2018-06-01

## 2018-06-01 RX ORDER — LISINOPRIL 20 MG/1
40 TABLET ORAL 2 TIMES DAILY
Status: DISCONTINUED | OUTPATIENT
Start: 2018-06-01 | End: 2018-06-01

## 2018-06-01 RX ORDER — AMLODIPINE BESYLATE 5 MG/1
5 TABLET ORAL NIGHTLY
Status: DISCONTINUED | OUTPATIENT
Start: 2018-06-01 | End: 2018-06-01

## 2018-06-01 RX ORDER — M-VIT,TX,IRON,MINS/CALC/FOLIC 27MG-0.4MG
1 TABLET ORAL DAILY
Status: DISCONTINUED | OUTPATIENT
Start: 2018-06-01 | End: 2018-06-04 | Stop reason: HOSPADM

## 2018-06-01 RX ORDER — SODIUM BICARBONATE 650 MG/1
1300 TABLET ORAL 3 TIMES DAILY
Status: DISCONTINUED | OUTPATIENT
Start: 2018-06-01 | End: 2018-06-01

## 2018-06-01 RX ORDER — SODIUM BICARBONATE 650 MG/1
650 TABLET ORAL 2 TIMES DAILY
Status: DISCONTINUED | OUTPATIENT
Start: 2018-06-01 | End: 2018-06-01

## 2018-06-01 RX ORDER — MYCOPHENOLATE MOFETIL 250 MG/1
500 CAPSULE ORAL 2 TIMES DAILY
Status: DISCONTINUED | OUTPATIENT
Start: 2018-06-01 | End: 2018-06-04 | Stop reason: HOSPADM

## 2018-06-01 RX ORDER — ANTIOX #8/OM3/DHA/EPA/LUT/ZEAX 250-2.5 MG
2 CAPSULE ORAL 2 TIMES DAILY
Status: DISCONTINUED | OUTPATIENT
Start: 2018-06-01 | End: 2018-06-01

## 2018-06-01 RX ORDER — CHLOROTHIAZIDE 250 MG/1
250 TABLET ORAL EVERY MORNING
Status: DISCONTINUED | OUTPATIENT
Start: 2018-06-01 | End: 2018-06-01

## 2018-06-01 RX ADMIN — SODIUM BICARBONATE: 84 INJECTION, SOLUTION INTRAVENOUS at 17:26

## 2018-06-01 RX ADMIN — SODIUM CHLORIDE 1000 ML: 9 INJECTION, SOLUTION INTRAVENOUS at 10:26

## 2018-06-01 RX ADMIN — MYCOPHENOLATE MOFETIL 500 MG: 250 CAPSULE ORAL at 21:57

## 2018-06-01 RX ADMIN — HEPARIN SODIUM 5000 UNITS: 5000 INJECTION, SOLUTION INTRAVENOUS; SUBCUTANEOUS at 17:26

## 2018-06-01 RX ADMIN — BUMETANIDE 2 MG: 1 TABLET ORAL at 13:37

## 2018-06-01 RX ADMIN — CALCITRIOL 0.25 MCG: 0.25 CAPSULE, LIQUID FILLED ORAL at 13:37

## 2018-06-01 RX ADMIN — VITAMIN D, TAB 1000IU (100/BT) 1000 UNITS: 25 TAB at 13:37

## 2018-06-01 RX ADMIN — VITAMIN D, TAB 1000IU (100/BT) 1000 UNITS: 25 TAB at 21:57

## 2018-06-01 RX ADMIN — HEPARIN SODIUM 5000 UNITS: 5000 INJECTION, SOLUTION INTRAVENOUS; SUBCUTANEOUS at 21:57

## 2018-06-01 RX ADMIN — CLONIDINE HYDROCHLORIDE 0.2 MG: 0.1 TABLET ORAL at 13:37

## 2018-06-01 RX ADMIN — MULTIPLE VITAMINS W/ MINERALS TAB 1 TABLET: TAB at 17:26

## 2018-06-01 RX ADMIN — SODIUM BICARBONATE 650 MG: 650 TABLET ORAL at 21:57

## 2018-06-01 ASSESSMENT — ENCOUNTER SYMPTOMS
WHEEZING: 0
DIARRHEA: 0
VOMITING: 0
ABDOMINAL PAIN: 0
CONSTIPATION: 0
NAUSEA: 0
SHORTNESS OF BREATH: 0
CHEST TIGHTNESS: 0

## 2018-06-02 LAB
ABO/RH: NORMAL
ALBUMIN SERPL-MCNC: 3 G/DL (ref 3.5–5.2)
ALP BLD-CCNC: 54 U/L (ref 35–104)
ALT SERPL-CCNC: <5 U/L (ref 5–33)
ANION GAP SERPL CALCULATED.3IONS-SCNC: 16 MMOL/L (ref 7–19)
ANION GAP SERPL CALCULATED.3IONS-SCNC: 17 MMOL/L (ref 7–19)
ANION GAP SERPL CALCULATED.3IONS-SCNC: 19 MMOL/L (ref 7–19)
ANTIBODY SCREEN: NORMAL
AST SERPL-CCNC: 9 U/L (ref 5–32)
BILIRUB SERPL-MCNC: <0.2 MG/DL (ref 0.2–1.2)
BLOOD BANK DISPENSE STATUS: NORMAL
BLOOD BANK PRODUCT CODE: NORMAL
BPU ID: NORMAL
BUN BLDV-MCNC: 53 MG/DL (ref 8–23)
BUN BLDV-MCNC: 55 MG/DL (ref 8–23)
BUN BLDV-MCNC: 57 MG/DL (ref 8–23)
CALCIUM SERPL-MCNC: 8.4 MG/DL (ref 8.8–10.2)
CALCIUM SERPL-MCNC: 8.5 MG/DL (ref 8.8–10.2)
CALCIUM SERPL-MCNC: 8.5 MG/DL (ref 8.8–10.2)
CHLORIDE BLD-SCNC: 85 MMOL/L (ref 98–111)
CHLORIDE BLD-SCNC: 88 MMOL/L (ref 98–111)
CHLORIDE BLD-SCNC: 92 MMOL/L (ref 98–111)
CO2: 17 MMOL/L (ref 22–29)
CO2: 19 MMOL/L (ref 22–29)
CO2: 20 MMOL/L (ref 22–29)
CORTISOL TOTAL: 14.4 UG/DL
CREAT SERPL-MCNC: 2.5 MG/DL (ref 0.5–0.9)
CREAT SERPL-MCNC: 2.7 MG/DL (ref 0.5–0.9)
CREAT SERPL-MCNC: 2.8 MG/DL (ref 0.5–0.9)
DESCRIPTION BLOOD BANK: NORMAL
GFR NON-AFRICAN AMERICAN: 16
GFR NON-AFRICAN AMERICAN: 17
GFR NON-AFRICAN AMERICAN: 19
GLUCOSE BLD-MCNC: 100 MG/DL (ref 74–109)
GLUCOSE BLD-MCNC: 107 MG/DL (ref 70–99)
GLUCOSE BLD-MCNC: 137 MG/DL (ref 70–99)
GLUCOSE BLD-MCNC: 86 MG/DL (ref 74–109)
GLUCOSE BLD-MCNC: 89 MG/DL (ref 74–109)
HBA1C MFR BLD: 5 %
HCT VFR BLD CALC: 20.3 % (ref 37–47)
HEMOGLOBIN: 6.7 G/DL (ref 12–16)
MCH RBC QN AUTO: 30.5 PG (ref 27–31)
MCHC RBC AUTO-ENTMCNC: 33 G/DL (ref 33–37)
MCV RBC AUTO: 92.3 FL (ref 81–99)
PARATHYROID HORMONE INTACT: 63.9 PG/ML (ref 15–65)
PDW BLD-RTO: 14.7 % (ref 11.5–14.5)
PERFORMED ON: ABNORMAL
PERFORMED ON: ABNORMAL
PLATELET # BLD: 243 K/UL (ref 130–400)
PMV BLD AUTO: 10.9 FL (ref 9.4–12.3)
POTASSIUM SERPL-SCNC: 3 MMOL/L (ref 3.5–5)
POTASSIUM SERPL-SCNC: 3.5 MMOL/L (ref 3.5–5)
POTASSIUM SERPL-SCNC: 4.1 MMOL/L (ref 3.5–5)
RBC # BLD: 2.2 M/UL (ref 4.2–5.4)
SODIUM BLD-SCNC: 121 MMOL/L (ref 136–145)
SODIUM BLD-SCNC: 124 MMOL/L (ref 136–145)
SODIUM BLD-SCNC: 128 MMOL/L (ref 136–145)
TOTAL PROTEIN: 5.1 G/DL (ref 6.6–8.7)
WBC # BLD: 3.9 K/UL (ref 4.8–10.8)

## 2018-06-02 PROCEDURE — 86850 RBC ANTIBODY SCREEN: CPT

## 2018-06-02 PROCEDURE — 99233 SBSQ HOSP IP/OBS HIGH 50: CPT | Performed by: HOSPITALIST

## 2018-06-02 PROCEDURE — 2580000003 HC RX 258: Performed by: INTERNAL MEDICINE

## 2018-06-02 PROCEDURE — 2500000003 HC RX 250 WO HCPCS: Performed by: INTERNAL MEDICINE

## 2018-06-02 PROCEDURE — 6370000000 HC RX 637 (ALT 250 FOR IP): Performed by: INTERNAL MEDICINE

## 2018-06-02 PROCEDURE — P9016 RBC LEUKOCYTES REDUCED: HCPCS

## 2018-06-02 PROCEDURE — 6360000002 HC RX W HCPCS: Performed by: INTERNAL MEDICINE

## 2018-06-02 PROCEDURE — 82533 TOTAL CORTISOL: CPT

## 2018-06-02 PROCEDURE — 86901 BLOOD TYPING SEROLOGIC RH(D): CPT

## 2018-06-02 PROCEDURE — 85027 COMPLETE CBC AUTOMATED: CPT

## 2018-06-02 PROCEDURE — 83970 ASSAY OF PARATHORMONE: CPT

## 2018-06-02 PROCEDURE — 36415 COLL VENOUS BLD VENIPUNCTURE: CPT

## 2018-06-02 PROCEDURE — 6370000000 HC RX 637 (ALT 250 FOR IP): Performed by: HOSPITALIST

## 2018-06-02 PROCEDURE — 86900 BLOOD TYPING SEROLOGIC ABO: CPT

## 2018-06-02 PROCEDURE — 82948 REAGENT STRIP/BLOOD GLUCOSE: CPT

## 2018-06-02 PROCEDURE — 80053 COMPREHEN METABOLIC PANEL: CPT

## 2018-06-02 PROCEDURE — 1210000000 HC MED SURG R&B

## 2018-06-02 PROCEDURE — 83036 HEMOGLOBIN GLYCOSYLATED A1C: CPT

## 2018-06-02 PROCEDURE — 6360000002 HC RX W HCPCS: Performed by: HOSPITALIST

## 2018-06-02 PROCEDURE — 36430 TRANSFUSION BLD/BLD COMPNT: CPT

## 2018-06-02 RX ORDER — SODIUM BICARBONATE 650 MG/1
1300 TABLET ORAL 3 TIMES DAILY
Status: DISCONTINUED | OUTPATIENT
Start: 2018-06-02 | End: 2018-06-04 | Stop reason: HOSPADM

## 2018-06-02 RX ORDER — POTASSIUM CHLORIDE 20 MEQ/1
20 TABLET, EXTENDED RELEASE ORAL EVERY 4 HOURS
Status: COMPLETED | OUTPATIENT
Start: 2018-06-02 | End: 2018-06-02

## 2018-06-02 RX ORDER — 0.9 % SODIUM CHLORIDE 0.9 %
250 INTRAVENOUS SOLUTION INTRAVENOUS ONCE
Status: DISCONTINUED | OUTPATIENT
Start: 2018-06-02 | End: 2018-06-04 | Stop reason: HOSPADM

## 2018-06-02 RX ORDER — 0.9 % SODIUM CHLORIDE 0.9 %
250 INTRAVENOUS SOLUTION INTRAVENOUS ONCE
Status: COMPLETED | OUTPATIENT
Start: 2018-06-02 | End: 2018-06-03

## 2018-06-02 RX ADMIN — LEVOTHYROXINE SODIUM 50 MCG: 50 TABLET ORAL at 08:08

## 2018-06-02 RX ADMIN — HEPARIN SODIUM 5000 UNITS: 5000 INJECTION, SOLUTION INTRAVENOUS; SUBCUTANEOUS at 14:16

## 2018-06-02 RX ADMIN — VITAMIN D, TAB 1000IU (100/BT) 1000 UNITS: 25 TAB at 21:17

## 2018-06-02 RX ADMIN — MYCOPHENOLATE MOFETIL 500 MG: 250 CAPSULE ORAL at 21:17

## 2018-06-02 RX ADMIN — MULTIPLE VITAMINS W/ MINERALS TAB 1 TABLET: TAB at 14:15

## 2018-06-02 RX ADMIN — HEPARIN SODIUM 5000 UNITS: 5000 INJECTION, SOLUTION INTRAVENOUS; SUBCUTANEOUS at 06:15

## 2018-06-02 RX ADMIN — SODIUM BICARBONATE 1300 MG: 650 TABLET ORAL at 21:17

## 2018-06-02 RX ADMIN — HEPARIN SODIUM 5000 UNITS: 5000 INJECTION, SOLUTION INTRAVENOUS; SUBCUTANEOUS at 22:05

## 2018-06-02 RX ADMIN — VITAMIN D, TAB 1000IU (100/BT) 1000 UNITS: 25 TAB at 08:08

## 2018-06-02 RX ADMIN — Medication: at 10:53

## 2018-06-02 RX ADMIN — POTASSIUM CHLORIDE 20 MEQ: 20 TABLET, EXTENDED RELEASE ORAL at 08:08

## 2018-06-02 RX ADMIN — SODIUM BICARBONATE 650 MG: 650 TABLET ORAL at 08:08

## 2018-06-02 RX ADMIN — POTASSIUM CHLORIDE 20 MEQ: 20 TABLET, EXTENDED RELEASE ORAL at 14:15

## 2018-06-02 RX ADMIN — SODIUM BICARBONATE 1300 MG: 650 TABLET ORAL at 14:15

## 2018-06-02 RX ADMIN — SODIUM CHLORIDE 250 ML: 9 INJECTION, SOLUTION INTRAVENOUS at 14:09

## 2018-06-02 RX ADMIN — PANTOPRAZOLE SODIUM 40 MG: 40 TABLET, DELAYED RELEASE ORAL at 06:15

## 2018-06-02 RX ADMIN — LISINOPRIL 40 MG: 20 TABLET ORAL at 08:08

## 2018-06-02 RX ADMIN — MYCOPHENOLATE MOFETIL 500 MG: 250 CAPSULE ORAL at 08:08

## 2018-06-02 RX ADMIN — CALCITRIOL 0.25 MCG: 0.25 CAPSULE, LIQUID FILLED ORAL at 08:08

## 2018-06-02 ASSESSMENT — PAIN SCALES - GENERAL: PAINLEVEL_OUTOF10: 0

## 2018-06-03 LAB
ALBUMIN SERPL-MCNC: 2.9 G/DL (ref 3.5–5.2)
ALP BLD-CCNC: 50 U/L (ref 35–104)
ALT SERPL-CCNC: <5 U/L (ref 5–33)
ANION GAP SERPL CALCULATED.3IONS-SCNC: 13 MMOL/L (ref 7–19)
ANION GAP SERPL CALCULATED.3IONS-SCNC: 14 MMOL/L (ref 7–19)
ANION GAP SERPL CALCULATED.3IONS-SCNC: 15 MMOL/L (ref 7–19)
ANION GAP SERPL CALCULATED.3IONS-SCNC: 16 MMOL/L (ref 7–19)
AST SERPL-CCNC: 9 U/L (ref 5–32)
BASOPHILS ABSOLUTE: 0 K/UL (ref 0–0.2)
BASOPHILS RELATIVE PERCENT: 0.5 % (ref 0–1)
BILIRUB SERPL-MCNC: <0.2 MG/DL (ref 0.2–1.2)
BUN BLDV-MCNC: 47 MG/DL (ref 8–23)
BUN BLDV-MCNC: 49 MG/DL (ref 8–23)
BUN BLDV-MCNC: 50 MG/DL (ref 8–23)
BUN BLDV-MCNC: 55 MG/DL (ref 8–23)
CALCIUM SERPL-MCNC: 8 MG/DL (ref 8.8–10.2)
CALCIUM SERPL-MCNC: 8.1 MG/DL (ref 8.8–10.2)
CALCIUM SERPL-MCNC: 8.2 MG/DL (ref 8.8–10.2)
CALCIUM SERPL-MCNC: 8.4 MG/DL (ref 8.8–10.2)
CHLORIDE BLD-SCNC: 100 MMOL/L (ref 98–111)
CHLORIDE BLD-SCNC: 102 MMOL/L (ref 98–111)
CHLORIDE BLD-SCNC: 96 MMOL/L (ref 98–111)
CHLORIDE BLD-SCNC: 99 MMOL/L (ref 98–111)
CO2: 18 MMOL/L (ref 22–29)
CO2: 18 MMOL/L (ref 22–29)
CO2: 19 MMOL/L (ref 22–29)
CO2: 19 MMOL/L (ref 22–29)
CREAT SERPL-MCNC: 2.5 MG/DL (ref 0.5–0.9)
CREAT SERPL-MCNC: 2.6 MG/DL (ref 0.5–0.9)
CREAT SERPL-MCNC: 2.6 MG/DL (ref 0.5–0.9)
CREAT SERPL-MCNC: 2.8 MG/DL (ref 0.5–0.9)
EOSINOPHILS ABSOLUTE: 0 K/UL (ref 0–0.6)
EOSINOPHILS RELATIVE PERCENT: 0.3 % (ref 0–5)
GFR NON-AFRICAN AMERICAN: 16
GFR NON-AFRICAN AMERICAN: 18
GFR NON-AFRICAN AMERICAN: 18
GFR NON-AFRICAN AMERICAN: 19
GLUCOSE BLD-MCNC: 107 MG/DL (ref 74–109)
GLUCOSE BLD-MCNC: 154 MG/DL (ref 74–109)
GLUCOSE BLD-MCNC: 87 MG/DL (ref 74–109)
GLUCOSE BLD-MCNC: 92 MG/DL (ref 74–109)
HCT VFR BLD CALC: 23.3 % (ref 37–47)
HEMOGLOBIN: 7.8 G/DL (ref 12–16)
LYMPHOCYTES ABSOLUTE: 2 K/UL (ref 1.1–4.5)
LYMPHOCYTES RELATIVE PERCENT: 32.2 % (ref 20–40)
MCH RBC QN AUTO: 30.4 PG (ref 27–31)
MCHC RBC AUTO-ENTMCNC: 33.5 G/DL (ref 33–37)
MCV RBC AUTO: 90.7 FL (ref 81–99)
MONOCYTES ABSOLUTE: 0.9 K/UL (ref 0–0.9)
MONOCYTES RELATIVE PERCENT: 14.1 % (ref 0–10)
NEUTROPHILS ABSOLUTE: 3.3 K/UL (ref 1.5–7.5)
NEUTROPHILS RELATIVE PERCENT: 52.4 % (ref 50–65)
PDW BLD-RTO: 16.1 % (ref 11.5–14.5)
PLATELET # BLD: 250 K/UL (ref 130–400)
PMV BLD AUTO: 9.3 FL (ref 9.4–12.3)
POTASSIUM SERPL-SCNC: 4 MMOL/L (ref 3.5–5)
POTASSIUM SERPL-SCNC: 4.2 MMOL/L (ref 3.5–5)
POTASSIUM SERPL-SCNC: 4.2 MMOL/L (ref 3.5–5)
POTASSIUM SERPL-SCNC: 4.8 MMOL/L (ref 3.5–5)
RBC # BLD: 2.57 M/UL (ref 4.2–5.4)
SODIUM BLD-SCNC: 129 MMOL/L (ref 136–145)
SODIUM BLD-SCNC: 132 MMOL/L (ref 136–145)
SODIUM BLD-SCNC: 132 MMOL/L (ref 136–145)
SODIUM BLD-SCNC: 136 MMOL/L (ref 136–145)
TOTAL PROTEIN: 4.9 G/DL (ref 6.6–8.7)
WBC # BLD: 6.3 K/UL (ref 4.8–10.8)

## 2018-06-03 PROCEDURE — 99232 SBSQ HOSP IP/OBS MODERATE 35: CPT | Performed by: HOSPITALIST

## 2018-06-03 PROCEDURE — 2580000003 HC RX 258: Performed by: INTERNAL MEDICINE

## 2018-06-03 PROCEDURE — 6370000000 HC RX 637 (ALT 250 FOR IP): Performed by: HOSPITALIST

## 2018-06-03 PROCEDURE — 36415 COLL VENOUS BLD VENIPUNCTURE: CPT

## 2018-06-03 PROCEDURE — 6370000000 HC RX 637 (ALT 250 FOR IP): Performed by: INTERNAL MEDICINE

## 2018-06-03 PROCEDURE — 85025 COMPLETE CBC W/AUTO DIFF WBC: CPT

## 2018-06-03 PROCEDURE — 6360000002 HC RX W HCPCS: Performed by: INTERNAL MEDICINE

## 2018-06-03 PROCEDURE — 6360000002 HC RX W HCPCS: Performed by: HOSPITALIST

## 2018-06-03 PROCEDURE — 80053 COMPREHEN METABOLIC PANEL: CPT

## 2018-06-03 PROCEDURE — 1210000000 HC MED SURG R&B

## 2018-06-03 RX ADMIN — LEVOTHYROXINE SODIUM 50 MCG: 50 TABLET ORAL at 08:10

## 2018-06-03 RX ADMIN — SODIUM BICARBONATE 1300 MG: 650 TABLET ORAL at 08:10

## 2018-06-03 RX ADMIN — LISINOPRIL 40 MG: 20 TABLET ORAL at 08:10

## 2018-06-03 RX ADMIN — SODIUM BICARBONATE 1300 MG: 650 TABLET ORAL at 20:29

## 2018-06-03 RX ADMIN — CALCITRIOL 0.25 MCG: 0.25 CAPSULE, LIQUID FILLED ORAL at 08:10

## 2018-06-03 RX ADMIN — VITAMIN D, TAB 1000IU (100/BT) 1000 UNITS: 25 TAB at 20:29

## 2018-06-03 RX ADMIN — MYCOPHENOLATE MOFETIL 500 MG: 250 CAPSULE ORAL at 20:29

## 2018-06-03 RX ADMIN — HEPARIN SODIUM 5000 UNITS: 5000 INJECTION, SOLUTION INTRAVENOUS; SUBCUTANEOUS at 05:45

## 2018-06-03 RX ADMIN — VITAMIN D, TAB 1000IU (100/BT) 1000 UNITS: 25 TAB at 08:10

## 2018-06-03 RX ADMIN — HEPARIN SODIUM 5000 UNITS: 5000 INJECTION, SOLUTION INTRAVENOUS; SUBCUTANEOUS at 22:13

## 2018-06-03 RX ADMIN — SODIUM BICARBONATE 1300 MG: 650 TABLET ORAL at 13:01

## 2018-06-03 RX ADMIN — Medication: at 13:01

## 2018-06-03 RX ADMIN — MULTIPLE VITAMINS W/ MINERALS TAB 1 TABLET: TAB at 14:18

## 2018-06-03 RX ADMIN — PANTOPRAZOLE SODIUM 40 MG: 40 TABLET, DELAYED RELEASE ORAL at 05:45

## 2018-06-03 RX ADMIN — MYCOPHENOLATE MOFETIL 500 MG: 250 CAPSULE ORAL at 08:10

## 2018-06-03 RX ADMIN — HEPARIN SODIUM 5000 UNITS: 5000 INJECTION, SOLUTION INTRAVENOUS; SUBCUTANEOUS at 14:18

## 2018-06-03 RX ADMIN — Medication: at 02:31

## 2018-06-03 ASSESSMENT — PAIN SCALES - GENERAL
PAINLEVEL_OUTOF10: 0

## 2018-06-04 VITALS
HEIGHT: 62 IN | RESPIRATION RATE: 18 BRPM | TEMPERATURE: 97.7 F | HEART RATE: 86 BPM | WEIGHT: 125 LBS | BODY MASS INDEX: 23 KG/M2 | DIASTOLIC BLOOD PRESSURE: 75 MMHG | OXYGEN SATURATION: 98 % | SYSTOLIC BLOOD PRESSURE: 158 MMHG

## 2018-06-04 LAB
ALBUMIN SERPL-MCNC: 2.8 G/DL (ref 3.5–5.2)
ALBUMIN SERPL-MCNC: 2.88 G/DL (ref 3.75–5.01)
ALP BLD-CCNC: 46 U/L (ref 35–104)
ALPHA-1-GLOBULIN: 0.27 G/DL (ref 0.19–0.46)
ALPHA-2-GLOBULIN: 0.85 G/DL (ref 0.48–1.05)
ALT SERPL-CCNC: <5 U/L (ref 5–33)
ANION GAP SERPL CALCULATED.3IONS-SCNC: 13 MMOL/L (ref 7–19)
ANION GAP SERPL CALCULATED.3IONS-SCNC: 13 MMOL/L (ref 7–19)
AST SERPL-CCNC: 9 U/L (ref 5–32)
BASOPHILS ABSOLUTE: 0.1 K/UL (ref 0–0.2)
BASOPHILS RELATIVE PERCENT: 1 % (ref 0–1)
BETA GLOBULIN: 0.52 G/DL (ref 0.48–1.1)
BILIRUB SERPL-MCNC: <0.2 MG/DL (ref 0.2–1.2)
BUN BLDV-MCNC: 41 MG/DL (ref 8–23)
BUN BLDV-MCNC: 46 MG/DL (ref 8–23)
CALCIUM SERPL-MCNC: 8 MG/DL (ref 8.8–10.2)
CALCIUM SERPL-MCNC: 8.3 MG/DL (ref 8.8–10.2)
CHLORIDE BLD-SCNC: 102 MMOL/L (ref 98–111)
CHLORIDE BLD-SCNC: 105 MMOL/L (ref 98–111)
CO2: 18 MMOL/L (ref 22–29)
CO2: 19 MMOL/L (ref 22–29)
CREAT SERPL-MCNC: 2.2 MG/DL (ref 0.5–0.9)
CREAT SERPL-MCNC: 2.4 MG/DL (ref 0.5–0.9)
EOSINOPHILS ABSOLUTE: 0 K/UL (ref 0–0.6)
EOSINOPHILS RELATIVE PERCENT: 0.4 % (ref 0–5)
GAMMA GLOBULIN: 0.5 G/DL (ref 0.62–1.51)
GFR NON-AFRICAN AMERICAN: 19
GFR NON-AFRICAN AMERICAN: 22
GLUCOSE BLD-MCNC: 89 MG/DL (ref 74–109)
GLUCOSE BLD-MCNC: 94 MG/DL (ref 74–109)
HCT VFR BLD CALC: 25.2 % (ref 37–47)
HEMOGLOBIN: 7.7 G/DL (ref 12–16)
LYMPHOCYTES ABSOLUTE: 2 K/UL (ref 1.1–4.5)
LYMPHOCYTES RELATIVE PERCENT: 28.8 % (ref 20–40)
MCH RBC QN AUTO: 29.5 PG (ref 27–31)
MCHC RBC AUTO-ENTMCNC: 30.6 G/DL (ref 33–37)
MCV RBC AUTO: 96.6 FL (ref 81–99)
MONOCYTES ABSOLUTE: 1 K/UL (ref 0–0.9)
MONOCYTES RELATIVE PERCENT: 14.6 % (ref 0–10)
NEUTROPHILS ABSOLUTE: 3.8 K/UL (ref 1.5–7.5)
NEUTROPHILS RELATIVE PERCENT: 54.8 % (ref 50–65)
PDW BLD-RTO: 16.9 % (ref 11.5–14.5)
PLATELET # BLD: 272 K/UL (ref 130–400)
PMV BLD AUTO: 9.6 FL (ref 9.4–12.3)
POTASSIUM SERPL-SCNC: 4.3 MMOL/L (ref 3.5–5)
POTASSIUM SERPL-SCNC: 4.3 MMOL/L (ref 3.5–5)
PROTEIN ELECTROPHORESIS, SERUM: ABNORMAL
RBC # BLD: 2.61 M/UL (ref 4.2–5.4)
SODIUM BLD-SCNC: 134 MMOL/L (ref 136–145)
SODIUM BLD-SCNC: 136 MMOL/L (ref 136–145)
SPE/IFE INTERPRETATION: ABNORMAL
TOTAL PROTEIN: 4.9 G/DL (ref 6.6–8.7)
TOTAL PROTEIN: 5 G/DL (ref 6–8.3)
WBC # BLD: 6.9 K/UL (ref 4.8–10.8)

## 2018-06-04 PROCEDURE — 85025 COMPLETE CBC W/AUTO DIFF WBC: CPT

## 2018-06-04 PROCEDURE — 6370000000 HC RX 637 (ALT 250 FOR IP): Performed by: INTERNAL MEDICINE

## 2018-06-04 PROCEDURE — 6360000002 HC RX W HCPCS: Performed by: INTERNAL MEDICINE

## 2018-06-04 PROCEDURE — 36415 COLL VENOUS BLD VENIPUNCTURE: CPT

## 2018-06-04 PROCEDURE — 99239 HOSP IP/OBS DSCHRG MGMT >30: CPT | Performed by: HOSPITALIST

## 2018-06-04 PROCEDURE — 6370000000 HC RX 637 (ALT 250 FOR IP): Performed by: HOSPITALIST

## 2018-06-04 PROCEDURE — 80053 COMPREHEN METABOLIC PANEL: CPT

## 2018-06-04 PROCEDURE — 6360000002 HC RX W HCPCS: Performed by: HOSPITALIST

## 2018-06-04 RX ORDER — AMLODIPINE BESYLATE 5 MG/1
5 TABLET ORAL DAILY
Status: DISCONTINUED | OUTPATIENT
Start: 2018-06-04 | End: 2018-06-04 | Stop reason: HOSPADM

## 2018-06-04 RX ORDER — LISINOPRIL 20 MG/1
40 TABLET ORAL DAILY
Qty: 30 TABLET | Refills: 0 | Status: SHIPPED | OUTPATIENT
Start: 2018-06-04 | End: 2018-06-04

## 2018-06-04 RX ORDER — CLONIDINE HYDROCHLORIDE 0.1 MG/1
0.1 TABLET ORAL 2 TIMES DAILY
Qty: 60 TABLET | Refills: 0 | Status: ON HOLD | OUTPATIENT
Start: 2018-06-04 | End: 2018-09-16 | Stop reason: HOSPADM

## 2018-06-04 RX ORDER — CLONIDINE HYDROCHLORIDE 0.1 MG/1
0.1 TABLET ORAL 2 TIMES DAILY
Status: DISCONTINUED | OUTPATIENT
Start: 2018-06-04 | End: 2018-06-04 | Stop reason: HOSPADM

## 2018-06-04 RX ORDER — LISINOPRIL 20 MG/1
20 TABLET ORAL DAILY
Qty: 30 TABLET | Refills: 0 | DISCHARGE
Start: 2018-06-04 | End: 2019-06-18 | Stop reason: DRUGHIGH

## 2018-06-04 RX ORDER — FUROSEMIDE 20 MG/1
20 TABLET ORAL DAILY
Qty: 60 TABLET | Refills: 0 | Status: ON HOLD | OUTPATIENT
Start: 2018-06-04 | End: 2018-09-13 | Stop reason: ALTCHOICE

## 2018-06-04 RX ORDER — SODIUM CHLORIDE AND POTASSIUM CHLORIDE .9; .15 G/100ML; G/100ML
SOLUTION INTRAVENOUS CONTINUOUS
Status: DISCONTINUED | OUTPATIENT
Start: 2018-06-04 | End: 2018-06-04

## 2018-06-04 RX ORDER — FUROSEMIDE 20 MG/1
20 TABLET ORAL DAILY
Status: DISCONTINUED | OUTPATIENT
Start: 2018-06-04 | End: 2018-06-04 | Stop reason: HOSPADM

## 2018-06-04 RX ADMIN — FUROSEMIDE 20 MG: 20 TABLET ORAL at 09:59

## 2018-06-04 RX ADMIN — LEVOTHYROXINE SODIUM 50 MCG: 50 TABLET ORAL at 09:58

## 2018-06-04 RX ADMIN — HEPARIN SODIUM 5000 UNITS: 5000 INJECTION, SOLUTION INTRAVENOUS; SUBCUTANEOUS at 04:51

## 2018-06-04 RX ADMIN — PANTOPRAZOLE SODIUM 40 MG: 40 TABLET, DELAYED RELEASE ORAL at 05:04

## 2018-06-04 RX ADMIN — CLONIDINE HYDROCHLORIDE 0.1 MG: 0.1 TABLET ORAL at 09:59

## 2018-06-04 RX ADMIN — POTASSIUM CHLORIDE AND SODIUM CHLORIDE: 900; 150 INJECTION, SOLUTION INTRAVENOUS at 04:51

## 2018-06-04 RX ADMIN — CALCITRIOL 0.25 MCG: 0.25 CAPSULE, LIQUID FILLED ORAL at 09:59

## 2018-06-04 RX ADMIN — VITAMIN D, TAB 1000IU (100/BT) 1000 UNITS: 25 TAB at 09:59

## 2018-06-04 RX ADMIN — LISINOPRIL 40 MG: 20 TABLET ORAL at 09:59

## 2018-06-04 RX ADMIN — MYCOPHENOLATE MOFETIL 500 MG: 250 CAPSULE ORAL at 09:59

## 2018-06-04 RX ADMIN — AMLODIPINE BESYLATE 5 MG: 5 TABLET ORAL at 09:59

## 2018-06-04 RX ADMIN — SODIUM BICARBONATE 1300 MG: 650 TABLET ORAL at 09:58

## 2018-06-04 ASSESSMENT — PAIN SCALES - GENERAL
PAINLEVEL_OUTOF10: 0
PAINLEVEL_OUTOF10: 0

## 2018-06-05 ENCOUNTER — CARE COORDINATION (OUTPATIENT)
Dept: CASE MANAGEMENT | Age: 78
End: 2018-06-05

## 2018-06-05 DIAGNOSIS — N05.5 MEMBRANOUS PROLIFERATIVE GLOMERULONEPHRITIS: Primary | ICD-10-CM

## 2018-06-05 PROCEDURE — 1111F DSCHRG MED/CURRENT MED MERGE: CPT | Performed by: INTERNAL MEDICINE

## 2018-06-06 DIAGNOSIS — I13.10 BENIGN HYPERTENSIVE HEART AND RENAL DISEASE: Primary | ICD-10-CM

## 2018-06-08 ENCOUNTER — CARE COORDINATION (OUTPATIENT)
Dept: CASE MANAGEMENT | Age: 78
End: 2018-06-08

## 2018-06-11 ENCOUNTER — CARE COORDINATION (OUTPATIENT)
Dept: CASE MANAGEMENT | Age: 78
End: 2018-06-11

## 2018-06-12 ENCOUNTER — APPOINTMENT (OUTPATIENT)
Dept: LAB | Facility: HOSPITAL | Age: 78
End: 2018-06-12

## 2018-06-12 ENCOUNTER — OFFICE VISIT (OUTPATIENT)
Dept: ONCOLOGY | Facility: CLINIC | Age: 78
End: 2018-06-12

## 2018-06-12 VITALS
HEIGHT: 62 IN | RESPIRATION RATE: 18 BRPM | TEMPERATURE: 97.9 F | WEIGHT: 131.1 LBS | OXYGEN SATURATION: 97 % | DIASTOLIC BLOOD PRESSURE: 70 MMHG | SYSTOLIC BLOOD PRESSURE: 130 MMHG | HEART RATE: 87 BPM | BODY MASS INDEX: 24.13 KG/M2

## 2018-06-12 DIAGNOSIS — N18.30 ANEMIA OF CHRONIC RENAL FAILURE, STAGE 3 (MODERATE) (HCC): Primary | ICD-10-CM

## 2018-06-12 DIAGNOSIS — D63.1 ANEMIA OF CHRONIC RENAL FAILURE, STAGE 3 (MODERATE) (HCC): Primary | ICD-10-CM

## 2018-06-12 LAB
ALBUMIN SERPL-MCNC: 3.9 G/DL (ref 3.5–5)
ALBUMIN/GLOB SERPL: 1.6 G/DL (ref 1.1–2.5)
ALP SERPL-CCNC: 63 U/L (ref 24–120)
ALT SERPL W P-5'-P-CCNC: 16 U/L (ref 0–54)
ANION GAP SERPL CALCULATED.3IONS-SCNC: 13 MMOL/L (ref 4–13)
AST SERPL-CCNC: 22 U/L (ref 7–45)
BASOPHILS # BLD AUTO: 0.04 10*3/MM3 (ref 0–0.2)
BASOPHILS NFR BLD AUTO: 0.6 % (ref 0–2)
BILIRUB SERPL-MCNC: 0.3 MG/DL (ref 0.1–1)
BUN BLD-MCNC: 43 MG/DL (ref 5–21)
BUN/CREAT SERPL: 15.4 (ref 7–25)
CALCIUM SPEC-SCNC: 9.6 MG/DL (ref 8.4–10.4)
CHLORIDE SERPL-SCNC: 105 MMOL/L (ref 98–110)
CO2 SERPL-SCNC: 19 MMOL/L (ref 24–31)
CREAT BLD-MCNC: 2.79 MG/DL (ref 0.5–1.4)
DEPRECATED RDW RBC AUTO: 62.8 FL (ref 40–54)
EOSINOPHIL # BLD AUTO: 0.01 10*3/MM3 (ref 0–0.7)
EOSINOPHIL NFR BLD AUTO: 0.2 % (ref 0–4)
ERYTHROCYTE [DISTWIDTH] IN BLOOD BY AUTOMATED COUNT: 17.8 % (ref 12–15)
GFR SERPL CREATININE-BSD FRML MDRD: 16 ML/MIN/1.73
GLOBULIN UR ELPH-MCNC: 2.5 GM/DL
GLUCOSE BLD-MCNC: 105 MG/DL (ref 70–100)
HCT VFR BLD AUTO: 30.4 % (ref 37–47)
HGB BLD-MCNC: 9.4 G/DL (ref 12–16)
HOLD SPECIMEN: NORMAL
HOLD SPECIMEN: NORMAL
IMM GRANULOCYTES # BLD: 0.02 10*3/MM3 (ref 0–0.03)
IMM GRANULOCYTES NFR BLD: 0.3 % (ref 0–5)
LYMPHOCYTES # BLD AUTO: 2.09 10*3/MM3 (ref 0.72–4.86)
LYMPHOCYTES NFR BLD AUTO: 31.9 % (ref 15–45)
MCH RBC QN AUTO: 29.8 PG (ref 28–32)
MCHC RBC AUTO-ENTMCNC: 30.9 G/DL (ref 33–36)
MCV RBC AUTO: 96.5 FL (ref 82–98)
MONOCYTES # BLD AUTO: 0.68 10*3/MM3 (ref 0.19–1.3)
MONOCYTES NFR BLD AUTO: 10.4 % (ref 4–12)
NEUTROPHILS # BLD AUTO: 3.71 10*3/MM3 (ref 1.87–8.4)
NEUTROPHILS NFR BLD AUTO: 56.6 % (ref 39–78)
NRBC BLD MANUAL-RTO: 0 /100 WBC (ref 0–0)
PLATELET # BLD AUTO: 261 10*3/MM3 (ref 130–400)
PMV BLD AUTO: 9.7 FL (ref 6–12)
POTASSIUM BLD-SCNC: 3.5 MMOL/L (ref 3.5–5.3)
PROT SERPL-MCNC: 6.4 G/DL (ref 6.3–8.7)
RBC # BLD AUTO: 3.15 10*6/MM3 (ref 4.2–5.4)
SODIUM BLD-SCNC: 137 MMOL/L (ref 135–145)
WBC NRBC COR # BLD: 6.55 10*3/MM3 (ref 4.8–10.8)

## 2018-06-12 PROCEDURE — 85025 COMPLETE CBC W/AUTO DIFF WBC: CPT | Performed by: INTERNAL MEDICINE

## 2018-06-12 PROCEDURE — 80053 COMPREHEN METABOLIC PANEL: CPT | Performed by: INTERNAL MEDICINE

## 2018-06-12 PROCEDURE — 99214 OFFICE O/P EST MOD 30 MIN: CPT | Performed by: INTERNAL MEDICINE

## 2018-06-12 PROCEDURE — 36415 COLL VENOUS BLD VENIPUNCTURE: CPT

## 2018-06-12 RX ORDER — AMLODIPINE BESYLATE 5 MG/1
5 TABLET ORAL DAILY
COMMUNITY
End: 2018-10-02

## 2018-06-12 NOTE — PROGRESS NOTES
Patient ID:   Susanne Valentine is a 78 y.o. female.  Referring Physician:   No referring provider defined for this encounter.  Primary Care Provider:  Saman Castorena DO    Assessment/Plan:  Assessment   Patient Active Problem List   Diagnosis   • Anemia of chronic kidney failure   • Anemia of chronic renal failure, stage 3 (moderate)   • B12 deficiency     Cancer Staging Information:  No matching staging information was found for the patient.    Susanne was seen today for follow-up.    Diagnoses and all orders for this visit:    Anemia of chronic renal failure, stage 3 (moderate)      The patient has a stable anemia from her chronic kidney disease, with the use of Procrit once a month as necessary.  We will hold the treatment today, and have her come back in 1 month with a repeat CBC, we will proceed with Procrit at 40,000 units subcutaneously if her hemoglobin is less than 10 g/DL.  Patient ID:   Susanne Valentine is a 78 y.o. female.  Referring Physician:   No referring provider defined for this encounter.  Primary Care Provider:  Saman Castorena DO    Assessment/Plan:  Assessment   Patient Active Problem List   Diagnosis   • Anemia of chronic kidney failure   • Anemia of chronic renal failure, stage 3 (moderate)   • B12 deficiency     Cancer Staging Information:  No matching staging information was found for the patient.    Susanne was seen today for follow-up.    Diagnoses and all orders for this visit:    Anemia of chronic renal failure, stage 3 (moderate)      The patient has a stable anemia from her chronic kidney disease, with the use of Procrit once a month as necessary.  We will hold the treatment today, and have her come back in 1 month with a repeat CBC, we will proceed with Procrit at 40,000 units subcutaneously if her hemoglobin is less than 10 g/DL.     Interval History:  The patient is here for follow-up and for possible consideration for Procrit.  She has no new complaints at this time and has  "been doing well.  Apparently her   5 years ago, and she reminded me that I am seeing her in the past when I was still here.  She did not get any Procrit on her last visit    Interval Notes:  The following portions of the patient's history were reviewed and updated as appropriate: allergies, current medications, past family history, past medical history, past social history, past surgical history and problem list.     History of Present Illness   76-year-old white lady with chronic kidney disease has an anemia and has been on ESAs since . She has been tolerating it well and her anemia has been stable with a supplement.    Review of Systems   Constitutional: Negative.    HENT: Negative.    Eyes: Negative.    Respiratory: Negative.    Cardiovascular: Negative.    Gastrointestinal: Negative.    Endocrine: Negative.    Genitourinary: Negative.    Musculoskeletal: Negative.    Allergic/Immunologic: Negative.    Neurological: Negative.    Hematological: Negative.    Psychiatric/Behavioral: Negative.         Physical Exam:  Vital Signs for this encounter:  BSA: 1.6 meters squared  /70   Pulse 87   Temp 97.9 °F (36.6 °C) (Tympanic)   Resp 18   Ht 157.5 cm (62\")   Wt 59.5 kg (131 lb 1.6 oz)   SpO2 97%   BMI 23.98 kg/m²     Physical Exam   Constitutional: She is oriented to person, place, and time. She appears well-developed and well-nourished. No distress.   HENT:   Head: Normocephalic and atraumatic.   Nose: Nose normal.   Mouth/Throat: Oropharynx is clear and moist. No oropharyngeal exudate.   Eyes: Conjunctivae and EOM are normal. Pupils are equal, round, and reactive to light. No scleral icterus.   Neck: Normal range of motion. Neck supple. No JVD present. No thyromegaly present.   Cardiovascular: Normal rate, regular rhythm and normal heart sounds.  Exam reveals no gallop and no friction rub.    Pulmonary/Chest: Effort normal and breath sounds normal. No respiratory distress. She has no " wheezes. She has no rales. She exhibits no tenderness.   Abdominal: Soft. Bowel sounds are normal. She exhibits no distension. There is no tenderness. There is no guarding.   Musculoskeletal: Normal range of motion. She exhibits no edema or tenderness.   Lymphadenopathy:     She has no cervical adenopathy.   Neurological: She is alert and oriented to person, place, and time. No cranial nerve deficit.   Skin: Skin is warm and dry.   Few purpuric spots, forearms   Psychiatric: She has a normal mood and affect. Her behavior is normal. Judgment and thought content normal.       Performance Status:  Asymptomatic    Results:  WBC   Date Value Ref Range Status   2018 6.55 4.80 - 10.80 10*3/mm3 Final     Hemoglobin   Date Value Ref Range Status   2018 9.4 (L) 12.0 - 16.0 g/dL Final     Hematocrit   Date Value Ref Range Status   2018 30.4 (L) 37.0 - 47.0 % Final     Platelets   Date Value Ref Range Status   2018 261 130 - 400 10*3/mm3 Final     Creatinine   Date Value Ref Range Status   2018 2.79 (H) 0.50 - 1.40 mg/dL Final     AST (SGOT)   Date Value Ref Range Status   2018 22 7 - 45 U/L Final        Interval History:  The patient is here for follow-up and for possible consideration for Procrit.  She has no new complaints at this time and has been doing well.  Apparently her   5 years ago, and she reminded me that I am seeing her in the past when I was still here.  She did not get any Procrit on her last visit    Interval Notes:  The following portions of the patient's history were reviewed and updated as appropriate: allergies, current medications, past family history, past medical history, past social history, past surgical history and problem list.     History of Present Illness   76-year-old white lady with chronic kidney disease has an anemia and has been on ESAs since . She has been tolerating it well and her anemia has been stable with a supplement.    Review of  "Systems   Constitutional: Negative.    HENT: Negative.    Eyes: Negative.    Respiratory: Negative.    Cardiovascular: Negative.    Gastrointestinal: Negative.    Endocrine: Negative.    Genitourinary: Negative.    Musculoskeletal: Negative.    Allergic/Immunologic: Negative.    Neurological: Negative.    Hematological: Negative.    Psychiatric/Behavioral: Negative.         Physical Exam:  Vital Signs for this encounter:  BSA: 1.6 meters squared  /70   Pulse 87   Temp 97.9 °F (36.6 °C) (Tympanic)   Resp 18   Ht 157.5 cm (62\")   Wt 59.5 kg (131 lb 1.6 oz)   SpO2 97%   BMI 23.98 kg/m²     Physical Exam   Constitutional: She is oriented to person, place, and time. She appears well-developed and well-nourished. No distress.   HENT:   Head: Normocephalic and atraumatic.   Nose: Nose normal.   Mouth/Throat: Oropharynx is clear and moist. No oropharyngeal exudate.   Eyes: Conjunctivae and EOM are normal. Pupils are equal, round, and reactive to light. No scleral icterus.   Neck: Normal range of motion. Neck supple. No JVD present. No thyromegaly present.   Cardiovascular: Normal rate, regular rhythm and normal heart sounds.  Exam reveals no gallop and no friction rub.    Pulmonary/Chest: Effort normal and breath sounds normal. No respiratory distress. She has no wheezes. She has no rales. She exhibits no tenderness.   Abdominal: Soft. Bowel sounds are normal. She exhibits no distension. There is no tenderness. There is no guarding.   Musculoskeletal: Normal range of motion. She exhibits no edema or tenderness.   Lymphadenopathy:     She has no cervical adenopathy.   Neurological: She is alert and oriented to person, place, and time. No cranial nerve deficit.   Skin: Skin is warm and dry.   Few purpuric spots, forearms   Psychiatric: She has a normal mood and affect. Her behavior is normal. Judgment and thought content normal.       Performance Status:  Asymptomatic    Results:  WBC   Date Value Ref Range " Status   06/12/2018 6.55 4.80 - 10.80 10*3/mm3 Final     Hemoglobin   Date Value Ref Range Status   06/12/2018 9.4 (L) 12.0 - 16.0 g/dL Final     Hematocrit   Date Value Ref Range Status   06/12/2018 30.4 (L) 37.0 - 47.0 % Final     Platelets   Date Value Ref Range Status   06/12/2018 261 130 - 400 10*3/mm3 Final     Creatinine   Date Value Ref Range Status   06/12/2018 2.79 (H) 0.50 - 1.40 mg/dL Final     AST (SGOT)   Date Value Ref Range Status   06/12/2018 22 7 - 45 U/L Final     Anemia of chronic kidney disease.  Hemoglobin today is 10.4gm%,  continue Procrit.  Patient clinically asymptomatic.  Over the last 3 months for MCV of, therefore I am would check vitamin B12 and folic acid on today's draw and I have asked her to start taking oral folic acid 400 µg daily as well as start receiving vitamin B12 1000 µg subcutaneous every 4 weekly.  If the MCV remains high in another 3-4 months I may consider doing a bone marrow biopsy to diagnose myelodysplasia.  The last colonoscopy was done within the last 5 years and a verbal report given to me by the patient it was negative.    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 I am not sure if ACTHER has help improve her Hgb 11.1gm%today.  Any way per CMS guidelines, if the Hg 7.4 therefore Procrit today. Will draw Iron studies today. Clinically doing well, says she worked in the yard over the weekend. ACTH is stopped by Nephrology Svx. Recheck CBC Wed, if Iron is low will infuse Iron, however, if Hgb less then 8gm, will xfuse PRBC ( apparently she bled from Heammorhoids ).    Repeat Hgb today 7.6, however, pt clinically asymptomatic, therefore, will OBSERVE and recheck CBC again on Monday. Meanwhile check stools for Ob.  Toady Hgb still low at 7.4gm%, however pt is doing fine clinically asymptomatic. I believe she is dilutional from Volume overload 3rd space  fluid from ACHTAR that was Rx and stopped last month.  Since she is asymptomatic, will OBSERVE and redo her CBC next week. However, I have advised her that my cuit off for transfusion is Hgb 7gm%.    1/3 stool cards +OB. Hgb today 7.2 however, pt is completely asymptomatic and able to preform ADLs. Will increase dose Procrit to 74382 units due May 29th.  Meanwhile check Hgb wkly.    Creatinine increased to 3.09, with worsening renal failure, there is decreasing Hgb. If the Hgb is less then 8gm% next week, then the plan is to increase the Procrit to 60,000 Units every month STARTING TODAY.    She was inhouse 5 days ago with electrolyte imbalance from fluid overload, causing dilutional Hyponatremia and dilutional anemia. Apparently she received IV diuretics and was D/cd on fluid restriction. In house she also received 1 U PRBC. Therefore, today her Hgb is above 9.4. I am therefore, contemplating to bring back the Procrit dose to 40,000 units. Will make a decision last week of June when she will be due for her Procrit inj.

## 2018-06-25 ENCOUNTER — LAB (OUTPATIENT)
Dept: LAB | Facility: HOSPITAL | Age: 78
End: 2018-06-25
Attending: INTERNAL MEDICINE

## 2018-06-25 ENCOUNTER — INFUSION (OUTPATIENT)
Dept: ONCOLOGY | Facility: HOSPITAL | Age: 78
End: 2018-06-25
Attending: INTERNAL MEDICINE

## 2018-06-25 ENCOUNTER — OFFICE VISIT (OUTPATIENT)
Dept: ONCOLOGY | Facility: CLINIC | Age: 78
End: 2018-06-25

## 2018-06-25 VITALS
TEMPERATURE: 97.9 F | WEIGHT: 132.6 LBS | SYSTOLIC BLOOD PRESSURE: 118 MMHG | HEART RATE: 88 BPM | OXYGEN SATURATION: 92 % | HEIGHT: 62 IN | BODY MASS INDEX: 24.4 KG/M2 | RESPIRATION RATE: 16 BRPM | DIASTOLIC BLOOD PRESSURE: 72 MMHG

## 2018-06-25 VITALS
SYSTOLIC BLOOD PRESSURE: 150 MMHG | RESPIRATION RATE: 20 BRPM | HEART RATE: 90 BPM | HEIGHT: 62 IN | BODY MASS INDEX: 24.29 KG/M2 | WEIGHT: 132 LBS | OXYGEN SATURATION: 100 % | TEMPERATURE: 97.7 F | DIASTOLIC BLOOD PRESSURE: 80 MMHG

## 2018-06-25 DIAGNOSIS — N18.30 ANEMIA OF CHRONIC RENAL FAILURE, STAGE 3 (MODERATE) (HCC): ICD-10-CM

## 2018-06-25 DIAGNOSIS — N18.30 ANEMIA OF CHRONIC RENAL FAILURE, STAGE 3 (MODERATE) (HCC): Primary | ICD-10-CM

## 2018-06-25 DIAGNOSIS — D63.1 ANEMIA OF CHRONIC KIDNEY FAILURE, STAGE 3 (MODERATE) (HCC): ICD-10-CM

## 2018-06-25 DIAGNOSIS — D63.1 ANEMIA OF CHRONIC RENAL FAILURE, STAGE 3 (MODERATE) (HCC): ICD-10-CM

## 2018-06-25 DIAGNOSIS — N18.30 ANEMIA OF CHRONIC KIDNEY FAILURE, STAGE 3 (MODERATE) (HCC): ICD-10-CM

## 2018-06-25 DIAGNOSIS — D63.1 ANEMIA OF CHRONIC RENAL FAILURE, STAGE 3 (MODERATE) (HCC): Primary | ICD-10-CM

## 2018-06-25 LAB
ALBUMIN SERPL-MCNC: 3.6 G/DL (ref 3.5–5)
ALBUMIN/GLOB SERPL: 1.3 G/DL (ref 1.1–2.5)
ALP SERPL-CCNC: 76 U/L (ref 24–120)
ALT SERPL W P-5'-P-CCNC: 16 U/L (ref 0–54)
ANION GAP SERPL CALCULATED.3IONS-SCNC: 16 MMOL/L (ref 4–13)
AST SERPL-CCNC: 23 U/L (ref 7–45)
BASOPHILS # BLD AUTO: 0.03 10*3/MM3 (ref 0–0.2)
BASOPHILS NFR BLD AUTO: 0.5 % (ref 0–2)
BILIRUB SERPL-MCNC: 0.1 MG/DL (ref 0.1–1)
BUN BLD-MCNC: 42 MG/DL (ref 5–21)
BUN/CREAT SERPL: 15.5 (ref 7–25)
CALCIUM SPEC-SCNC: 9.1 MG/DL (ref 8.4–10.4)
CHLORIDE SERPL-SCNC: 105 MMOL/L (ref 98–110)
CO2 SERPL-SCNC: 19 MMOL/L (ref 24–31)
CREAT BLD-MCNC: 2.71 MG/DL (ref 0.5–1.4)
DEPRECATED RDW RBC AUTO: 57.1 FL (ref 40–54)
EOSINOPHIL # BLD AUTO: 0.03 10*3/MM3 (ref 0–0.7)
EOSINOPHIL NFR BLD AUTO: 0.5 % (ref 0–4)
ERYTHROCYTE [DISTWIDTH] IN BLOOD BY AUTOMATED COUNT: 16 % (ref 12–15)
GFR SERPL CREATININE-BSD FRML MDRD: 17 ML/MIN/1.73
GLOBULIN UR ELPH-MCNC: 2.7 GM/DL
GLUCOSE BLD-MCNC: 89 MG/DL (ref 70–100)
HCT VFR BLD AUTO: 28.6 % (ref 37–47)
HGB BLD-MCNC: 9 G/DL (ref 12–16)
HOLD SPECIMEN: NORMAL
HOLD SPECIMEN: NORMAL
IMM GRANULOCYTES # BLD: 0.03 10*3/MM3 (ref 0–0.03)
IMM GRANULOCYTES NFR BLD: 0.5 % (ref 0–5)
LYMPHOCYTES # BLD AUTO: 2.24 10*3/MM3 (ref 0.72–4.86)
LYMPHOCYTES NFR BLD AUTO: 36.3 % (ref 15–45)
MCH RBC QN AUTO: 30.4 PG (ref 28–32)
MCHC RBC AUTO-ENTMCNC: 31.5 G/DL (ref 33–36)
MCV RBC AUTO: 96.6 FL (ref 82–98)
MONOCYTES # BLD AUTO: 0.75 10*3/MM3 (ref 0.19–1.3)
MONOCYTES NFR BLD AUTO: 12.2 % (ref 4–12)
NEUTROPHILS # BLD AUTO: 3.09 10*3/MM3 (ref 1.87–8.4)
NEUTROPHILS NFR BLD AUTO: 50 % (ref 39–78)
NRBC BLD MANUAL-RTO: 0 /100 WBC (ref 0–0)
PLATELET # BLD AUTO: 267 10*3/MM3 (ref 130–400)
PMV BLD AUTO: 10.5 FL (ref 6–12)
POTASSIUM BLD-SCNC: 3 MMOL/L (ref 3.5–5.3)
PROT SERPL-MCNC: 6.3 G/DL (ref 6.3–8.7)
RBC # BLD AUTO: 2.96 10*6/MM3 (ref 4.2–5.4)
SODIUM BLD-SCNC: 140 MMOL/L (ref 135–145)
WBC NRBC COR # BLD: 6.17 10*3/MM3 (ref 4.8–10.8)

## 2018-06-25 PROCEDURE — 80053 COMPREHEN METABOLIC PANEL: CPT | Performed by: INTERNAL MEDICINE

## 2018-06-25 PROCEDURE — 25010000002 CYANOCOBALAMIN PER 1000 MCG: Performed by: INTERNAL MEDICINE

## 2018-06-25 PROCEDURE — 36415 COLL VENOUS BLD VENIPUNCTURE: CPT

## 2018-06-25 PROCEDURE — 85025 COMPLETE CBC W/AUTO DIFF WBC: CPT | Performed by: INTERNAL MEDICINE

## 2018-06-25 PROCEDURE — 96372 THER/PROPH/DIAG INJ SC/IM: CPT

## 2018-06-25 PROCEDURE — 99214 OFFICE O/P EST MOD 30 MIN: CPT | Performed by: INTERNAL MEDICINE

## 2018-06-25 PROCEDURE — 63510000001 EPOETIN ALFA PER 1000 UNITS: Performed by: INTERNAL MEDICINE

## 2018-06-25 RX ORDER — CYANOCOBALAMIN 1000 UG/ML
1000 INJECTION, SOLUTION INTRAMUSCULAR; SUBCUTANEOUS ONCE
Status: COMPLETED | OUTPATIENT
Start: 2018-06-25 | End: 2018-06-25

## 2018-06-25 RX ORDER — CYANOCOBALAMIN 1000 UG/ML
1000 INJECTION, SOLUTION INTRAMUSCULAR; SUBCUTANEOUS ONCE
Status: CANCELLED
Start: 2018-06-25 | End: 2018-06-25

## 2018-06-25 RX ADMIN — CYANOCOBALAMIN 1000 MCG: 1000 INJECTION, SOLUTION INTRAMUSCULAR at 12:08

## 2018-06-25 RX ADMIN — ERYTHROPOIETIN 40000 UNITS: 40000 INJECTION, SOLUTION INTRAVENOUS; SUBCUTANEOUS at 12:08

## 2018-06-25 NOTE — PROGRESS NOTES
Patient ID:   Susanne Valentine is a 78 y.o. female.  Referring Physician:   García Wood MD  [address] [facility] [address]  [place] [address]  Primary Care Provider:  Saman Castorena DO    Assessment/Plan:  Assessment   Patient Active Problem List   Diagnosis   • Anemia of chronic kidney failure   • Anemia of chronic renal failure, stage 3 (moderate)   • B12 deficiency     Cancer Staging Information:  No matching staging information was found for the patient.    Diagnoses and all orders for this visit:    Anemia of chronic renal failure, stage 3 (moderate)  -     Clinic Appointment Request  -     CBC and Differential; Future  -     Lab Appointment Request; Future  -     STAT Comprehensive Metabolic Panel; Future  -     Clinic Appointment Request; Future  -     ONCBCN INFUSION APPOINTMENT REQUEST 01; Future    Anemia of chronic kidney failure, stage 3 (moderate)  -     Clinic Appointment Request  -     CBC and Differential; Future  -     Lab Appointment Request; Future  -     STAT Comprehensive Metabolic Panel; Future  -     Clinic Appointment Request; Future  -     ONCBCN INFUSION APPOINTMENT REQUEST 01; Future    Other orders  -     epoetin cleve (EPOGEN,PROCRIT) injection 60,000 Units; Inject 6 mL under the skin 1 (One) Time.  -     cyanocobalamin injection 1,000 mcg; Inject 1 mL under the skin 1 (One) Time.      The patient has a stable anemia from her chronic kidney disease, with the use of Procrit once a month as necessary.  We will hold the treatment today, and have her come back in 1 month with a repeat CBC, we will proceed with Procrit at 40,000 units subcutaneously if her hemoglobin is less than 10 g/DL.  Patient ID:   Susanne Valentine is a 78 y.o. female.  Referring Physician:   García Wood MD  [address] [facility] [address]  [place] [address]  Primary Care Provider:  Saman Castorena DO    Assessment/Plan:  Assessment   Patient Active Problem List   Diagnosis   • Anemia  of chronic kidney failure   • Anemia of chronic renal failure, stage 3 (moderate)   • B12 deficiency     Cancer Staging Information:  No matching staging information was found for the patient.    Diagnoses and all orders for this visit:    Anemia of chronic renal failure, stage 3 (moderate)  -     Clinic Appointment Request  -     CBC and Differential; Future  -     Lab Appointment Request; Future  -     STAT Comprehensive Metabolic Panel; Future  -     Clinic Appointment Request; Future  -     ONCBCN INFUSION APPOINTMENT REQUEST ; Future    Anemia of chronic kidney failure, stage 3 (moderate)  -     Clinic Appointment Request  -     CBC and Differential; Future  -     Lab Appointment Request; Future  -     STAT Comprehensive Metabolic Panel; Future  -     Clinic Appointment Request; Future  -     ONCBCN INFUSION APPOINTMENT REQUEST 01; Future    Other orders  -     epoetin cleve (EPOGEN,PROCRIT) injection 60,000 Units; Inject 6 mL under the skin 1 (One) Time.  -     cyanocobalamin injection 1,000 mcg; Inject 1 mL under the skin 1 (One) Time.      The patient has a stable anemia from her chronic kidney disease, with the use of Procrit once a month as necessary.  We will hold the treatment today, and have her come back in 1 month with a repeat CBC, we will proceed with Procrit at 40,000 units subcutaneously if her hemoglobin is less than 10 g/DL.     Interval History:  The patient is here for follow-up and for possible consideration for Procrit.  She has no new complaints at this time and has been doing well.  Apparently her   5 years ago, and she reminded me that I am seeing her in the past when I was still here.  She did not get any Procrit on her last visit    Interval Notes:  The following portions of the patient's history were reviewed and updated as appropriate: allergies, current medications, past family history, past medical history, past social history, past surgical history and problem list.  "    History of Present Illness   76-year-old white lady with chronic kidney disease has an anemia and has been on ESAs since 2008. She has been tolerating it well and her anemia has been stable with a supplement.    Review of Systems   Constitutional: Negative.    HENT: Negative.    Eyes: Negative.    Respiratory: Negative.    Cardiovascular: Negative.    Gastrointestinal: Negative.    Endocrine: Negative.    Genitourinary: Negative.    Musculoskeletal: Negative.    Allergic/Immunologic: Negative.    Neurological: Negative.    Hematological: Negative.    Psychiatric/Behavioral: Negative.         Physical Exam:  Vital Signs for this encounter:  BSA: 1.6 meters squared  /72   Pulse 88   Temp 97.9 °F (36.6 °C) (Tympanic)   Resp 16   Ht 157.5 cm (62.01\")   Wt 60.1 kg (132 lb 9.6 oz)   SpO2 92%   BMI 24.25 kg/m²     Physical Exam   Constitutional: She is oriented to person, place, and time. She appears well-developed and well-nourished. No distress.   HENT:   Head: Normocephalic and atraumatic.   Nose: Nose normal.   Mouth/Throat: Oropharynx is clear and moist. No oropharyngeal exudate.   Eyes: Conjunctivae and EOM are normal. Pupils are equal, round, and reactive to light. No scleral icterus.   Neck: Normal range of motion. Neck supple. No JVD present. No thyromegaly present.   Cardiovascular: Normal rate, regular rhythm and normal heart sounds.  Exam reveals no gallop and no friction rub.    Pulmonary/Chest: Effort normal and breath sounds normal. No respiratory distress. She has no wheezes. She has no rales. She exhibits no tenderness.   Abdominal: Soft. Bowel sounds are normal. She exhibits no distension. There is no tenderness. There is no guarding.   Musculoskeletal: Normal range of motion. She exhibits no edema or tenderness.   Lymphadenopathy:     She has no cervical adenopathy.   Neurological: She is alert and oriented to person, place, and time. No cranial nerve deficit.   Skin: Skin is warm and " dry.   Few purpuric spots, forearms   Psychiatric: She has a normal mood and affect. Her behavior is normal. Judgment and thought content normal.       Performance Status:  Asymptomatic    Results:  WBC   Date Value Ref Range Status   2018 6.17 4.80 - 10.80 10*3/mm3 Final     Hemoglobin   Date Value Ref Range Status   2018 9.0 (L) 12.0 - 16.0 g/dL Final     Hematocrit   Date Value Ref Range Status   2018 28.6 (L) 37.0 - 47.0 % Final     Platelets   Date Value Ref Range Status   2018 267 130 - 400 10*3/mm3 Final     Creatinine   Date Value Ref Range Status   2018 2.79 (H) 0.50 - 1.40 mg/dL Final     AST (SGOT)   Date Value Ref Range Status   2018 22 7 - 45 U/L Final        Interval History:  The patient is here for follow-up and for possible consideration for Procrit.  She has no new complaints at this time and has been doing well.  Apparently her   5 years ago, and she reminded me that I am seeing her in the past when I was still here.  She did not get any Procrit on her last visit    Interval Notes:  The following portions of the patient's history were reviewed and updated as appropriate: allergies, current medications, past family history, past medical history, past social history, past surgical history and problem list.     History of Present Illness   76-year-old white lady with chronic kidney disease has an anemia and has been on ESAs since . She has been tolerating it well and her anemia has been stable with a supplement.    Review of Systems   Constitutional: Negative.    HENT: Negative.    Eyes: Negative.    Respiratory: Negative.    Cardiovascular: Negative.    Gastrointestinal: Negative.    Endocrine: Negative.    Genitourinary: Negative.    Musculoskeletal: Negative.    Allergic/Immunologic: Negative.    Neurological: Negative.    Hematological: Negative.    Psychiatric/Behavioral: Negative.         Physical Exam:  Vital Signs for this encounter:  BSA:  "1.6 meters squared  /72   Pulse 88   Temp 97.9 °F (36.6 °C) (Tympanic)   Resp 16   Ht 157.5 cm (62.01\")   Wt 60.1 kg (132 lb 9.6 oz)   SpO2 92%   BMI 24.25 kg/m²     Physical Exam   Constitutional: She is oriented to person, place, and time. She appears well-developed and well-nourished. No distress.   HENT:   Head: Normocephalic and atraumatic.   Nose: Nose normal.   Mouth/Throat: Oropharynx is clear and moist. No oropharyngeal exudate.   Eyes: Conjunctivae and EOM are normal. Pupils are equal, round, and reactive to light. No scleral icterus.   Neck: Normal range of motion. Neck supple. No JVD present. No thyromegaly present.   Cardiovascular: Normal rate, regular rhythm and normal heart sounds.  Exam reveals no gallop and no friction rub.    Pulmonary/Chest: Effort normal and breath sounds normal. No respiratory distress. She has no wheezes. She has no rales. She exhibits no tenderness.   Abdominal: Soft. Bowel sounds are normal. She exhibits no distension. There is no tenderness. There is no guarding.   Musculoskeletal: Normal range of motion. She exhibits no edema or tenderness.   Lymphadenopathy:     She has no cervical adenopathy.   Neurological: She is alert and oriented to person, place, and time. No cranial nerve deficit.   Skin: Skin is warm and dry.   Few purpuric spots, forearms   Psychiatric: She has a normal mood and affect. Her behavior is normal. Judgment and thought content normal.       Performance Status:  Asymptomatic    Results:  WBC   Date Value Ref Range Status   06/25/2018 6.17 4.80 - 10.80 10*3/mm3 Final     Hemoglobin   Date Value Ref Range Status   06/25/2018 9.0 (L) 12.0 - 16.0 g/dL Final     Hematocrit   Date Value Ref Range Status   06/25/2018 28.6 (L) 37.0 - 47.0 % Final     Platelets   Date Value Ref Range Status   06/25/2018 267 130 - 400 10*3/mm3 Final     Creatinine   Date Value Ref Range Status   06/12/2018 2.79 (H) 0.50 - 1.40 mg/dL Final     AST (SGOT)   Date Value " Ref Range Status   06/12/2018 22 7 - 45 U/L Final     Anemia of chronic kidney disease.  Hemoglobin today is 10.4gm%,  continue Procrit.  Patient clinically asymptomatic.  Over the last 3 months for MCV of, therefore I am would check vitamin B12 and folic acid on today's draw and I have asked her to start taking oral folic acid 400 µg daily as well as start receiving vitamin B12 1000 µg subcutaneous every 4 weekly.  If the MCV remains high in another 3-4 months I may consider doing a bone marrow biopsy to diagnose myelodysplasia.  The last colonoscopy was done within the last 5 years and a verbal report given to me by the patient it was negative.    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 I am not sure if ACTHER has help improve her Hgb 11.1gm%today.  Any way per CMS guidelines, if the Hg 7.4 therefore Procrit today. Will draw Iron studies today. Clinically doing well, says she worked in the yard over the weekend. ACTH is stopped by Nephrology Svx. Recheck CBC Wed, if Iron is low will infuse Iron, however, if Hgb less then 8gm, will xfuse PRBC ( apparently she bled from Heammorhoids ).    Repeat Hgb today 7.6, however, pt clinically asymptomatic, therefore, will OBSERVE and recheck CBC again on Monday. Meanwhile check stools for Ob.  Toady Hgb still low at 7.4gm%, however pt is doing fine clinically asymptomatic. I believe she is dilutional from Volume overload 3rd space fluid from ACHTAR that was Rx and stopped last month.  Since she is asymptomatic, will OBSERVE and redo her CBC next week. However, I have advised her that my cuit off for transfusion is Hgb 7gm%.    1/3 stool cards +OB. Hgb today 7.2 however, pt is completely asymptomatic and able to preform ADLs. Will increase dose Procrit to 63742 units due May 29th.  Meanwhile check Hgb wkly.    Creatinine increased to 3.09, with worsening  renal failure, there is decreasing Hgb. If the Hgb is less then 8gm% next week, then the plan is to increase the Procrit to 60,000 Units every month STARTING TODAY.    She was inhouse 5 days ago with electrolyte imbalance from fluid overload, causing dilutional Hyponatremia and dilutional anemia. Apparently she received IV diuretics and was D/cd on fluid restriction. In house she also received 1 U PRBC. Therefore, today her Hgb is above 9.4. I am therefore, contemplating to bring back the Procrit dose to 40,000 units.

## 2018-07-02 ENCOUNTER — HOSPITAL ENCOUNTER (OUTPATIENT)
Dept: MAMMOGRAPHY | Facility: HOSPITAL | Age: 78
Discharge: HOME | End: 2018-07-02
Attending: SPECIALIST | Admitting: SPECIALIST

## 2018-07-02 DIAGNOSIS — Z12.31 ENCOUNTER FOR SCREENING MAMMOGRAM FOR MALIGNANT NEOPLASM OF BREAST: ICD-10-CM

## 2018-07-02 PROCEDURE — 77063 BREAST TOMOSYNTHESIS BI: CPT

## 2018-07-02 PROCEDURE — 77067 SCR MAMMO BI INCL CAD: CPT

## 2018-07-05 DIAGNOSIS — I13.10 BENIGN HYPERTENSIVE HEART AND RENAL DISEASE: Primary | ICD-10-CM

## 2018-07-10 ENCOUNTER — OFFICE VISIT (OUTPATIENT)
Dept: ONCOLOGY | Facility: CLINIC | Age: 78
End: 2018-07-10

## 2018-07-10 ENCOUNTER — APPOINTMENT (OUTPATIENT)
Dept: LAB | Facility: HOSPITAL | Age: 78
End: 2018-07-10

## 2018-07-10 ENCOUNTER — TRANSCRIBE ORDERS (OUTPATIENT)
Dept: ADMINISTRATIVE | Facility: HOSPITAL | Age: 78
End: 2018-07-10

## 2018-07-10 ENCOUNTER — TELEPHONE (OUTPATIENT)
Dept: ONCOLOGY | Facility: CLINIC | Age: 78
End: 2018-07-10

## 2018-07-10 VITALS
HEIGHT: 62 IN | RESPIRATION RATE: 18 BRPM | HEART RATE: 85 BPM | DIASTOLIC BLOOD PRESSURE: 88 MMHG | SYSTOLIC BLOOD PRESSURE: 186 MMHG | OXYGEN SATURATION: 95 % | TEMPERATURE: 97.1 F | WEIGHT: 130.2 LBS | BODY MASS INDEX: 23.96 KG/M2

## 2018-07-10 DIAGNOSIS — D63.1 ANEMIA OF CHRONIC RENAL FAILURE, STAGE 3 (MODERATE) (HCC): Primary | ICD-10-CM

## 2018-07-10 DIAGNOSIS — N18.30 ANEMIA OF CHRONIC RENAL FAILURE, STAGE 3 (MODERATE) (HCC): Primary | ICD-10-CM

## 2018-07-10 DIAGNOSIS — Z12.31 ENCOUNTER FOR SCREENING MAMMOGRAM FOR MALIGNANT NEOPLASM OF BREAST: Primary | ICD-10-CM

## 2018-07-10 LAB
ALBUMIN SERPL-MCNC: 3.6 G/DL (ref 3.5–5)
ALBUMIN/GLOB SERPL: 1.4 G/DL (ref 1.1–2.5)
ALP SERPL-CCNC: 83 U/L (ref 24–120)
ALT SERPL W P-5'-P-CCNC: 19 U/L (ref 0–54)
ANION GAP SERPL CALCULATED.3IONS-SCNC: 13 MMOL/L (ref 4–13)
AST SERPL-CCNC: 23 U/L (ref 7–45)
BASOPHILS # BLD AUTO: 0.03 10*3/MM3 (ref 0–0.2)
BASOPHILS NFR BLD AUTO: 0.4 % (ref 0–2)
BILIRUB SERPL-MCNC: 0.1 MG/DL (ref 0.1–1)
BUN BLD-MCNC: 36 MG/DL (ref 5–21)
BUN/CREAT SERPL: 13.8 (ref 7–25)
CALCIUM SPEC-SCNC: 8.7 MG/DL (ref 8.4–10.4)
CHLORIDE SERPL-SCNC: 103 MMOL/L (ref 98–110)
CO2 SERPL-SCNC: 24 MMOL/L (ref 24–31)
CREAT BLD-MCNC: 2.61 MG/DL (ref 0.5–1.4)
DEPRECATED RDW RBC AUTO: 57.1 FL (ref 40–54)
EOSINOPHIL # BLD AUTO: 0.04 10*3/MM3 (ref 0–0.7)
EOSINOPHIL NFR BLD AUTO: 0.5 % (ref 0–4)
ERYTHROCYTE [DISTWIDTH] IN BLOOD BY AUTOMATED COUNT: 16.1 % (ref 12–15)
GFR SERPL CREATININE-BSD FRML MDRD: 18 ML/MIN/1.73
GLOBULIN UR ELPH-MCNC: 2.6 GM/DL
GLUCOSE BLD-MCNC: 95 MG/DL (ref 70–100)
HCT VFR BLD AUTO: 31 % (ref 37–47)
HGB BLD-MCNC: 9.6 G/DL (ref 12–16)
HOLD SPECIMEN: NORMAL
HOLD SPECIMEN: NORMAL
IMM GRANULOCYTES # BLD: 0.04 10*3/MM3 (ref 0–0.03)
IMM GRANULOCYTES NFR BLD: 0.5 % (ref 0–5)
LYMPHOCYTES # BLD AUTO: 2.09 10*3/MM3 (ref 0.72–4.86)
LYMPHOCYTES NFR BLD AUTO: 28.4 % (ref 15–45)
MCH RBC QN AUTO: 29.9 PG (ref 28–32)
MCHC RBC AUTO-ENTMCNC: 31 G/DL (ref 33–36)
MCV RBC AUTO: 96.6 FL (ref 82–98)
MONOCYTES # BLD AUTO: 0.86 10*3/MM3 (ref 0.19–1.3)
MONOCYTES NFR BLD AUTO: 11.7 % (ref 4–12)
NEUTROPHILS # BLD AUTO: 4.31 10*3/MM3 (ref 1.87–8.4)
NEUTROPHILS NFR BLD AUTO: 58.5 % (ref 39–78)
NRBC BLD MANUAL-RTO: 0 /100 WBC (ref 0–0)
PLATELET # BLD AUTO: 278 10*3/MM3 (ref 130–400)
PMV BLD AUTO: 10.1 FL (ref 6–12)
POTASSIUM BLD-SCNC: 2.9 MMOL/L (ref 3.5–5.3)
PROT SERPL-MCNC: 6.2 G/DL (ref 6.3–8.7)
RBC # BLD AUTO: 3.21 10*6/MM3 (ref 4.2–5.4)
SODIUM BLD-SCNC: 140 MMOL/L (ref 135–145)
WBC NRBC COR # BLD: 7.37 10*3/MM3 (ref 4.8–10.8)

## 2018-07-10 PROCEDURE — 85025 COMPLETE CBC W/AUTO DIFF WBC: CPT | Performed by: INTERNAL MEDICINE

## 2018-07-10 PROCEDURE — 80053 COMPREHEN METABOLIC PANEL: CPT | Performed by: INTERNAL MEDICINE

## 2018-07-10 PROCEDURE — 99214 OFFICE O/P EST MOD 30 MIN: CPT | Performed by: INTERNAL MEDICINE

## 2018-07-10 PROCEDURE — 36415 COLL VENOUS BLD VENIPUNCTURE: CPT

## 2018-07-10 NOTE — PROGRESS NOTES
Patient ID:   Susanne Valentine is a 78 y.o. female.  Referring Physician:   García Wood MD  [address] [place] [address] [address]  [place] [address]  Primary Care Provider:  No Known Provider    Assessment/Plan:  Assessment   Patient Active Problem List   Diagnosis   • Anemia of chronic kidney failure   • Anemia of chronic renal failure, stage 3 (moderate)   • B12 deficiency     Cancer Staging Information:  No matching staging information was found for the patient.    There are no diagnoses linked to this encounter.  The patient has a stable anemia from her chronic kidney disease, with the use of Procrit once a month as necessary.  We will hold the treatment today, and have her come back in 1 month with a repeat CBC, we will proceed with Procrit at 40,000 units subcutaneously if her hemoglobin is less than 10 g/DL.  Patient ID:   Susanne Valentine is a 78 y.o. female.  Referring Physician:   García Wood MD  [address] [place] [address] [address]  [place] [address]  Primary Care Provider:  No Known Provider    Assessment/Plan:  Assessment   Patient Active Problem List   Diagnosis   • Anemia of chronic kidney failure   • Anemia of chronic renal failure, stage 3 (moderate)   • B12 deficiency     Cancer Staging Information:  No matching staging information was found for the patient.    There are no diagnoses linked to this encounter.  The patient has a stable anemia from her chronic kidney disease, with the use of Procrit once a month as necessary.  We will hold the treatment today, and have her come back in 1 month with a repeat CBC, we will proceed with Procrit at 40,000 units subcutaneously if her hemoglobin is less than 10 g/DL.     Interval History:  The patient is here for follow-up and for possible consideration for Procrit.  She has no new complaints at this time and has been doing well.  Apparently her   5 years ago, and she reminded me that I am seeing her in the past when I was still  "here.  She did not get any Procrit on her last visit    Interval Notes:  The following portions of the patient's history were reviewed and updated as appropriate: allergies, current medications, past family history, past medical history, past social history, past surgical history and problem list.     History of Present Illness   76-year-old white lady with chronic kidney disease has an anemia and has been on ESAs since 2008. She has been tolerating it well and her anemia has been stable with a supplement.    Review of Systems   Constitutional: Negative.    HENT: Negative.    Eyes: Negative.    Respiratory: Negative.    Cardiovascular: Negative.    Gastrointestinal: Negative.    Endocrine: Negative.    Genitourinary: Negative.    Musculoskeletal: Negative.    Allergic/Immunologic: Negative.    Neurological: Negative.    Hematological: Negative.    Psychiatric/Behavioral: Negative.         Physical Exam:  Vital Signs for this encounter:  BSA: 1.59 meters squared  BP (!) 186/88   Pulse 85   Temp 97.1 °F (36.2 °C) (Tympanic)   Resp 18   Ht 157.5 cm (62\")   Wt 59.1 kg (130 lb 3.2 oz)   SpO2 95%   BMI 23.81 kg/m²     Physical Exam   Constitutional: She is oriented to person, place, and time. She appears well-developed and well-nourished. No distress.   HENT:   Head: Normocephalic and atraumatic.   Nose: Nose normal.   Mouth/Throat: Oropharynx is clear and moist. No oropharyngeal exudate.   Eyes: Conjunctivae and EOM are normal. Pupils are equal, round, and reactive to light. No scleral icterus.   Neck: Normal range of motion. Neck supple. No JVD present. No thyromegaly present.   Cardiovascular: Normal rate, regular rhythm and normal heart sounds.  Exam reveals no gallop and no friction rub.    Pulmonary/Chest: Effort normal and breath sounds normal. No respiratory distress. She has no wheezes. She has no rales. She exhibits no tenderness.   Abdominal: Soft. Bowel sounds are normal. She exhibits no distension. " There is no tenderness. There is no guarding.   Musculoskeletal: Normal range of motion. She exhibits no edema or tenderness.   Lymphadenopathy:     She has no cervical adenopathy.   Neurological: She is alert and oriented to person, place, and time. No cranial nerve deficit.   Skin: Skin is warm and dry.   Few purpuric spots, forearms   Psychiatric: She has a normal mood and affect. Her behavior is normal. Judgment and thought content normal.       Performance Status:  Asymptomatic    Results:  WBC   Date Value Ref Range Status   07/10/2018 7.37 4.80 - 10.80 10*3/mm3 Final     Hemoglobin   Date Value Ref Range Status   07/10/2018 9.6 (L) 12.0 - 16.0 g/dL Final     Hematocrit   Date Value Ref Range Status   07/10/2018 31.0 (L) 37.0 - 47.0 % Final     Platelets   Date Value Ref Range Status   07/10/2018 278 130 - 400 10*3/mm3 Final     Creatinine   Date Value Ref Range Status   07/10/2018 2.61 (H) 0.50 - 1.40 mg/dL Final     AST (SGOT)   Date Value Ref Range Status   07/10/2018 23 7 - 45 U/L Final        Interval History:  The patient is here for follow-up and for possible consideration for Procrit.  She has no new complaints at this time and has been doing well.  Apparently her   5 years ago, and she reminded me that I am seeing her in the past when I was still here.  She did not get any Procrit on her last visit    Interval Notes:  The following portions of the patient's history were reviewed and updated as appropriate: allergies, current medications, past family history, past medical history, past social history, past surgical history and problem list.     History of Present Illness   76-year-old white lady with chronic kidney disease has an anemia and has been on ESAs since 2008. She has been tolerating it well and her anemia has been stable with a supplement.    Review of Systems   Constitutional: Negative.    HENT: Negative.    Eyes: Negative.    Respiratory: Negative.    Cardiovascular: Negative.   "  Gastrointestinal: Negative.    Endocrine: Negative.    Genitourinary: Negative.    Musculoskeletal: Negative.    Allergic/Immunologic: Negative.    Neurological: Negative.    Hematological: Negative.    Psychiatric/Behavioral: Negative.         Physical Exam:  Vital Signs for this encounter:  BSA: 1.59 meters squared  BP (!) 186/88   Pulse 85   Temp 97.1 °F (36.2 °C) (Tympanic)   Resp 18   Ht 157.5 cm (62\")   Wt 59.1 kg (130 lb 3.2 oz)   SpO2 95%   BMI 23.81 kg/m²     Physical Exam   Constitutional: She is oriented to person, place, and time. She appears well-developed and well-nourished. No distress.   HENT:   Head: Normocephalic and atraumatic.   Nose: Nose normal.   Mouth/Throat: Oropharynx is clear and moist. No oropharyngeal exudate.   Eyes: Conjunctivae and EOM are normal. Pupils are equal, round, and reactive to light. No scleral icterus.   Neck: Normal range of motion. Neck supple. No JVD present. No thyromegaly present.   Cardiovascular: Normal rate, regular rhythm and normal heart sounds.  Exam reveals no gallop and no friction rub.    Pulmonary/Chest: Effort normal and breath sounds normal. No respiratory distress. She has no wheezes. She has no rales. She exhibits no tenderness.   Abdominal: Soft. Bowel sounds are normal. She exhibits no distension. There is no tenderness. There is no guarding.   Musculoskeletal: Normal range of motion. She exhibits no edema or tenderness.   Lymphadenopathy:     She has no cervical adenopathy.   Neurological: She is alert and oriented to person, place, and time. No cranial nerve deficit.   Skin: Skin is warm and dry.   Few purpuric spots, forearms   Psychiatric: She has a normal mood and affect. Her behavior is normal. Judgment and thought content normal.       Performance Status:  Asymptomatic    Results:  WBC   Date Value Ref Range Status   07/10/2018 7.37 4.80 - 10.80 10*3/mm3 Final     Hemoglobin   Date Value Ref Range Status   07/10/2018 9.6 (L) 12.0 - 16.0 " g/dL Final     Hematocrit   Date Value Ref Range Status   07/10/2018 31.0 (L) 37.0 - 47.0 % Final     Platelets   Date Value Ref Range Status   07/10/2018 278 130 - 400 10*3/mm3 Final     Creatinine   Date Value Ref Range Status   07/10/2018 2.61 (H) 0.50 - 1.40 mg/dL Final     AST (SGOT)   Date Value Ref Range Status   07/10/2018 23 7 - 45 U/L Final     Anemia of chronic kidney disease.  Hemoglobin today is 10.4gm%,  continue Procrit.  Patient clinically asymptomatic.  Over the last 3 months for MCV of, therefore I am would check vitamin B12 and folic acid on today's draw and I have asked her to start taking oral folic acid 400 µg daily as well as start receiving vitamin B12 1000 µg subcutaneous every 4 weekly.  If the MCV remains high in another 3-4 months I may consider doing a bone marrow biopsy to diagnose myelodysplasia.  The last colonoscopy was done within the last 5 years and a verbal report given to me by the patient it was negative.    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 I am not sure if ACTHER has help improve her Hgb 11.1gm%today.  Any way per CMS guidelines, if the Hg 7.4 therefore Procrit today. Will draw Iron studies today. Clinically doing well, says she worked in the yard over the weekend. ACTH is stopped by Nephrology Svx. Recheck CBC Wed, if Iron is low will infuse Iron, however, if Hgb less then 8gm, will xfuse PRBC ( apparently she bled from Heammorhoids ).    Repeat Hgb today 7.6, however, pt clinically asymptomatic, therefore, will OBSERVE and recheck CBC again on Monday. Meanwhile check stools for Ob.  Toady Hgb still low at 7.4gm%, however pt is doing fine clinically asymptomatic. I believe she is dilutional from Volume overload 3rd space fluid from ACHTAR that was Rx and stopped last month.  Since she is asymptomatic, will OBSERVE and redo her CBC next week. However,  I have advised her that my cuit off for transfusion is Hgb 7gm%.    1/3 stool cards +OB. Hgb today 7.2 however, pt is completely asymptomatic and able to preform ADLs. Will increase dose Procrit to 43816 units due May 29th.  Meanwhile check Hgb wkly.    Creatinine increased to 3.09, with worsening renal failure, there is decreasing Hgb. If the Hgb is less then 8gm% next week, then the plan is to increase the Procrit to 60,000 Units every month STARTING TODAY.    She was inhouse 5 days ago with electrolyte imbalance from fluid overload, causing dilutional Hyponatremia and dilutional anemia. Apparently she received IV diuretics and was D/cd on fluid restriction. In house she also received 1 U PRBC. Therefore, today her Hgb is above 9.4. I am therefore, contemplating to bring back the Procrit dose to 40,000 units.     Hgb stable 9.6: Stable. Cont Procrit. q monthly

## 2018-08-01 DIAGNOSIS — D63.1 ANEMIA OF CHRONIC KIDNEY FAILURE, STAGE 3 (MODERATE) (HCC): ICD-10-CM

## 2018-08-01 DIAGNOSIS — N18.30 ANEMIA OF CHRONIC RENAL FAILURE, STAGE 3 (MODERATE) (HCC): ICD-10-CM

## 2018-08-01 DIAGNOSIS — D63.1 ANEMIA OF CHRONIC RENAL FAILURE, STAGE 3 (MODERATE) (HCC): ICD-10-CM

## 2018-08-01 DIAGNOSIS — N18.30 ANEMIA OF CHRONIC KIDNEY FAILURE, STAGE 3 (MODERATE) (HCC): ICD-10-CM

## 2018-08-07 ENCOUNTER — OFFICE VISIT (OUTPATIENT)
Dept: ONCOLOGY | Facility: CLINIC | Age: 78
End: 2018-08-07

## 2018-08-07 ENCOUNTER — LAB (OUTPATIENT)
Dept: LAB | Facility: HOSPITAL | Age: 78
End: 2018-08-07
Attending: INTERNAL MEDICINE

## 2018-08-07 ENCOUNTER — INFUSION (OUTPATIENT)
Dept: ONCOLOGY | Facility: HOSPITAL | Age: 78
End: 2018-08-07
Attending: INTERNAL MEDICINE

## 2018-08-07 VITALS
RESPIRATION RATE: 18 BRPM | TEMPERATURE: 97.3 F | DIASTOLIC BLOOD PRESSURE: 76 MMHG | HEART RATE: 91 BPM | BODY MASS INDEX: 24 KG/M2 | SYSTOLIC BLOOD PRESSURE: 140 MMHG | HEIGHT: 62 IN | WEIGHT: 130.4 LBS | OXYGEN SATURATION: 92 %

## 2018-08-07 VITALS
TEMPERATURE: 96.3 F | HEART RATE: 73 BPM | SYSTOLIC BLOOD PRESSURE: 166 MMHG | HEIGHT: 62 IN | WEIGHT: 130 LBS | DIASTOLIC BLOOD PRESSURE: 80 MMHG | OXYGEN SATURATION: 100 % | RESPIRATION RATE: 20 BRPM | BODY MASS INDEX: 23.92 KG/M2

## 2018-08-07 DIAGNOSIS — N18.30 ANEMIA OF CHRONIC KIDNEY FAILURE, STAGE 3 (MODERATE) (HCC): ICD-10-CM

## 2018-08-07 DIAGNOSIS — D63.1 ANEMIA OF CHRONIC RENAL FAILURE, STAGE 3 (MODERATE) (HCC): Primary | ICD-10-CM

## 2018-08-07 DIAGNOSIS — D63.1 ANEMIA OF CHRONIC KIDNEY FAILURE, STAGE 3 (MODERATE) (HCC): ICD-10-CM

## 2018-08-07 DIAGNOSIS — D63.1 ANEMIA OF CHRONIC RENAL FAILURE, STAGE 3 (MODERATE) (HCC): ICD-10-CM

## 2018-08-07 DIAGNOSIS — N18.30 ANEMIA OF CHRONIC RENAL FAILURE, STAGE 3 (MODERATE) (HCC): Primary | ICD-10-CM

## 2018-08-07 DIAGNOSIS — N18.30 ANEMIA OF CHRONIC RENAL FAILURE, STAGE 3 (MODERATE) (HCC): ICD-10-CM

## 2018-08-07 LAB
ALBUMIN SERPL-MCNC: 3.6 G/DL (ref 3.5–5)
ALBUMIN/GLOB SERPL: 1.3 G/DL (ref 1.1–2.5)
ALP SERPL-CCNC: 81 U/L (ref 24–120)
ALT SERPL W P-5'-P-CCNC: 17 U/L (ref 0–54)
ANION GAP SERPL CALCULATED.3IONS-SCNC: 10 MMOL/L (ref 4–13)
AST SERPL-CCNC: 22 U/L (ref 7–45)
BASOPHILS # BLD AUTO: 0.02 10*3/MM3 (ref 0–0.2)
BASOPHILS NFR BLD AUTO: 0.4 % (ref 0–2)
BILIRUB SERPL-MCNC: 0.2 MG/DL (ref 0.1–1)
BUN BLD-MCNC: 41 MG/DL (ref 5–21)
BUN/CREAT SERPL: 14.6 (ref 7–25)
CALCIUM SPEC-SCNC: 9.2 MG/DL (ref 8.4–10.4)
CHLORIDE SERPL-SCNC: 109 MMOL/L (ref 98–110)
CO2 SERPL-SCNC: 19 MMOL/L (ref 24–31)
CREAT BLD-MCNC: 2.81 MG/DL (ref 0.5–1.4)
DEPRECATED RDW RBC AUTO: 50.1 FL (ref 40–54)
EOSINOPHIL # BLD AUTO: 0.02 10*3/MM3 (ref 0–0.7)
EOSINOPHIL NFR BLD AUTO: 0.4 % (ref 0–4)
ERYTHROCYTE [DISTWIDTH] IN BLOOD BY AUTOMATED COUNT: 14.2 % (ref 12–15)
GFR SERPL CREATININE-BSD FRML MDRD: 16 ML/MIN/1.73
GLOBULIN UR ELPH-MCNC: 2.7 GM/DL
GLUCOSE BLD-MCNC: 93 MG/DL (ref 70–100)
HCT VFR BLD AUTO: 28.4 % (ref 37–47)
HGB BLD-MCNC: 8.9 G/DL (ref 12–16)
HOLD SPECIMEN: NORMAL
HOLD SPECIMEN: NORMAL
IMM GRANULOCYTES # BLD: 0.01 10*3/MM3 (ref 0–0.03)
IMM GRANULOCYTES NFR BLD: 0.2 % (ref 0–5)
LYMPHOCYTES # BLD AUTO: 1.76 10*3/MM3 (ref 0.72–4.86)
LYMPHOCYTES NFR BLD AUTO: 31.4 % (ref 15–45)
MCH RBC QN AUTO: 30.6 PG (ref 28–32)
MCHC RBC AUTO-ENTMCNC: 31.3 G/DL (ref 33–36)
MCV RBC AUTO: 97.6 FL (ref 82–98)
MONOCYTES # BLD AUTO: 0.66 10*3/MM3 (ref 0.19–1.3)
MONOCYTES NFR BLD AUTO: 11.8 % (ref 4–12)
NEUTROPHILS # BLD AUTO: 3.14 10*3/MM3 (ref 1.87–8.4)
NEUTROPHILS NFR BLD AUTO: 55.8 % (ref 39–78)
NRBC BLD MANUAL-RTO: 0 /100 WBC (ref 0–0)
PLATELET # BLD AUTO: 229 10*3/MM3 (ref 130–400)
PMV BLD AUTO: 10.4 FL (ref 6–12)
POTASSIUM BLD-SCNC: 4.2 MMOL/L (ref 3.5–5.3)
PROT SERPL-MCNC: 6.3 G/DL (ref 6.3–8.7)
RBC # BLD AUTO: 2.91 10*6/MM3 (ref 4.2–5.4)
SODIUM BLD-SCNC: 138 MMOL/L (ref 135–145)
WBC NRBC COR # BLD: 5.61 10*3/MM3 (ref 4.8–10.8)

## 2018-08-07 PROCEDURE — 25010000002 CYANOCOBALAMIN PER 1000 MCG: Performed by: INTERNAL MEDICINE

## 2018-08-07 PROCEDURE — 36415 COLL VENOUS BLD VENIPUNCTURE: CPT

## 2018-08-07 PROCEDURE — 99214 OFFICE O/P EST MOD 30 MIN: CPT | Performed by: INTERNAL MEDICINE

## 2018-08-07 PROCEDURE — 96372 THER/PROPH/DIAG INJ SC/IM: CPT

## 2018-08-07 PROCEDURE — 63510000001 EPOETIN ALFA PER 1000 UNITS: Performed by: INTERNAL MEDICINE

## 2018-08-07 PROCEDURE — 85025 COMPLETE CBC W/AUTO DIFF WBC: CPT | Performed by: INTERNAL MEDICINE

## 2018-08-07 PROCEDURE — 80053 COMPREHEN METABOLIC PANEL: CPT | Performed by: INTERNAL MEDICINE

## 2018-08-07 RX ORDER — CYANOCOBALAMIN 1000 UG/ML
1000 INJECTION, SOLUTION INTRAMUSCULAR; SUBCUTANEOUS ONCE
Status: CANCELLED
Start: 2018-08-07 | End: 2018-08-07

## 2018-08-07 RX ORDER — CYANOCOBALAMIN 1000 UG/ML
1000 INJECTION, SOLUTION INTRAMUSCULAR; SUBCUTANEOUS ONCE
Status: COMPLETED | OUTPATIENT
Start: 2018-08-07 | End: 2018-08-07

## 2018-08-07 RX ADMIN — ERYTHROPOIETIN 40000 UNITS: 40000 INJECTION, SOLUTION INTRAVENOUS; SUBCUTANEOUS at 12:33

## 2018-08-07 RX ADMIN — CYANOCOBALAMIN 1000 MCG: 1000 INJECTION, SOLUTION INTRAMUSCULAR at 12:37

## 2018-08-07 NOTE — PROGRESS NOTES
Patient ID:   Susanne Valentine is a 78 y.o. female.  Referring Physician:   García Wood MD  [address] [facility] [address]  [place] [phone]  Primary Care Provider:  Provider, No Known    Assessment/Plan:  Assessment   Patient Active Problem List   Diagnosis   • Anemia of chronic kidney failure   • Anemia of chronic renal failure, stage 3 (moderate)   • B12 deficiency     Cancer Staging Information:  No matching staging information was found for the patient.    Susanne was seen today for follow-up.    Diagnoses and all orders for this visit:    Anemia of chronic renal failure, stage 3 (moderate)  -     Clinic Appointment Request  -     CBC and Differential; Future  -     Lab Appointment Request; Future  -     STAT Comprehensive Metabolic Panel; Future  -     Clinic Appointment Request; Future  -     ONCBCN INFUSION APPOINTMENT REQUEST 01; Future    Anemia of chronic kidney failure, stage 3 (moderate)  -     Clinic Appointment Request  -     CBC and Differential; Future  -     Lab Appointment Request; Future  -     STAT Comprehensive Metabolic Panel; Future  -     Clinic Appointment Request; Future  -     ONCBCN INFUSION APPOINTMENT REQUEST 01; Future    Other orders  -     epoetin cleve (EPOGEN,PROCRIT) injection 60,000 Units; Inject 6 mL under the skin into the appropriate area as directed 1 (One) Time.  -     cyanocobalamin injection 1,000 mcg; Inject 1 mL under the skin into the appropriate area as directed 1 (One) Time.      The patient has a stable anemia from her chronic kidney disease, with the use of Procrit once a month as necessary.  We will hold the treatment today, and have her come back in 1 month with a repeat CBC, we will proceed with Procrit at 40,000 units subcutaneously if her hemoglobin is less than 10 g/DL.  Patient ID:   Susanne Valentine is a 78 y.o. female.  Referring Physician:   García Wood MD  [address] [facility] [address]  [place] [address]  Primary Care  Provider:  Provider, No Known    Assessment/Plan:  Assessment   Patient Active Problem List   Diagnosis   • Anemia of chronic kidney failure   • Anemia of chronic renal failure, stage 3 (moderate)   • B12 deficiency     Cancer Staging Information:  No matching staging information was found for the patient.    Susanne was seen today for follow-up.    Diagnoses and all orders for this visit:    Anemia of chronic renal failure, stage 3 (moderate)  -     Clinic Appointment Request  -     CBC and Differential; Future  -     Lab Appointment Request; Future  -     STAT Comprehensive Metabolic Panel; Future  -     Clinic Appointment Request; Future  -     ONCBCN INFUSION APPOINTMENT REQUEST ; Future    Anemia of chronic kidney failure, stage 3 (moderate)  -     Clinic Appointment Request  -     CBC and Differential; Future  -     Lab Appointment Request; Future  -     STAT Comprehensive Metabolic Panel; Future  -     Clinic Appointment Request; Future  -     ONCBCN INFUSION APPOINTMENT REQUEST ; Future    Other orders  -     epoetin cleve (EPOGEN,PROCRIT) injection 60,000 Units; Inject 6 mL under the skin into the appropriate area as directed 1 (One) Time.  -     cyanocobalamin injection 1,000 mcg; Inject 1 mL under the skin into the appropriate area as directed 1 (One) Time.      The patient has a stable anemia from her chronic kidney disease, with the use of Procrit once a month as necessary.  We will hold the treatment today, and have her come back in 1 month with a repeat CBC, we will proceed with Procrit at 40,000 units subcutaneously if her hemoglobin is less than 10 g/DL.     Interval History:  The patient is here for follow-up and for possible consideration for Procrit.  She has no new complaints at this time and has been doing well.  Apparently her   5 years ago, and she reminded me that I am seeing her in the past when I was still here.  She did not get any Procrit on her last visit    Interval  "Notes:  The following portions of the patient's history were reviewed and updated as appropriate: allergies, current medications, past family history, past medical history, past social history, past surgical history and problem list.     History of Present Illness   76-year-old white lady with chronic kidney disease has an anemia and has been on ESAs since 2008. She has been tolerating it well and her anemia has been stable with a supplement.    Review of Systems   Constitutional: Negative.    HENT: Negative.    Eyes: Negative.    Respiratory: Negative.    Cardiovascular: Negative.    Gastrointestinal: Negative.    Endocrine: Negative.    Genitourinary: Negative.    Musculoskeletal: Negative.    Allergic/Immunologic: Negative.    Neurological: Negative.    Hematological: Negative.    Psychiatric/Behavioral: Negative.         Physical Exam:  Vital Signs for this encounter:  BSA: 1.59 meters squared  /76   Pulse 91   Temp 97.3 °F (36.3 °C) (Tympanic)   Resp 18   Ht 157.5 cm (62\")   Wt 59.1 kg (130 lb 6.4 oz)   SpO2 92%   BMI 23.85 kg/m²     Physical Exam   Constitutional: She is oriented to person, place, and time. She appears well-developed and well-nourished. No distress.   HENT:   Head: Normocephalic and atraumatic.   Nose: Nose normal.   Mouth/Throat: Oropharynx is clear and moist. No oropharyngeal exudate.   Eyes: Conjunctivae and EOM are normal. Pupils are equal, round, and reactive to light. No scleral icterus.   Neck: Normal range of motion. Neck supple. No JVD present. No thyromegaly present.   Cardiovascular: Normal rate, regular rhythm and normal heart sounds.  Exam reveals no gallop and no friction rub.    Pulmonary/Chest: Effort normal and breath sounds normal. No respiratory distress. She has no wheezes. She has no rales. She exhibits no tenderness.   Abdominal: Soft. Bowel sounds are normal. She exhibits no distension. There is no tenderness. There is no guarding.   Musculoskeletal: Normal " range of motion. She exhibits no edema or tenderness.   Lymphadenopathy:     She has no cervical adenopathy.   Neurological: She is alert and oriented to person, place, and time. No cranial nerve deficit.   Skin: Skin is warm and dry.   Few purpuric spots, forearms   Psychiatric: She has a normal mood and affect. Her behavior is normal. Judgment and thought content normal.       Performance Status:  Asymptomatic    Results:  WBC   Date Value Ref Range Status   2018 5.61 4.80 - 10.80 10*3/mm3 Final     Hemoglobin   Date Value Ref Range Status   2018 8.9 (L) 12.0 - 16.0 g/dL Final     Hematocrit   Date Value Ref Range Status   2018 28.4 (L) 37.0 - 47.0 % Final     Platelets   Date Value Ref Range Status   2018 229 130 - 400 10*3/mm3 Final     Creatinine   Date Value Ref Range Status   07/10/2018 2.61 (H) 0.50 - 1.40 mg/dL Final     AST (SGOT)   Date Value Ref Range Status   07/10/2018 23 7 - 45 U/L Final        Interval History:  The patient is here for follow-up and for possible consideration for Procrit.  She has no new complaints at this time and has been doing well.  Apparently her   5 years ago, and she reminded me that I am seeing her in the past when I was still here.  She did not get any Procrit on her last visit    Interval Notes:  The following portions of the patient's history were reviewed and updated as appropriate: allergies, current medications, past family history, past medical history, past social history, past surgical history and problem list.     History of Present Illness   76-year-old white lady with chronic kidney disease has an anemia and has been on ESAs since . She has been tolerating it well and her anemia has been stable with a supplement.    Review of Systems   Constitutional: Negative.    HENT: Negative.    Eyes: Negative.    Respiratory: Negative.    Cardiovascular: Negative.    Gastrointestinal: Negative.    Endocrine: Negative.    Genitourinary:  "Negative.    Musculoskeletal: Negative.    Allergic/Immunologic: Negative.    Neurological: Negative.    Hematological: Negative.    Psychiatric/Behavioral: Negative.         Physical Exam:  Vital Signs for this encounter:  BSA: 1.59 meters squared  /76   Pulse 91   Temp 97.3 °F (36.3 °C) (Tympanic)   Resp 18   Ht 157.5 cm (62\")   Wt 59.1 kg (130 lb 6.4 oz)   SpO2 92%   BMI 23.85 kg/m²     Physical Exam   Constitutional: She is oriented to person, place, and time. She appears well-developed and well-nourished. No distress.   HENT:   Head: Normocephalic and atraumatic.   Nose: Nose normal.   Mouth/Throat: Oropharynx is clear and moist. No oropharyngeal exudate.   Eyes: Pupils are equal, round, and reactive to light. Conjunctivae and EOM are normal. No scleral icterus.   Neck: Normal range of motion. Neck supple. No JVD present. No thyromegaly present.   Cardiovascular: Normal rate, regular rhythm and normal heart sounds.  Exam reveals no gallop and no friction rub.    Pulmonary/Chest: Effort normal and breath sounds normal. No respiratory distress. She has no wheezes. She has no rales. She exhibits no tenderness.   Abdominal: Soft. Bowel sounds are normal. She exhibits no distension. There is no tenderness. There is no guarding.   Musculoskeletal: Normal range of motion. She exhibits no edema or tenderness.   Lymphadenopathy:     She has no cervical adenopathy.   Neurological: She is alert and oriented to person, place, and time. No cranial nerve deficit.   Skin: Skin is warm and dry.   Few purpuric spots, forearms   Psychiatric: She has a normal mood and affect. Her behavior is normal. Judgment and thought content normal.       Performance Status:  Asymptomatic    Results:  WBC   Date Value Ref Range Status   08/07/2018 5.61 4.80 - 10.80 10*3/mm3 Final     Hemoglobin   Date Value Ref Range Status   08/07/2018 8.9 (L) 12.0 - 16.0 g/dL Final     Hematocrit   Date Value Ref Range Status   08/07/2018 28.4 " (L) 37.0 - 47.0 % Final     Platelets   Date Value Ref Range Status   08/07/2018 229 130 - 400 10*3/mm3 Final     Creatinine   Date Value Ref Range Status   07/10/2018 2.61 (H) 0.50 - 1.40 mg/dL Final     AST (SGOT)   Date Value Ref Range Status   07/10/2018 23 7 - 45 U/L Final     Anemia of chronic kidney disease.  Hemoglobin today is 10.4gm%,  continue Procrit.  Patient clinically asymptomatic.  Over the last 3 months for MCV of, therefore I am would check vitamin B12 and folic acid on today's draw and I have asked her to start taking oral folic acid 400 µg daily as well as start receiving vitamin B12 1000 µg subcutaneous every 4 weekly.  If the MCV remains high in another 3-4 months I may consider doing a bone marrow biopsy to diagnose myelodysplasia.  The last colonoscopy was done within the last 5 years and a verbal report given to me by the patient it was negative.    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 I am not sure if ACTHER has help improve her Hgb 11.1gm%today.  Any way per CMS guidelines, if the Hg 7.4 therefore Procrit today. Will draw Iron studies today. Clinically doing well, says she worked in the yard over the weekend. ACTH is stopped by Nephrology Svx. Recheck CBC Wed, if Iron is low will infuse Iron, however, if Hgb less then 8gm, will xfuse PRBC ( apparently she bled from Heammorhoids ).    Repeat Hgb today 7.6, however, pt clinically asymptomatic, therefore, will OBSERVE and recheck CBC again on Monday. Meanwhile check stools for Ob.  Toady Hgb still low at 7.4gm%, however pt is doing fine clinically asymptomatic. I believe she is dilutional from Volume overload 3rd space fluid from ACHTAR that was Rx and stopped last month.  Since she is asymptomatic, will OBSERVE and redo her CBC next week. However, I have advised her that my cuit off for transfusion is Hgb 7gm%.    1/3  stool cards +OB. Hgb today 7.2 however, pt is completely asymptomatic and able to preform ADLs. Will increase dose Procrit to 44264 units due May 29th.  Meanwhile check Hgb wkly.    Creatinine increased to 3.09, with worsening renal failure, there is decreasing Hgb. If the Hgb is less then 8gm% next week, then the plan is to increase the Procrit to 60,000 Units every month STARTING TODAY.    She was inhouse 5 days ago with electrolyte imbalance from fluid overload, causing dilutional Hyponatremia and dilutional anemia. Apparently she received IV diuretics and was D/cd on fluid restriction. In house she also received 1 U PRBC. Therefore, today her Hgb is above 9.4. I am therefore, contemplating to bring back the Procrit dose to 40,000 units.     Hgb stable 8.9: Stable. Cont Procrit. q monthly 40,000 units.

## 2018-08-30 DIAGNOSIS — I13.10 BENIGN HYPERTENSIVE HEART AND RENAL DISEASE: Primary | ICD-10-CM

## 2018-09-04 ENCOUNTER — INFUSION (OUTPATIENT)
Dept: ONCOLOGY | Facility: HOSPITAL | Age: 78
End: 2018-09-04
Attending: INTERNAL MEDICINE

## 2018-09-04 ENCOUNTER — OFFICE VISIT (OUTPATIENT)
Dept: ONCOLOGY | Facility: CLINIC | Age: 78
End: 2018-09-04

## 2018-09-04 ENCOUNTER — LAB (OUTPATIENT)
Dept: LAB | Facility: HOSPITAL | Age: 78
End: 2018-09-04
Attending: INTERNAL MEDICINE

## 2018-09-04 VITALS
WEIGHT: 120 LBS | OXYGEN SATURATION: 100 % | TEMPERATURE: 96.7 F | BODY MASS INDEX: 22.08 KG/M2 | SYSTOLIC BLOOD PRESSURE: 154 MMHG | HEART RATE: 81 BPM | RESPIRATION RATE: 20 BRPM | DIASTOLIC BLOOD PRESSURE: 76 MMHG | HEIGHT: 62 IN

## 2018-09-04 VITALS
HEART RATE: 100 BPM | BODY MASS INDEX: 22.08 KG/M2 | OXYGEN SATURATION: 99 % | DIASTOLIC BLOOD PRESSURE: 86 MMHG | WEIGHT: 120 LBS | TEMPERATURE: 97.3 F | HEIGHT: 62 IN | RESPIRATION RATE: 16 BRPM | SYSTOLIC BLOOD PRESSURE: 142 MMHG

## 2018-09-04 DIAGNOSIS — N18.30 ANEMIA OF CHRONIC KIDNEY FAILURE, STAGE 3 (MODERATE) (HCC): ICD-10-CM

## 2018-09-04 DIAGNOSIS — D63.1 ANEMIA OF CHRONIC RENAL FAILURE, STAGE 3 (MODERATE) (HCC): Primary | ICD-10-CM

## 2018-09-04 DIAGNOSIS — D63.1 ANEMIA OF CHRONIC RENAL FAILURE, STAGE 3 (MODERATE) (HCC): ICD-10-CM

## 2018-09-04 DIAGNOSIS — N18.30 ANEMIA OF CHRONIC RENAL FAILURE, STAGE 3 (MODERATE) (HCC): ICD-10-CM

## 2018-09-04 DIAGNOSIS — D63.1 ANEMIA OF CHRONIC KIDNEY FAILURE, STAGE 3 (MODERATE) (HCC): ICD-10-CM

## 2018-09-04 DIAGNOSIS — N18.30 ANEMIA OF CHRONIC RENAL FAILURE, STAGE 3 (MODERATE) (HCC): Primary | ICD-10-CM

## 2018-09-04 LAB
ALBUMIN SERPL-MCNC: 3.6 G/DL (ref 3.5–5)
ALBUMIN/GLOB SERPL: 1.3 G/DL (ref 1.1–2.5)
ALP SERPL-CCNC: 92 U/L (ref 24–120)
ALT SERPL W P-5'-P-CCNC: 18 U/L (ref 0–54)
ANION GAP SERPL CALCULATED.3IONS-SCNC: 9 MMOL/L (ref 4–13)
AST SERPL-CCNC: 21 U/L (ref 7–45)
BASOPHILS # BLD AUTO: 0.04 10*3/MM3 (ref 0–0.2)
BASOPHILS NFR BLD AUTO: 0.6 % (ref 0–2)
BILIRUB SERPL-MCNC: 0.3 MG/DL (ref 0.1–1)
BUN BLD-MCNC: 40 MG/DL (ref 5–21)
BUN/CREAT SERPL: 12.2 (ref 7–25)
CALCIUM SPEC-SCNC: 9.1 MG/DL (ref 8.4–10.4)
CHLORIDE SERPL-SCNC: 107 MMOL/L (ref 98–110)
CO2 SERPL-SCNC: 19 MMOL/L (ref 24–31)
CREAT BLD-MCNC: 3.27 MG/DL (ref 0.5–1.4)
DEPRECATED RDW RBC AUTO: 49.6 FL (ref 40–54)
EOSINOPHIL # BLD AUTO: 0.01 10*3/MM3 (ref 0–0.7)
EOSINOPHIL NFR BLD AUTO: 0.2 % (ref 0–4)
ERYTHROCYTE [DISTWIDTH] IN BLOOD BY AUTOMATED COUNT: 13.4 % (ref 12–15)
GFR SERPL CREATININE-BSD FRML MDRD: 14 ML/MIN/1.73
GLOBULIN UR ELPH-MCNC: 2.8 GM/DL
GLUCOSE BLD-MCNC: 94 MG/DL (ref 70–100)
HCT VFR BLD AUTO: 32 % (ref 37–47)
HGB BLD-MCNC: 9.7 G/DL (ref 12–16)
HOLD SPECIMEN: NORMAL
HOLD SPECIMEN: NORMAL
IMM GRANULOCYTES # BLD: 0.01 10*3/MM3 (ref 0–0.03)
IMM GRANULOCYTES NFR BLD: 0.2 % (ref 0–5)
LYMPHOCYTES # BLD AUTO: 2 10*3/MM3 (ref 0.72–4.86)
LYMPHOCYTES NFR BLD AUTO: 32.4 % (ref 15–45)
MCH RBC QN AUTO: 30.5 PG (ref 28–32)
MCHC RBC AUTO-ENTMCNC: 30.3 G/DL (ref 33–36)
MCV RBC AUTO: 100.6 FL (ref 82–98)
MONOCYTES # BLD AUTO: 0.69 10*3/MM3 (ref 0.19–1.3)
MONOCYTES NFR BLD AUTO: 11.2 % (ref 4–12)
NEUTROPHILS # BLD AUTO: 3.43 10*3/MM3 (ref 1.87–8.4)
NEUTROPHILS NFR BLD AUTO: 55.4 % (ref 39–78)
NRBC BLD MANUAL-RTO: 0 /100 WBC (ref 0–0)
PLATELET # BLD AUTO: 272 10*3/MM3 (ref 130–400)
PMV BLD AUTO: 10 FL (ref 6–12)
POTASSIUM BLD-SCNC: 5.6 MMOL/L (ref 3.5–5.3)
PROT SERPL-MCNC: 6.4 G/DL (ref 6.3–8.7)
RBC # BLD AUTO: 3.18 10*6/MM3 (ref 4.2–5.4)
SODIUM BLD-SCNC: 135 MMOL/L (ref 135–145)
WBC NRBC COR # BLD: 6.18 10*3/MM3 (ref 4.8–10.8)

## 2018-09-04 PROCEDURE — 85025 COMPLETE CBC W/AUTO DIFF WBC: CPT

## 2018-09-04 PROCEDURE — 80053 COMPREHEN METABOLIC PANEL: CPT

## 2018-09-04 PROCEDURE — 99214 OFFICE O/P EST MOD 30 MIN: CPT | Performed by: INTERNAL MEDICINE

## 2018-09-04 PROCEDURE — 96372 THER/PROPH/DIAG INJ SC/IM: CPT

## 2018-09-04 PROCEDURE — 63510000001 EPOETIN ALFA PER 1000 UNITS: Performed by: INTERNAL MEDICINE

## 2018-09-04 PROCEDURE — 25010000002 CYANOCOBALAMIN PER 1000 MCG: Performed by: INTERNAL MEDICINE

## 2018-09-04 PROCEDURE — 36415 COLL VENOUS BLD VENIPUNCTURE: CPT

## 2018-09-04 RX ORDER — CYANOCOBALAMIN 1000 UG/ML
1000 INJECTION, SOLUTION INTRAMUSCULAR; SUBCUTANEOUS ONCE
Status: COMPLETED | OUTPATIENT
Start: 2018-09-04 | End: 2018-09-04

## 2018-09-04 RX ORDER — CYANOCOBALAMIN 1000 UG/ML
1000 INJECTION, SOLUTION INTRAMUSCULAR; SUBCUTANEOUS ONCE
Status: CANCELLED
Start: 2018-09-04 | End: 2018-09-04

## 2018-09-04 RX ADMIN — ERYTHROPOIETIN 40000 UNITS: 40000 INJECTION, SOLUTION INTRAVENOUS; SUBCUTANEOUS at 11:53

## 2018-09-04 RX ADMIN — CYANOCOBALAMIN 1000 MCG: 1000 INJECTION, SOLUTION INTRAMUSCULAR at 11:52

## 2018-09-04 NOTE — PROGRESS NOTES
Patient ID:   Susanne Valentine is a 78 y.o. female.  Referring Physician:   García Wood MD  [address] [facility] [address]  [place] [address]  Primary Care Provider:  Provider, No Known    Assessment/Plan:  Assessment   Patient Active Problem List   Diagnosis   • Anemia of chronic kidney failure   • Anemia of chronic renal failure, stage 3 (moderate)   • B12 deficiency     Cancer Staging Information:  No matching staging information was found for the patient.    Diagnoses and all orders for this visit:    Anemia of chronic renal failure, stage 3 (moderate)  -     Clinic Appointment Request    Anemia of chronic kidney failure, stage 3 (moderate)  -     Clinic Appointment Request    Other orders  -     epoetin cleve (EPOGEN,PROCRIT) injection 40,000 Units; Inject 4 mL under the skin into the appropriate area as directed 1 (One) Time.  -     cyanocobalamin injection 1,000 mcg; Inject 1 mL under the skin into the appropriate area as directed 1 (One) Time.      The patient has a stable anemia from her chronic kidney disease, with the use of Procrit once a month as necessary.  We will hold the treatment today, and have her come back in 1 month with a repeat CBC, we will proceed with Procrit at 40,000 units subcutaneously if her hemoglobin is less than 10 g/DL.  Patient ID:   Susanne Valentine is a 78 y.o. female.  Referring Physician:   García Wood MD  [address] [facility] [address]  [place] [address]  Primary Care Provider:  Provider, No Known    Assessment/Plan:  Assessment   Patient Active Problem List   Diagnosis   • Anemia of chronic kidney failure   • Anemia of chronic renal failure, stage 3 (moderate)   • B12 deficiency     Cancer Staging Information:  No matching staging information was found for the patient.    Diagnoses and all orders for this visit:    Anemia of chronic renal failure, stage 3 (moderate)  -     Clinic Appointment Request    Anemia of chronic kidney failure, stage 3  (moderate)  -     Clinic Appointment Request    Other orders  -     epoetin cleve (EPOGEN,PROCRIT) injection 40,000 Units; Inject 4 mL under the skin into the appropriate area as directed 1 (One) Time.  -     cyanocobalamin injection 1,000 mcg; Inject 1 mL under the skin into the appropriate area as directed 1 (One) Time.      The patient has a stable anemia from her chronic kidney disease, with the use of Procrit once a month as necessary.  We will hold the treatment today, and have her come back in 1 month with a repeat CBC, we will proceed with Procrit at 40,000 units subcutaneously if her hemoglobin is less than 10 g/DL.     Interval History:  The patient is here for follow-up and for possible consideration for Procrit.  She has no new complaints at this time and has been doing well.  Apparently her   5 years ago, and she reminded me that I am seeing her in the past when I was still here.  She did not get any Procrit on her last visit    Interval Notes:  The following portions of the patient's history were reviewed and updated as appropriate: allergies, current medications, past family history, past medical history, past social history, past surgical history and problem list.     History of Present Illness   76-year-old white lady with chronic kidney disease has an anemia and has been on ESAs since . She has been tolerating it well and her anemia has been stable with a supplement.    Review of Systems   Constitutional: Negative.    HENT: Negative.    Eyes: Negative.    Respiratory: Negative.    Cardiovascular: Negative.    Gastrointestinal: Negative.    Endocrine: Negative.    Genitourinary: Negative.    Musculoskeletal: Negative.    Allergic/Immunologic: Negative.    Neurological: Negative.    Hematological: Negative.    Psychiatric/Behavioral: Negative.         Physical Exam:  Vital Signs for this encounter:  BSA: 1.54 meters squared  /86   Pulse 100   Temp 97.3 °F (36.3 °C) (Tympanic)    "Resp 16   Ht 157.5 cm (62.01\")   Wt 54.4 kg (120 lb)   SpO2 99%   BMI 21.94 kg/m²     Physical Exam   Constitutional: She is oriented to person, place, and time. She appears well-developed and well-nourished. No distress.   HENT:   Head: Normocephalic and atraumatic.   Nose: Nose normal.   Mouth/Throat: Oropharynx is clear and moist. No oropharyngeal exudate.   Eyes: Conjunctivae and EOM are normal. Pupils are equal, round, and reactive to light. No scleral icterus.   Neck: Normal range of motion. Neck supple. No JVD present. No thyromegaly present.   Cardiovascular: Normal rate, regular rhythm and normal heart sounds.  Exam reveals no gallop and no friction rub.    Pulmonary/Chest: Effort normal and breath sounds normal. No respiratory distress. She has no wheezes. She has no rales. She exhibits no tenderness.   Abdominal: Soft. Bowel sounds are normal. She exhibits no distension. There is no tenderness. There is no guarding.   Musculoskeletal: Normal range of motion. She exhibits no edema or tenderness.   Lymphadenopathy:     She has no cervical adenopathy.   Neurological: She is alert and oriented to person, place, and time. No cranial nerve deficit.   Skin: Skin is warm and dry.   Few purpuric spots, forearms   Psychiatric: She has a normal mood and affect. Her behavior is normal. Judgment and thought content normal.       Performance Status:  Asymptomatic    Results:  WBC   Date Value Ref Range Status   09/04/2018 6.18 4.80 - 10.80 10*3/mm3 Final     Hemoglobin   Date Value Ref Range Status   09/04/2018 9.7 (L) 12.0 - 16.0 g/dL Final     Hematocrit   Date Value Ref Range Status   09/04/2018 32.0 (L) 37.0 - 47.0 % Final     Platelets   Date Value Ref Range Status   09/04/2018 272 130 - 400 10*3/mm3 Final     Creatinine   Date Value Ref Range Status   08/07/2018 2.81 (H) 0.50 - 1.40 mg/dL Final     AST (SGOT)   Date Value Ref Range Status   08/07/2018 22 7 - 45 U/L Final        Interval History:  The patient " "is here for follow-up and for possible consideration for Procrit.  She has no new complaints at this time and has been doing well.  Apparently her   5 years ago, and she reminded me that I am seeing her in the past when I was still here.  She did not get any Procrit on her last visit    Interval Notes:  The following portions of the patient's history were reviewed and updated as appropriate: allergies, current medications, past family history, past medical history, past social history, past surgical history and problem list.     History of Present Illness   76-year-old white lady with chronic kidney disease has an anemia and has been on ESAs since . She has been tolerating it well and her anemia has been stable with a supplement.    Review of Systems   Constitutional: Negative.    HENT: Negative.    Eyes: Negative.    Respiratory: Negative.    Cardiovascular: Negative.    Gastrointestinal: Negative.    Endocrine: Negative.    Genitourinary: Negative.    Musculoskeletal: Negative.    Allergic/Immunologic: Negative.    Neurological: Negative.    Hematological: Negative.    Psychiatric/Behavioral: Negative.         Physical Exam:  Vital Signs for this encounter:  BSA: 1.54 meters squared  /86   Pulse 100   Temp 97.3 °F (36.3 °C) (Tympanic)   Resp 16   Ht 157.5 cm (62.01\")   Wt 54.4 kg (120 lb)   SpO2 99%   BMI 21.94 kg/m²     Physical Exam   Constitutional: She is oriented to person, place, and time. She appears well-developed and well-nourished. No distress.   HENT:   Head: Normocephalic and atraumatic.   Nose: Nose normal.   Mouth/Throat: Oropharynx is clear and moist. No oropharyngeal exudate.   Eyes: Pupils are equal, round, and reactive to light. Conjunctivae and EOM are normal. No scleral icterus.   Neck: Normal range of motion. Neck supple. No JVD present. No thyromegaly present.   Cardiovascular: Normal rate, regular rhythm and normal heart sounds.  Exam reveals no gallop and no " friction rub.    Pulmonary/Chest: Effort normal and breath sounds normal. No respiratory distress. She has no wheezes. She has no rales. She exhibits no tenderness.   Abdominal: Soft. Bowel sounds are normal. She exhibits no distension. There is no tenderness. There is no guarding.   Musculoskeletal: Normal range of motion. She exhibits no edema or tenderness.   Lymphadenopathy:     She has no cervical adenopathy.   Neurological: She is alert and oriented to person, place, and time. No cranial nerve deficit.   Skin: Skin is warm and dry.   Few purpuric spots, forearms   Psychiatric: She has a normal mood and affect. Her behavior is normal. Judgment and thought content normal.       Performance Status:  Asymptomatic    Results:  WBC   Date Value Ref Range Status   09/04/2018 6.18 4.80 - 10.80 10*3/mm3 Final     Hemoglobin   Date Value Ref Range Status   09/04/2018 9.7 (L) 12.0 - 16.0 g/dL Final     Hematocrit   Date Value Ref Range Status   09/04/2018 32.0 (L) 37.0 - 47.0 % Final     Platelets   Date Value Ref Range Status   09/04/2018 272 130 - 400 10*3/mm3 Final     Creatinine   Date Value Ref Range Status   08/07/2018 2.81 (H) 0.50 - 1.40 mg/dL Final     AST (SGOT)   Date Value Ref Range Status   08/07/2018 22 7 - 45 U/L Final     Anemia of chronic kidney disease.  Hemoglobin today is 10.4gm%,  continue Procrit.  Patient clinically asymptomatic.  Over the last 3 months for MCV of, therefore I am would check vitamin B12 and folic acid on today's draw and I have asked her to start taking oral folic acid 400 µg daily as well as start receiving vitamin B12 1000 µg subcutaneous every 4 weekly.  If the MCV remains high in another 3-4 months I may consider doing a bone marrow biopsy to diagnose myelodysplasia.  The last colonoscopy was done within the last 5 years and a verbal report given to me by the patient it was negative.    In early Feb 2018 she started receiving ACTHAR Ing s/q by her Nephrologist Mon and Thurs for  Membranoproliferative Glomeulonephritis, ( It is basically ACTH ). I am not sure if ACTHER has help improve her Hgb 11.1gm%today.  Any way per CMS guidelines, if the Hg 7.4 therefore Procrit today. Will draw Iron studies today. Clinically doing well, says she worked in the yard over the weekend. ACTH is stopped by Nephrology Svx. Recheck CBC Wed, if Iron is low will infuse Iron, however, if Hgb less then 8gm, will xfuse PRBC ( apparently she bled from Heammorhoids ).    Repeat Hgb today 7.6, however, pt clinically asymptomatic, therefore, will OBSERVE and recheck CBC again on Monday. Meanwhile check stools for Ob.  Toady Hgb still low at 7.4gm%, however pt is doing fine clinically asymptomatic. I believe she is dilutional from Volume overload 3rd space fluid from ACHTAR that was Rx and stopped last month.  Since she is asymptomatic, will OBSERVE and redo her CBC next week. However, I have advised her that my cuit off for transfusion is Hgb 7gm%.    1/3 stool cards +OB. Hgb today 7.2 however, pt is completely asymptomatic and able to preform ADLs. Will increase dose Procrit to 50091 units due May 29th.  Meanwhile check Hgb wkly.    Creatinine increased to 3.09, with worsening renal failure, there is decreasing Hgb. If the Hgb is less then 8gm% next week, then the plan is to increase the Procrit to 60,000 Units every month STARTING TODAY.    She was inhouse 5 days ago with electrolyte imbalance from fluid overload, causing dilutional Hyponatremia and dilutional anemia. Apparently she received IV diuretics and was D/cd on fluid restriction. In house she also received 1 U PRBC. Therefore, today her Hgb is above 9.4. I am therefore, contemplating to bring back the Procrit dose to 40,000 units.     Hgb stable 9.7: Stable. Cont Procrit. q monthly 40,000 units. He is on Tacrolimus for reasons not clear to me. Will check with Nephrology.

## 2018-09-13 ENCOUNTER — HOSPITAL ENCOUNTER (INPATIENT)
Age: 78
LOS: 2 days | Discharge: HOME | DRG: 683 | End: 2018-09-16
Attending: EMERGENCY MEDICINE | Admitting: INTERNAL MEDICINE
Payer: MEDICARE

## 2018-09-13 DIAGNOSIS — E87.5 HYPERKALEMIA: Primary | ICD-10-CM

## 2018-09-13 DIAGNOSIS — N17.9 AKI (ACUTE KIDNEY INJURY) (HCC): ICD-10-CM

## 2018-09-13 LAB
ALBUMIN SERPL-MCNC: 3.7 G/DL (ref 3.5–5.2)
ALP BLD-CCNC: 110 U/L (ref 35–104)
ALT SERPL-CCNC: 5 U/L (ref 5–33)
ANION GAP SERPL CALCULATED.3IONS-SCNC: 14 MMOL/L (ref 7–19)
AST SERPL-CCNC: 12 U/L (ref 5–32)
BASOPHILS ABSOLUTE: 0 K/UL (ref 0–0.2)
BASOPHILS RELATIVE PERCENT: 0.3 % (ref 0–1)
BILIRUB SERPL-MCNC: <0.2 MG/DL (ref 0.2–1.2)
BUN BLDV-MCNC: 43 MG/DL (ref 8–23)
CALCIUM SERPL-MCNC: 9 MG/DL (ref 8.8–10.2)
CHLORIDE BLD-SCNC: 105 MMOL/L (ref 98–111)
CO2: 19 MMOL/L (ref 22–29)
CREAT SERPL-MCNC: 3.1 MG/DL (ref 0.5–0.9)
EOSINOPHILS ABSOLUTE: 0 K/UL (ref 0–0.6)
EOSINOPHILS RELATIVE PERCENT: 0.3 % (ref 0–5)
GFR NON-AFRICAN AMERICAN: 14
GLUCOSE BLD-MCNC: 93 MG/DL (ref 74–109)
HCT VFR BLD CALC: 36 % (ref 37–47)
HEMOGLOBIN: 10.6 G/DL (ref 12–16)
LYMPHOCYTES ABSOLUTE: 1.8 K/UL (ref 1.1–4.5)
LYMPHOCYTES RELATIVE PERCENT: 30.6 % (ref 20–40)
MAGNESIUM: 1.9 MG/DL (ref 1.6–2.4)
MCH RBC QN AUTO: 29.9 PG (ref 27–31)
MCHC RBC AUTO-ENTMCNC: 29.4 G/DL (ref 33–37)
MCV RBC AUTO: 101.7 FL (ref 81–99)
MONOCYTES ABSOLUTE: 0.6 K/UL (ref 0–0.9)
MONOCYTES RELATIVE PERCENT: 10.6 % (ref 0–10)
NEUTROPHILS ABSOLUTE: 3.4 K/UL (ref 1.5–7.5)
NEUTROPHILS RELATIVE PERCENT: 57.9 % (ref 50–65)
PDW BLD-RTO: 14.5 % (ref 11.5–14.5)
PHOSPHORUS: 5.7 MG/DL (ref 2.5–4.5)
PLATELET # BLD: 257 K/UL (ref 130–400)
PMV BLD AUTO: 9.4 FL (ref 9.4–12.3)
POTASSIUM SERPL-SCNC: 5.3 MMOL/L (ref 3.5–5)
POTASSIUM SERPL-SCNC: 6.4 MMOL/L (ref 3.5–5)
RBC # BLD: 3.54 M/UL (ref 4.2–5.4)
SODIUM BLD-SCNC: 138 MMOL/L (ref 136–145)
TOTAL PROTEIN: 6.3 G/DL (ref 6.6–8.7)
WBC # BLD: 5.9 K/UL (ref 4.8–10.8)

## 2018-09-13 PROCEDURE — 96375 TX/PRO/DX INJ NEW DRUG ADDON: CPT

## 2018-09-13 PROCEDURE — 6370000000 HC RX 637 (ALT 250 FOR IP): Performed by: EMERGENCY MEDICINE

## 2018-09-13 PROCEDURE — G0378 HOSPITAL OBSERVATION PER HR: HCPCS

## 2018-09-13 PROCEDURE — 99223 1ST HOSP IP/OBS HIGH 75: CPT | Performed by: INTERNAL MEDICINE

## 2018-09-13 PROCEDURE — 94640 AIRWAY INHALATION TREATMENT: CPT

## 2018-09-13 PROCEDURE — 2580000003 HC RX 258: Performed by: EMERGENCY MEDICINE

## 2018-09-13 PROCEDURE — 93005 ELECTROCARDIOGRAM TRACING: CPT

## 2018-09-13 PROCEDURE — 6370000000 HC RX 637 (ALT 250 FOR IP): Performed by: INTERNAL MEDICINE

## 2018-09-13 PROCEDURE — 2580000003 HC RX 258: Performed by: INTERNAL MEDICINE

## 2018-09-13 PROCEDURE — 84100 ASSAY OF PHOSPHORUS: CPT

## 2018-09-13 PROCEDURE — 2500000003 HC RX 250 WO HCPCS: Performed by: EMERGENCY MEDICINE

## 2018-09-13 PROCEDURE — 80053 COMPREHEN METABOLIC PANEL: CPT

## 2018-09-13 PROCEDURE — 99285 EMERGENCY DEPT VISIT HI MDM: CPT | Performed by: EMERGENCY MEDICINE

## 2018-09-13 PROCEDURE — 85025 COMPLETE CBC W/AUTO DIFF WBC: CPT

## 2018-09-13 PROCEDURE — 84132 ASSAY OF SERUM POTASSIUM: CPT

## 2018-09-13 PROCEDURE — 96374 THER/PROPH/DIAG INJ IV PUSH: CPT

## 2018-09-13 PROCEDURE — 99285 EMERGENCY DEPT VISIT HI MDM: CPT

## 2018-09-13 PROCEDURE — 36415 COLL VENOUS BLD VENIPUNCTURE: CPT

## 2018-09-13 PROCEDURE — 83735 ASSAY OF MAGNESIUM: CPT

## 2018-09-13 RX ORDER — SODIUM CHLORIDE 0.9 % (FLUSH) 0.9 %
10 SYRINGE (ML) INJECTION EVERY 12 HOURS SCHEDULED
Status: DISCONTINUED | OUTPATIENT
Start: 2018-09-13 | End: 2018-09-16 | Stop reason: HOSPADM

## 2018-09-13 RX ORDER — DEXTROSE MONOHYDRATE 25 G/50ML
25 INJECTION, SOLUTION INTRAVENOUS ONCE
Status: COMPLETED | OUTPATIENT
Start: 2018-09-13 | End: 2018-09-13

## 2018-09-13 RX ORDER — BUMETANIDE 2 MG/1
4 TABLET ORAL 2 TIMES DAILY
COMMUNITY
End: 2020-04-27 | Stop reason: ALTCHOICE

## 2018-09-13 RX ORDER — AMLODIPINE BESYLATE 5 MG/1
5 TABLET ORAL DAILY
Status: DISCONTINUED | OUTPATIENT
Start: 2018-09-13 | End: 2018-09-14

## 2018-09-13 RX ORDER — TACROLIMUS 1 MG/1
1 CAPSULE ORAL 2 TIMES DAILY
COMMUNITY
End: 2019-02-21 | Stop reason: ALTCHOICE

## 2018-09-13 RX ORDER — IPRATROPIUM BROMIDE AND ALBUTEROL SULFATE 2.5; .5 MG/3ML; MG/3ML
1 SOLUTION RESPIRATORY (INHALATION) ONCE
Status: COMPLETED | OUTPATIENT
Start: 2018-09-13 | End: 2018-09-13

## 2018-09-13 RX ORDER — SODIUM POLYSTYRENE SULFONATE 15 G/60ML
15 SUSPENSION ORAL; RECTAL ONCE
Status: COMPLETED | OUTPATIENT
Start: 2018-09-13 | End: 2018-09-13

## 2018-09-13 RX ORDER — LABETALOL HYDROCHLORIDE 5 MG/ML
10 INJECTION, SOLUTION INTRAVENOUS ONCE
Status: COMPLETED | OUTPATIENT
Start: 2018-09-13 | End: 2018-09-13

## 2018-09-13 RX ORDER — 0.9 % SODIUM CHLORIDE 0.9 %
1000 INTRAVENOUS SOLUTION INTRAVENOUS ONCE
Status: COMPLETED | OUTPATIENT
Start: 2018-09-13 | End: 2018-09-13

## 2018-09-13 RX ORDER — ONDANSETRON 2 MG/ML
4 INJECTION INTRAMUSCULAR; INTRAVENOUS EVERY 6 HOURS PRN
Status: DISCONTINUED | OUTPATIENT
Start: 2018-09-13 | End: 2018-09-16 | Stop reason: HOSPADM

## 2018-09-13 RX ORDER — CLONIDINE HYDROCHLORIDE 0.1 MG/1
0.1 TABLET ORAL ONCE
Status: COMPLETED | OUTPATIENT
Start: 2018-09-13 | End: 2018-09-13

## 2018-09-13 RX ORDER — POTASSIUM CHLORIDE 20 MEQ/1
20 TABLET, EXTENDED RELEASE ORAL 2 TIMES DAILY
Status: ON HOLD | COMMUNITY
End: 2018-09-16 | Stop reason: HOSPADM

## 2018-09-13 RX ORDER — LISINOPRIL 10 MG/1
20 TABLET ORAL 2 TIMES DAILY
Status: DISCONTINUED | OUTPATIENT
Start: 2018-09-13 | End: 2018-09-16 | Stop reason: HOSPADM

## 2018-09-13 RX ORDER — LABETALOL HYDROCHLORIDE 5 MG/ML
10 INJECTION, SOLUTION INTRAVENOUS EVERY 6 HOURS PRN
Status: DISCONTINUED | OUTPATIENT
Start: 2018-09-13 | End: 2018-09-16 | Stop reason: HOSPADM

## 2018-09-13 RX ORDER — CALCIUM CHLORIDE 100 MG/ML
1 INJECTION INTRAVENOUS; INTRAVENTRICULAR ONCE
Status: COMPLETED | OUTPATIENT
Start: 2018-09-13 | End: 2018-09-13

## 2018-09-13 RX ORDER — SODIUM CHLORIDE 0.9 % (FLUSH) 0.9 %
10 SYRINGE (ML) INJECTION PRN
Status: DISCONTINUED | OUTPATIENT
Start: 2018-09-13 | End: 2018-09-16 | Stop reason: HOSPADM

## 2018-09-13 RX ADMIN — AMLODIPINE BESYLATE 5 MG: 5 TABLET ORAL at 21:10

## 2018-09-13 RX ADMIN — LABETALOL HYDROCHLORIDE 10 MG: 5 INJECTION, SOLUTION INTRAVENOUS at 19:03

## 2018-09-13 RX ADMIN — LISINOPRIL 20 MG: 10 TABLET ORAL at 21:10

## 2018-09-13 RX ADMIN — SODIUM POLYSTYRENE SULFONATE 15 G: 15 SUSPENSION ORAL; RECTAL at 18:02

## 2018-09-13 RX ADMIN — SODIUM BICARBONATE 50 MEQ: 84 INJECTION, SOLUTION INTRAVENOUS at 18:02

## 2018-09-13 RX ADMIN — Medication 10 ML: at 21:14

## 2018-09-13 RX ADMIN — SODIUM CHLORIDE 1000 ML: 9 INJECTION, SOLUTION INTRAVENOUS at 18:02

## 2018-09-13 RX ADMIN — DEXTROSE MONOHYDRATE 25 G: 25 INJECTION, SOLUTION INTRAVENOUS at 18:02

## 2018-09-13 RX ADMIN — INSULIN HUMAN 10 UNITS: 100 INJECTION, SOLUTION PARENTERAL at 18:02

## 2018-09-13 RX ADMIN — CALCIUM CHLORIDE 1 G: 100 INJECTION, SOLUTION INTRAVENOUS at 18:02

## 2018-09-13 RX ADMIN — IPRATROPIUM BROMIDE AND ALBUTEROL SULFATE 1 AMPULE: .5; 3 SOLUTION RESPIRATORY (INHALATION) at 18:00

## 2018-09-13 RX ADMIN — CLONIDINE HYDROCHLORIDE 0.1 MG: 0.1 TABLET ORAL at 21:10

## 2018-09-13 ASSESSMENT — ENCOUNTER SYMPTOMS
SHORTNESS OF BREATH: 0
RHINORRHEA: 0
ABDOMINAL PAIN: 0
VOMITING: 0
SORE THROAT: 0
DIARRHEA: 0
NAUSEA: 0
BACK PAIN: 0

## 2018-09-13 NOTE — ED PROVIDER NOTES
[address] [facility]  eMERGENCY dEPARTMENT eNCOUnter      Pt Name: Yamini Negrete  MRN: [id number]  [place] 1940  Date of evaluation: 9/13/2018  Provider: Tiffanie Lowery MD    [address] [place] [address]       Chief Complaint   Patient presents with    Abnormal Lab     sent for K+ 6.6 per Dr Kellie Beverly office         Monisha Alanis   (Location/Symptom, Timing/Onset, Context/Setting, Quality, Duration, Modifying Factors, Severity)  Note limiting factors. Yamini Negrete is a 77 y.o. female who presents to the emergency department With hyperkalemia. The patient has a history of chronic kidney disease was not on dialysis. She sees Dr. Kellie Beverly. She said about a month ago he increased her potassium supplementation and she takes 20 meq 3 times a day. She had her lab work done yesterday and her potassium came back at 6.6 today and she was told to come here. The patient states she has taken her potassium capsules today and had a baked potato for lunch. She denies any chest pain or any other symptoms other than some cramping in her bilateral hands. HPI    Nursing Notes were reviewed. REVIEW OF SYSTEMS    (2-9 systems for level 4, 10 or more for level 5)     Review of Systems   Constitutional: Negative for chills and fever. HENT: Negative for rhinorrhea and sore throat. Respiratory: Negative for shortness of breath. Cardiovascular: Negative for chest pain and leg swelling. Gastrointestinal: Negative for abdominal pain, diarrhea, nausea and vomiting. Genitourinary: Negative for difficulty urinating. Musculoskeletal: Negative for back pain and neck pain. Skin: Negative for rash. Neurological: Negative for weakness and headaches. Psychiatric/Behavioral: Negative for confusion. A complete review of systems was performed and is negative except as noted above in the HPI.        PAST MEDICAL HISTORY     Past Medical History:   Diagnosis Date    Acid reflux     Anemia     Chronic kidney disease

## 2018-09-13 NOTE — ED NOTES
ASSESSMENT:    PT ALERT/ORIENTED X4. PUPILS EQUAL/REACTIVE    SKIN:  WARM/DRY PINK CAPILLARY REFILL < 2SECS    CARDIAC:  S1 S2 NOTED     LUNGS: CLEAR UPPER AND LOWER LOBES, RESPIRATIONS EVEN/UNLABORED     ABDOMEN: BOWEL SOUNDS NOTED UPPER AND LOWER QUADRANTS                     SOFT AND NONTENDER. EXTREMITIES:  BILATERAL DP AND PT AND NO EDEMA NOTED. NO DISTRESS NOTED. SIDE RAILS UP AND CALL LIGHT IN REACH.        Bill Caballero RN  09/13/18 9313

## 2018-09-14 PROBLEM — N17.9 ACUTE KIDNEY INJURY (NONTRAUMATIC) (HCC): Status: ACTIVE | Noted: 2018-09-14

## 2018-09-14 LAB
ANION GAP SERPL CALCULATED.3IONS-SCNC: 12 MMOL/L (ref 7–19)
BUN BLDV-MCNC: 43 MG/DL (ref 8–23)
CALCIUM SERPL-MCNC: 8.7 MG/DL (ref 8.8–10.2)
CHLORIDE BLD-SCNC: 111 MMOL/L (ref 98–111)
CO2: 19 MMOL/L (ref 22–29)
CREAT SERPL-MCNC: 3.3 MG/DL (ref 0.5–0.9)
FERRITIN: 27.1 NG/ML (ref 13–150)
GFR NON-AFRICAN AMERICAN: 13
GLUCOSE BLD-MCNC: 75 MG/DL (ref 74–109)
HCT VFR BLD CALC: 29.8 % (ref 37–47)
HEMOGLOBIN: 8.7 G/DL (ref 12–16)
IRON SATURATION: 16 % (ref 14–50)
IRON: 40 UG/DL (ref 37–145)
MCH RBC QN AUTO: 29.8 PG (ref 27–31)
MCHC RBC AUTO-ENTMCNC: 29.2 G/DL (ref 33–37)
MCV RBC AUTO: 102.1 FL (ref 81–99)
PDW BLD-RTO: 14.4 % (ref 11.5–14.5)
PLATELET # BLD: 209 K/UL (ref 130–400)
PMV BLD AUTO: 9.7 FL (ref 9.4–12.3)
POTASSIUM REFLEX MAGNESIUM: 5.4 MMOL/L (ref 3.5–5)
RBC # BLD: 2.92 M/UL (ref 4.2–5.4)
SODIUM BLD-SCNC: 142 MMOL/L (ref 136–145)
TOTAL IRON BINDING CAPACITY: 253 UG/DL (ref 250–400)
WBC # BLD: 6.7 K/UL (ref 4.8–10.8)

## 2018-09-14 PROCEDURE — G8979 MOBILITY GOAL STATUS: HCPCS

## 2018-09-14 PROCEDURE — 2500000003 HC RX 250 WO HCPCS: Performed by: INTERNAL MEDICINE

## 2018-09-14 PROCEDURE — 99231 SBSQ HOSP IP/OBS SF/LOW 25: CPT | Performed by: INTERNAL MEDICINE

## 2018-09-14 PROCEDURE — 82728 ASSAY OF FERRITIN: CPT

## 2018-09-14 PROCEDURE — G8978 MOBILITY CURRENT STATUS: HCPCS

## 2018-09-14 PROCEDURE — 96376 TX/PRO/DX INJ SAME DRUG ADON: CPT

## 2018-09-14 PROCEDURE — 2580000003 HC RX 258: Performed by: INTERNAL MEDICINE

## 2018-09-14 PROCEDURE — 85027 COMPLETE CBC AUTOMATED: CPT

## 2018-09-14 PROCEDURE — 6370000000 HC RX 637 (ALT 250 FOR IP): Performed by: INTERNAL MEDICINE

## 2018-09-14 PROCEDURE — 1210000000 HC MED SURG R&B

## 2018-09-14 PROCEDURE — 97161 PT EVAL LOW COMPLEX 20 MIN: CPT

## 2018-09-14 PROCEDURE — 36415 COLL VENOUS BLD VENIPUNCTURE: CPT

## 2018-09-14 PROCEDURE — 83540 ASSAY OF IRON: CPT

## 2018-09-14 PROCEDURE — 97750 PHYSICAL PERFORMANCE TEST: CPT

## 2018-09-14 PROCEDURE — G8980 MOBILITY D/C STATUS: HCPCS

## 2018-09-14 PROCEDURE — 6360000002 HC RX W HCPCS: Performed by: INTERNAL MEDICINE

## 2018-09-14 PROCEDURE — 80048 BASIC METABOLIC PNL TOTAL CA: CPT

## 2018-09-14 PROCEDURE — 83550 IRON BINDING TEST: CPT

## 2018-09-14 RX ORDER — CLONIDINE HYDROCHLORIDE 0.1 MG/1
0.1 TABLET ORAL 2 TIMES DAILY
Status: DISCONTINUED | OUTPATIENT
Start: 2018-09-14 | End: 2018-09-15

## 2018-09-14 RX ORDER — SODIUM BICARBONATE 650 MG/1
650 TABLET ORAL 2 TIMES DAILY
Status: DISCONTINUED | OUTPATIENT
Start: 2018-09-14 | End: 2018-09-14

## 2018-09-14 RX ORDER — BUMETANIDE 1 MG/1
2 TABLET ORAL 2 TIMES DAILY
Status: DISCONTINUED | OUTPATIENT
Start: 2018-09-14 | End: 2018-09-15

## 2018-09-14 RX ORDER — TACROLIMUS 0.5 MG/1
1 CAPSULE ORAL 2 TIMES DAILY
Status: DISCONTINUED | OUTPATIENT
Start: 2018-09-14 | End: 2018-09-16 | Stop reason: HOSPADM

## 2018-09-14 RX ORDER — SODIUM BICARBONATE 650 MG/1
650 TABLET ORAL 3 TIMES DAILY
Status: DISCONTINUED | OUTPATIENT
Start: 2018-09-14 | End: 2018-09-16 | Stop reason: HOSPADM

## 2018-09-14 RX ORDER — LEVOTHYROXINE SODIUM 0.05 MG/1
50 TABLET ORAL DAILY
Status: DISCONTINUED | OUTPATIENT
Start: 2018-09-14 | End: 2018-09-16 | Stop reason: HOSPADM

## 2018-09-14 RX ORDER — AMLODIPINE BESYLATE 10 MG/1
10 TABLET ORAL DAILY
Status: DISCONTINUED | OUTPATIENT
Start: 2018-09-15 | End: 2018-09-16 | Stop reason: HOSPADM

## 2018-09-14 RX ADMIN — LISINOPRIL 20 MG: 10 TABLET ORAL at 09:14

## 2018-09-14 RX ADMIN — Medication 10 ML: at 09:19

## 2018-09-14 RX ADMIN — SODIUM BICARBONATE 650 MG: 650 TABLET ORAL at 10:51

## 2018-09-14 RX ADMIN — CLONIDINE HYDROCHLORIDE 0.1 MG: 0.1 TABLET ORAL at 20:17

## 2018-09-14 RX ADMIN — LEVOTHYROXINE SODIUM 50 MCG: 50 TABLET ORAL at 09:13

## 2018-09-14 RX ADMIN — TACROLIMUS 1 MG: 0.5 CAPSULE ORAL at 20:17

## 2018-09-14 RX ADMIN — SODIUM BICARBONATE 650 MG: 650 TABLET ORAL at 09:13

## 2018-09-14 RX ADMIN — SODIUM BICARBONATE: 84 INJECTION, SOLUTION INTRAVENOUS at 10:51

## 2018-09-14 RX ADMIN — SODIUM BICARBONATE: 84 INJECTION, SOLUTION INTRAVENOUS at 23:59

## 2018-09-14 RX ADMIN — TACROLIMUS 1 MG: 0.5 CAPSULE ORAL at 09:10

## 2018-09-14 RX ADMIN — SODIUM BICARBONATE 650 MG: 650 TABLET ORAL at 20:16

## 2018-09-14 RX ADMIN — BUMETANIDE 2 MG: 1 TABLET ORAL at 09:12

## 2018-09-14 RX ADMIN — AMLODIPINE BESYLATE 5 MG: 5 TABLET ORAL at 09:13

## 2018-09-14 RX ADMIN — BUMETANIDE 2 MG: 1 TABLET ORAL at 20:16

## 2018-09-14 RX ADMIN — CLONIDINE HYDROCHLORIDE 0.1 MG: 0.1 TABLET ORAL at 09:13

## 2018-09-14 RX ADMIN — LISINOPRIL 20 MG: 10 TABLET ORAL at 20:16

## 2018-09-14 NOTE — CARE COORDINATION
[address] Transitions Interview     2018    Patient: Lola Lindsey Patient : 1940   MRN: [id number]  Reason for Admission: There are no discharge diagnoses documented for the most recent discharge. RARS: Readmission Risk Score: 0       Spoke with: Lola Lindsey      Readmission Risk  Patient Active Problem List   Diagnosis    Encounter for colonoscopy due to history of adenomatous colonic polyps    Hypothyroidism    B12 deficiency    Hyponatremia    Membranous glomerulonephritis    Membranous proliferative glomerulonephritis    Mitral valve prolapse    Hypertension    Acid reflux    Hyperkalemia       Inpatient Assessment  Care Transitions Summary    Care Transitions Inpatient Review  Medication Review  Do you have all of your prescriptions and are they filled?:  Yes   Are you able to afford your medications?:  Yes  How often do you have difficulty taking your medications?:  I always take them as prescribed. Housing Review  Who do you live with?:  Alone  Are you an active caregiver in your home?:  No  Social Support  Do you have a ?:  No  Do you have a [facility]?:  No  Durable Medical Equipment  Patient DME:  Quad cane, Walker  Functional Review  Ability to seek help/take action for Emergent/Urgent situations i.e. fire, crime, inclement weather or health crisis. :  Independent  Ability handle personal hygiene needs (bathing/dressing/grooming): Independent  Ability to manage medications: Independent  Ability to prepare food:  Independent  Ability to maintain home (clean home, laundry): Independent  Ability to drive and/or has transportation:  Independent  Ability to do shopping:  Independent  Ability to manage finances:   Independent  Is patient able to live independently?:  Yes  Hearing and Vision  Visual Impairment:  Reading glasses  Hearing Impairment:  None  Care Transitions Interventions         Follow Up: Met at bedside with patient and

## 2018-09-14 NOTE — H&P
LMP  (LMP Unknown)   SpO2 98%   BMI 22.13 kg/m²   24HR INTAKE/OUTPUT:    Intake/Output Summary (Last 24 hours) at 09/13/18 2347  Last data filed at 09/13/18 2114   Gross per 24 hour   Intake               10 ml   Output                0 ml   Net               10 ml     General appearance: alert and cooperative with exam  HEENT: atraumatic, eyes with clear conjunctiva and normal lids, pupils and irises normal, external ears and nose are normal, lips normal. Neck without masses, lympadenopathy, bruit, thyroid normal  Lungs: no increased work of breathing, clear to auscultation bilaterally without rales, rhonchi or wheezes  Heart: regular rate and rhythm, S1, S2 normal, no murmur, click, rub or gallop  Abdomen: soft, non-tender; bowel sounds normal; no masses,  no organomegaly  Extremities: extremities normal without clubbing, atraumatic, no cyanosis or edema  Neurologic: No focal neurologic deficits, normal sensation, alert and oriented, affect and mood appropriate. Skin: no rashes, nodules. CBC:   Recent Labs      09/13/18   1705   WBC  5.9   HGB  10.6*   PLT  257     BMP:  Recent Labs      09/13/18   1705  09/13/18   1843   NA  138   --    K  6.4*  5.3*   CL  105   --    CO2  19*   --    BUN  43*   --    CREATININE  3.1*   --    GLUCOSE  93   --      Hepatic: Recent Labs      09/13/18   1705   AST  12   ALT  5   BILITOT  <0.2   ALKPHOS  110*     Troponin T: No results for input(s): TROPONINI in the last 72 hours. Pro-BNP: No results for input(s): BNP in the last 72 hours. Lipids: No results for input(s): CHOL, HDL in the last 72 hours. Invalid input(s): LDLCALCU  ABGs: No results for input(s): PHART, CJN2HVD, PO2ART, WWE4DFI, BEART, HGBAE, Z5NLECBT, CARBOXHGBART, 02THERAPY in the last 72 hours. INR: No results for input(s): INR in the last 72 hours. A1c:Invalid input(s):  HEMOGLOBIN A1C      Assessment and Plan   Principal Problem:  #  Hyperkalemia  - Patient received insulin, bicarab,calcium, and

## 2018-09-14 NOTE — ED NOTES
Pt assigned room, waiting for room to be ready     Merissa Pichardo [facility]  09/13/18 1925

## 2018-09-14 NOTE — CONSULTS
Specified)   Problem: Breast Cancer      Relation: Maternal Aunt       Age of Onset: (Not Specified)   Problem: Colon Cancer      Relation: Neg Hx       Age of Onset: (Not Specified)       Social History  Social History    Marital status:              Spouse name:                       Years of education:                 Number of children:               Occupational History    None on file    Social History Main Topics    Smoking status: Never Smoker                                                                Smokeless tobacco: Never Used                        Alcohol use: No              Drug use: No              Sexual activity: No                   Other Topics            Concern    None on file    Social History Narrative    None on file          Review of Systems:  History obtained from chart review and the patient  General ROS: No fever or chills  Respiratory ROS: No cough, shortness of breath, wheezing  Cardiovascular ROS: No chest pain or palpitations  Gastrointestinal ROS: No abdominal pain or melena  Genito-Urinary ROS: No dysuria or hematuria  Musculoskeletal ROS: No joint pain or swelling   14 point ROS reviewed with the patient and negative except as noted above and in the HPI unless unable to obtain. Objective:  Blood pressure (!) 175/88, pulse 74, temperature 98.9 °F (37.2 °C), temperature source Temporal, resp. rate 16, height 5' 2\" (1.575 m), weight 121 lb (54.9 kg), SpO2 98 %, not currently breastfeeding.   Intake/Output Summary (Last 24 hours) at 09/14/18 1006  Last data filed at 09/14/18 0909          Gross per 24 hour  Intake:              185 ml  Output:                0 ml  Net   :              185 ml  General: awake/alert   HEENT: Normocephalic atraumatic head  Neck: Supple with no JVD  Chest:  clear to auscultation bilaterally without respiratory distress  CVS: regular rate and rhythm  Abdominal: soft, nontender, normal bowel sounds  Extremities: no cyanosis or edema  Skin:

## 2018-09-14 NOTE — PROGRESS NOTES
chills  Cardiovascular: No chest pain or palpitations  Pulmonary: No shortness of breath or productive coughing  Gastrointestinal: No abdominal pain, nausea, vomiting or diarrhea  Neurologic: No focal numbness or weakness    Objective     General: in no distress  Pulmonary: Lungs clear, unlabored breathing  Cardiovascular: Heart regular without murmur, no peripheral edema  Gastrointestinal: Abdomen soft, nontender, no guarding or masses  Neurologic: Alert, no focal motor deficits  Skin: Warm and dry. No rash. Assessment & Plan     Hyperkalemia: Improved. As per nephrology    Acute kidney injury / chronic kidney disease stage IV: As per nephrology. Patient currently on IV fluids containing sodium bicarbonate.     Chronic anemia: Stable    Angela Gallardo MD  9/14/2018

## 2018-09-15 LAB
ALBUMIN SERPL-MCNC: 2.9 G/DL (ref 3.5–5.2)
ALP BLD-CCNC: 83 U/L (ref 35–104)
ALT SERPL-CCNC: <5 U/L (ref 5–33)
ANION GAP SERPL CALCULATED.3IONS-SCNC: 12 MMOL/L (ref 7–19)
AST SERPL-CCNC: 12 U/L (ref 5–32)
BILIRUB SERPL-MCNC: 0.3 MG/DL (ref 0.2–1.2)
BUN BLDV-MCNC: 43 MG/DL (ref 8–23)
CALCIUM SERPL-MCNC: 8.1 MG/DL (ref 8.8–10.2)
CHLORIDE BLD-SCNC: 105 MMOL/L (ref 98–111)
CO2: 23 MMOL/L (ref 22–29)
CREAT SERPL-MCNC: 3.4 MG/DL (ref 0.5–0.9)
GFR NON-AFRICAN AMERICAN: 13
GLUCOSE BLD-MCNC: 88 MG/DL (ref 74–109)
HCT VFR BLD CALC: 30.6 % (ref 37–47)
HEMOGLOBIN: 9 G/DL (ref 12–16)
MCH RBC QN AUTO: 29.9 PG (ref 27–31)
MCHC RBC AUTO-ENTMCNC: 29.4 G/DL (ref 33–37)
MCV RBC AUTO: 101.7 FL (ref 81–99)
PDW BLD-RTO: 14.5 % (ref 11.5–14.5)
PLATELET # BLD: 214 K/UL (ref 130–400)
PMV BLD AUTO: 10.1 FL (ref 9.4–12.3)
POTASSIUM SERPL-SCNC: 5.1 MMOL/L (ref 3.5–5)
RBC # BLD: 3.01 M/UL (ref 4.2–5.4)
SODIUM BLD-SCNC: 140 MMOL/L (ref 136–145)
TOTAL PROTEIN: 5.2 G/DL (ref 6.6–8.7)
WBC # BLD: 5 K/UL (ref 4.8–10.8)

## 2018-09-15 PROCEDURE — 36415 COLL VENOUS BLD VENIPUNCTURE: CPT

## 2018-09-15 PROCEDURE — 6370000000 HC RX 637 (ALT 250 FOR IP): Performed by: INTERNAL MEDICINE

## 2018-09-15 PROCEDURE — 85027 COMPLETE CBC AUTOMATED: CPT

## 2018-09-15 PROCEDURE — 2500000003 HC RX 250 WO HCPCS: Performed by: INTERNAL MEDICINE

## 2018-09-15 PROCEDURE — 2580000003 HC RX 258: Performed by: INTERNAL MEDICINE

## 2018-09-15 PROCEDURE — 99232 SBSQ HOSP IP/OBS MODERATE 35: CPT | Performed by: INTERNAL MEDICINE

## 2018-09-15 PROCEDURE — 6360000002 HC RX W HCPCS: Performed by: INTERNAL MEDICINE

## 2018-09-15 PROCEDURE — 80053 COMPREHEN METABOLIC PANEL: CPT

## 2018-09-15 PROCEDURE — 1210000000 HC MED SURG R&B

## 2018-09-15 RX ORDER — BUMETANIDE 1 MG/1
1 TABLET ORAL DAILY
Status: DISCONTINUED | OUTPATIENT
Start: 2018-09-16 | End: 2018-09-16

## 2018-09-15 RX ORDER — CLONIDINE HYDROCHLORIDE 0.1 MG/1
0.1 TABLET ORAL 4 TIMES DAILY
Status: DISCONTINUED | OUTPATIENT
Start: 2018-09-15 | End: 2018-09-16

## 2018-09-15 RX ADMIN — LISINOPRIL 20 MG: 10 TABLET ORAL at 08:38

## 2018-09-15 RX ADMIN — Medication 10 ML: at 08:37

## 2018-09-15 RX ADMIN — SODIUM BICARBONATE 650 MG: 650 TABLET ORAL at 08:37

## 2018-09-15 RX ADMIN — AMLODIPINE BESYLATE 10 MG: 10 TABLET ORAL at 07:58

## 2018-09-15 RX ADMIN — CLONIDINE HYDROCHLORIDE 0.1 MG: 0.1 TABLET ORAL at 08:37

## 2018-09-15 RX ADMIN — LISINOPRIL 20 MG: 10 TABLET ORAL at 20:06

## 2018-09-15 RX ADMIN — CLONIDINE HYDROCHLORIDE 0.1 MG: 0.1 TABLET ORAL at 14:03

## 2018-09-15 RX ADMIN — TACROLIMUS 1 MG: 0.5 CAPSULE ORAL at 20:06

## 2018-09-15 RX ADMIN — CLONIDINE HYDROCHLORIDE 0.1 MG: 0.1 TABLET ORAL at 18:30

## 2018-09-15 RX ADMIN — LEVOTHYROXINE SODIUM 50 MCG: 50 TABLET ORAL at 07:57

## 2018-09-15 RX ADMIN — TACROLIMUS 1 MG: 0.5 CAPSULE ORAL at 08:38

## 2018-09-15 RX ADMIN — SODIUM BICARBONATE: 84 INJECTION, SOLUTION INTRAVENOUS at 16:04

## 2018-09-15 RX ADMIN — SODIUM BICARBONATE 650 MG: 650 TABLET ORAL at 13:55

## 2018-09-15 RX ADMIN — BUMETANIDE 2 MG: 1 TABLET ORAL at 08:37

## 2018-09-15 RX ADMIN — SODIUM BICARBONATE: 84 INJECTION, SOLUTION INTRAVENOUS at 08:31

## 2018-09-15 RX ADMIN — SODIUM BICARBONATE 650 MG: 650 TABLET ORAL at 20:06

## 2018-09-15 ASSESSMENT — PAIN SCALES - GENERAL
PAINLEVEL_OUTOF10: 0
PAINLEVEL_OUTOF10: 0

## 2018-09-15 NOTE — PROGRESS NOTES
currently breastfeeding. Subjective   No new complaints    Review of systems:  Gen.: No fever or chills  Cardiovascular: No chest pain or palpitations  Pulmonary: No shortness of breath or productive coughing  Gastrointestinal: No abdominal pain, nausea, vomiting or diarrhea  Neurologic: No focal numbness or weakness    Objective     General: in no distress  Pulmonary: Lungs clear, unlabored breathing  Cardiovascular: Heart regular without murmur, no peripheral edema  Gastrointestinal: Abdomen soft, nontender, no guarding or masses  Neurologic: Alert, no focal motor deficits  Skin: Warm and dry. No rash. Assessment & Plan        Hyperkalemia: Improved. As per nephrology     Acute kidney injury / chronic kidney disease stage IV: Stable. As per nephrology. Patient continues on IV fluid containing sodium bicarbonate. Metabolic acidosis has resolved.     Chronic anemia: Stable    Hypertension: Uncontrolled.     Zelalem Taveras MD  9/15/2018

## 2018-09-15 NOTE — PROGRESS NOTES
Nephrology ([address] [facility]) Progress Note      Patient:  Lola Lindsey  YOB: 1940  Date of Service: 9/15/2018  MRN: [id number]   Acct: [de-identified]   Primary Care Physician: José Miguel Campos DO  Advance Directive: Full Code  Admit Date: 9/13/2018       Hospital Day: 1  Referring Provider: Gigner Villafana MD    Patient independently seen and examined, Chart, Consults, Notes, Operative notes, Labs, Cardiology, and Radiology studies reviewed as able. Subjective:  Lola Lindsey is a 77 y.o. female  whom we were consulted for acute kidney injury/chronic kidney disease/hyperkalemia. Patient has history of idiopathic membranous glomerulonephritis and has been on several different medications. Patient has shown resistance to Acthar, CellCept and currently she is on Prograf. She presented with severe hyperkalemia/acute kidney injury. She has responded very well to Kayexalate and stopping potassium supplement. Her renal function is slowly improving with IV fluid. This morning she is feeling much better but blood pressure is still very high.     Allergies:  Metolazone and Statins    Medicines:  Current Facility-Administered Medications   Medication Dose Route Frequency Provider Last Rate Last Dose    bumetanide (BUMEX) tablet 2 mg  2 mg Oral BID Miguel Flores MD   2 mg at 09/15/18 0240    cloNIDine (CATAPRES) tablet 0.1 mg  0.1 mg Oral BID Miguel Flores MD   0.1 mg at 09/15/18 0220    levothyroxine (SYNTHROID) tablet 50 mcg  50 mcg Oral Daily Miguel Flores MD   50 mcg at 09/15/18 0757    tacrolimus (proGRAF) capsule 1 mg  1 mg Oral BID Miguel lFores MD   1 mg at 09/15/18 0845    sodium bicarbonate 75 mEq in sodium chloride 0.45 % 1,000 mL infusion   Intravenous Continuous Ron Guzmán  mL/hr at 09/15/18 0831      sodium bicarbonate tablet 650 mg  650 mg Oral TID Ron Guzmán MD   650 mg at 09/15/18 0837    amLODIPine (NORVASC) tablet 10 mg

## 2018-09-16 VITALS
HEIGHT: 62 IN | BODY MASS INDEX: 22.26 KG/M2 | TEMPERATURE: 98.7 F | RESPIRATION RATE: 18 BRPM | WEIGHT: 121 LBS | SYSTOLIC BLOOD PRESSURE: 173 MMHG | OXYGEN SATURATION: 96 % | DIASTOLIC BLOOD PRESSURE: 90 MMHG | HEART RATE: 73 BPM

## 2018-09-16 LAB
ALBUMIN SERPL-MCNC: 2.6 G/DL (ref 3.5–5.2)
ALP BLD-CCNC: 74 U/L (ref 35–104)
ALT SERPL-CCNC: <5 U/L (ref 5–33)
ANION GAP SERPL CALCULATED.3IONS-SCNC: 12 MMOL/L (ref 7–19)
AST SERPL-CCNC: 10 U/L (ref 5–32)
BILIRUB SERPL-MCNC: <0.2 MG/DL (ref 0.2–1.2)
BUN BLDV-MCNC: 36 MG/DL (ref 8–23)
CALCIUM SERPL-MCNC: 8 MG/DL (ref 8.8–10.2)
CHLORIDE BLD-SCNC: 100 MMOL/L (ref 98–111)
CO2: 27 MMOL/L (ref 22–29)
CREAT SERPL-MCNC: 3.1 MG/DL (ref 0.5–0.9)
GFR NON-AFRICAN AMERICAN: 14
GLUCOSE BLD-MCNC: 103 MG/DL (ref 74–109)
HCT VFR BLD CALC: 28.8 % (ref 37–47)
HEMOGLOBIN: 8.6 G/DL (ref 12–16)
MCH RBC QN AUTO: 29.6 PG (ref 27–31)
MCHC RBC AUTO-ENTMCNC: 29.9 G/DL (ref 33–37)
MCV RBC AUTO: 99 FL (ref 81–99)
PDW BLD-RTO: 14.2 % (ref 11.5–14.5)
PLATELET # BLD: 181 K/UL (ref 130–400)
PMV BLD AUTO: 9.7 FL (ref 9.4–12.3)
POTASSIUM SERPL-SCNC: 3.7 MMOL/L (ref 3.5–5)
RBC # BLD: 2.91 M/UL (ref 4.2–5.4)
SODIUM BLD-SCNC: 139 MMOL/L (ref 136–145)
TOTAL PROTEIN: 4.7 G/DL (ref 6.6–8.7)
WBC # BLD: 4.5 K/UL (ref 4.8–10.8)

## 2018-09-16 PROCEDURE — 80053 COMPREHEN METABOLIC PANEL: CPT

## 2018-09-16 PROCEDURE — 2580000003 HC RX 258: Performed by: INTERNAL MEDICINE

## 2018-09-16 PROCEDURE — 36415 COLL VENOUS BLD VENIPUNCTURE: CPT

## 2018-09-16 PROCEDURE — 80197 ASSAY OF TACROLIMUS: CPT

## 2018-09-16 PROCEDURE — 2500000003 HC RX 250 WO HCPCS: Performed by: INTERNAL MEDICINE

## 2018-09-16 PROCEDURE — 6370000000 HC RX 637 (ALT 250 FOR IP): Performed by: INTERNAL MEDICINE

## 2018-09-16 PROCEDURE — 99238 HOSP IP/OBS DSCHRG MGMT 30/<: CPT | Performed by: INTERNAL MEDICINE

## 2018-09-16 PROCEDURE — 6360000002 HC RX W HCPCS: Performed by: INTERNAL MEDICINE

## 2018-09-16 PROCEDURE — 85027 COMPLETE CBC AUTOMATED: CPT

## 2018-09-16 RX ORDER — AMLODIPINE BESYLATE 10 MG/1
10 TABLET ORAL DAILY
Qty: 30 TABLET | Refills: 0 | Status: SHIPPED | OUTPATIENT
Start: 2018-09-17 | End: 2019-06-18 | Stop reason: DRUGHIGH

## 2018-09-16 RX ORDER — CLONIDINE HYDROCHLORIDE 0.2 MG/1
0.2 TABLET ORAL 3 TIMES DAILY
Qty: 90 TABLET | Refills: 0 | Status: SHIPPED | OUTPATIENT
Start: 2018-09-16 | End: 2019-06-18 | Stop reason: DRUGHIGH

## 2018-09-16 RX ORDER — CLONIDINE HYDROCHLORIDE 0.1 MG/1
0.2 TABLET ORAL 3 TIMES DAILY
Status: DISCONTINUED | OUTPATIENT
Start: 2018-09-16 | End: 2018-09-16 | Stop reason: HOSPADM

## 2018-09-16 RX ORDER — BUMETANIDE 1 MG/1
2 TABLET ORAL DAILY
Status: DISCONTINUED | OUTPATIENT
Start: 2018-09-17 | End: 2018-09-16 | Stop reason: HOSPADM

## 2018-09-16 RX ADMIN — Medication 10 ML: at 07:34

## 2018-09-16 RX ADMIN — LABETALOL HYDROCHLORIDE 10 MG: 5 INJECTION INTRAVENOUS at 07:29

## 2018-09-16 RX ADMIN — BUMETANIDE 1 MG: 1 TABLET ORAL at 08:53

## 2018-09-16 RX ADMIN — CLONIDINE HYDROCHLORIDE 0.1 MG: 0.1 TABLET ORAL at 08:53

## 2018-09-16 RX ADMIN — LEVOTHYROXINE SODIUM 50 MCG: 50 TABLET ORAL at 08:53

## 2018-09-16 RX ADMIN — TACROLIMUS 1 MG: 0.5 CAPSULE ORAL at 08:53

## 2018-09-16 RX ADMIN — SODIUM BICARBONATE 650 MG: 650 TABLET ORAL at 13:57

## 2018-09-16 RX ADMIN — LISINOPRIL 20 MG: 10 TABLET ORAL at 08:53

## 2018-09-16 RX ADMIN — CLONIDINE HYDROCHLORIDE 0.1 MG: 0.1 TABLET ORAL at 01:55

## 2018-09-16 RX ADMIN — AMLODIPINE BESYLATE 10 MG: 10 TABLET ORAL at 08:53

## 2018-09-16 RX ADMIN — SODIUM BICARBONATE 650 MG: 650 TABLET ORAL at 08:53

## 2018-09-16 RX ADMIN — SODIUM BICARBONATE: 84 INJECTION, SOLUTION INTRAVENOUS at 08:56

## 2018-09-16 RX ADMIN — CLONIDINE HYDROCHLORIDE 0.2 MG: 0.1 TABLET ORAL at 13:55

## 2018-09-16 NOTE — DISCHARGE SUMMARY
List      CONTINUE these medications which have CHANGED    Details   cloNIDine (CATAPRES) 0.2 MG tablet Take 1 tablet by mouth 3 times daily  Qty: 90 tablet, Refills: 0      amLODIPine (NORVASC) 10 MG tablet Take 1 tablet by mouth daily  Qty: 30 tablet, Refills: 0         CONTINUE these medications which have NOT CHANGED    Details   bumetanide (BUMEX) 1 MG tablet Take 2 mg by mouth 2 times daily      tacrolimus (PROGRAF) 1 MG capsule Take 1 mg by mouth 2 times daily      lisinopril (PRINIVIL;ZESTRIL) 20 MG tablet Take 1 tablet by mouth daily  Qty: 30 tablet, Refills: 0      levothyroxine (SYNTHROID) 50 MCG tablet TAKE ONE TABLET BY MOUTH DAILY  Qty: 30 tablet, Refills: 11    Associated Diagnoses: Hypothyroidism, unspecified type      sodium bicarbonate 325 MG tablet Take 650 mg by mouth 2 times daily       Multiple Vitamins-Minerals (PRESERVISION AREDS 2 PO) Take by mouth 2 times daily      Omega-3 Fatty Acids (FISH OIL) 1000 MG CPDR Take  by mouth 4 times daily. Cholecalciferol (VITAMIN D3) 2000 UNITS CAPS Take 5,000 Units by mouth 2 times daily       epoetin shailesh (EPOGEN) 65699 UNIT/ML injection Inject 60,000 Units into the skin every 30 days      Cyanocobalamin (VITAMIN B-12 IJ) Inject  as directed. STOP taking these medications       potassium chloride (KLOR-CON M) 20 MEQ extended release tablet Comments:   Reason for Stopping:         calcitRIOL (ROCALTROL) 0.25 MCG capsule Comments:   Reason for Stopping:             Activity: activity as tolerated  Diet: renal diet  Wound Care: none needed    Follow-up with PCP in 1 week.     Signed:  Benton Canada M.D.  9/16/2018  12:32 PM

## 2018-09-16 NOTE — PROGRESS NOTES
currently breastfeeding. Subjective   No complaints    Review of systems:  Gen.: No fever or chills  Cardiovascular: No chest pain or palpitations  Pulmonary: No shortness of breath or productive coughing  Gastrointestinal: No abdominal pain, nausea, vomiting or diarrhea  Neurologic: No focal numbness or weakness    Objective     General: in no distress  Pulmonary: Lungs clear, unlabored breathing  Cardiovascular: Heart regular without murmur, trace bilateral pedal edema  Gastrointestinal: Abdomen soft, nontender, no guarding or masses  Neurologic: Alert, no focal motor deficits  Skin: Warm and dry. No rash. Assessment & Plan      Hyperkalemia: Resolved.     Acute kidney injury / chronic kidney disease stage IV:  Improved. As per nephrology. Home today.     Chronic anemia: Stable     Hypertension: Uncontrolled. As per nephrology. Home on adjusted medications.     Germania Rangel MD  9/16/2018

## 2018-09-16 NOTE — PROGRESS NOTES
Nephrology ([facility]) Progress Note      Patient:  Kiet Young  YOB: 1940  Date of Service: 9/16/2018  MRN: [id number]   Acct: [de-identified]   Primary Care Physician: Keyanna Hudson DO  Advance Directive: Full Code  Admit Date: 9/13/2018       Hospital Day: 2  Referring Provider: Ruben Cunningham MD    Patient independently seen and examined, Chart, Consults, Notes, Operative notes, Labs, Cardiology, and Radiology studies reviewed as able. Subjective:  Kiet Young is a 77 y.o. female  whom we were consulted for acute kidney injury/chronic kidney disease/hyperkalemia. Patient has history of idiopathic membranous glomerulonephritis and has been on several different medications. Patient has shown resistance to Acthar, CellCept and currently she is on Prograf. She presented with severe hyperkalemia/acute kidney injury. She has responded very well to Kayexalate and stopping potassium supplement. Her renal function is slowly improving with IV fluid. Today she is feeling better and hoping to go home.     Allergies:  Metolazone and Statins    Medicines:  Current Facility-Administered Medications   Medication Dose Route Frequency Provider Last Rate Last Dose    cloNIDine (CATAPRES) tablet 0.1 mg  0.1 mg Oral 4x Daily Jaye Sánchez MD   0.1 mg at 09/16/18 0853    bumetanide (BUMEX) tablet 1 mg  1 mg Oral Daily Jaye Sánchez MD   1 mg at 09/16/18 0853    levothyroxine (SYNTHROID) tablet 50 mcg  50 mcg Oral Daily Kiel Hurt MD   50 mcg at 09/16/18 0853    tacrolimus (proGRAF) capsule 1 mg  1 mg Oral BID Kiel Hurt MD   1 mg at 09/16/18 0853    sodium bicarbonate 75 mEq in sodium chloride 0.45 % 1,000 mL infusion   Intravenous Continuous Jaye Sánchez  mL/hr at 09/16/18 0856      sodium bicarbonate tablet 650 mg  650 mg Oral TID Jaye Sánchez MD   650 mg at 09/16/18 0853    amLODIPine (NORVASC) tablet 10 mg  10 mg Oral Daily Jaye Sánchez MD 10 mg at 09/16/18 0853    lisinopril (PRINIVIL;ZESTRIL) tablet 20 mg  20 mg Oral BID Piter Abarca MD   20 mg at 09/16/18 5394    sodium chloride flush 0.9 % injection 10 mL  10 mL Intravenous 2 times per day Piter Abarca MD   10 mL at 09/16/18 0734    sodium chloride flush 0.9 % injection 10 mL  10 mL Intravenous PRN Piter Abarca MD        ondansetron [facility]) injection 4 mg  4 mg Intravenous Q6H PRN Piter Abarca MD        labetalol (NORMODYNE;TRANDATE) injection 10 mg  10 mg Intravenous Q6H PRN Piter Abarca MD   10 mg at 09/16/18 8951       Past Medical History:  Past Medical History:   Diagnosis Date    Acid reflux     Anemia     Chronic kidney disease     sees Dr. Taylor Dominguez Disease of blood and blood forming organ     Gout     Hypertension     Hypothyroidism     Mitral valve prolapse        Past Surgical History:  Past Surgical History:   Procedure Laterality Date    BREAST SURGERY      Left breast biopsy    CHOLECYSTECTOMY      COLONOSCOPY  2010    f/u 3-5 years Dr. Webb Public  1/22/2010    DR. SANCHEZ    HERNIA REPAIR      TUBAL LIGATION      UPPER GASTROINTESTINAL ENDOSCOPY  2/22/2006    DR. SANCHEZ       Family History  Family History   Problem Relation Age of Onset    Breast Cancer Mother 80    Hypertension Mother     Heart Disease Mother     Colon Polyps Mother     Hypertension Sister     Breast Cancer Maternal Aunt     Colon Cancer Neg Hx        Social History  Social History     Social History    Marital status:      Spouse name: N/A    Number of children: N/A    Years of education: N/A     Occupational History    Not on file.      Social History Main Topics    Smoking status: Never Smoker    Smokeless tobacco: Never Used    Alcohol use No    Drug use: No    Sexual activity: No     Other Topics Concern    Not on file     Social History Narrative    No narrative on file         Review of Systems:  History obtained from chart review and the patient  General ROS: No fever or chills  Respiratory ROS: No cough, shortness of breath, wheezing  Cardiovascular ROS: No chest pain or palpitations  Gastrointestinal ROS: No abdominal pain or melena  Genito-Urinary ROS: No dysuria or hematuria  Musculoskeletal ROS: No joint pain or swelling   14 point ROS reviewed with the patient and negative except as noted above and in the HPI unless unable to obtain. Objective:  Blood pressure (!) 173/90, pulse 73, temperature 98.7 °F (37.1 °C), temperature source Temporal, resp. rate 18, height 5' 2\" (1.575 m), weight 121 lb (54.9 kg), SpO2 96 %, not currently breastfeeding. Intake/Output Summary (Last 24 hours) at 09/16/18 1101  Last data filed at 09/16/18 1045   Gross per 24 hour   Intake             1320 ml   Output                0 ml   Net             1320 ml     General: awake/alert   HEENT: Normocephalic atraumatic head  Neck: Supple with no JVD or carotid bruits. Chest:  clear to auscultation bilaterally without respiratory distress  CVS: regular rate and rhythm  Abdominal: soft, nontender, normal bowel sounds  Extremities: no cyanosis or edema  Skin: warm and dry without rash      Labs:  BMP:   Recent Labs      09/13/18   1705   09/14/18   0422  09/15/18   0414  09/16/18   0426   NA  138   --   142  140  139   K  6.4*   < >  5.4*  5.1*  3.7   CL  105   --   111  105  100   CO2  19*   --   19*  23  27   PHOS  5.7*   --    --    --    --    BUN  43*   --   43*  43*  36*   CREATININE  3.1*   --   3.3*  3.4*  3.1*   CALCIUM  9.0   --   8.7*  8.1*  8.0*    < > = values in this interval not displayed.      CBC:   Recent Labs      09/14/18   0422  09/15/18   0414  09/16/18   0426   WBC  6.7  5.0  4.5*   HGB  8.7*  9.0*  8.6*   HCT  29.8*  30.6*  28.8*   MCV  102.1*  101.7*  99.0   PLT  209  214  181     LIVER PROFILE:   Recent Labs      09/13/18 1705 09/15/18   0414  09/16/18   0426   AST  12  12  10   ALT  5  <5*  <5*   BILITOT <0.2  0.3  <0.2   ALKPHOS  110*  83  74     PT/INR: No results for input(s): PROTIME, INR in the last 72 hours. APTT: No results for input(s): APTT in the last 72 hours. BNP:  No results for input(s): BNP in the last 72 hours. Ionized Calcium:No results for input(s): IONCA in the last 72 hours. Magnesium:  Recent Labs      09/13/18   1705   MG  1.9     Phosphorus:  Recent Labs      09/13/18   1705   PHOS  5.7*     HgbA1C: No results for input(s): LABA1C in the last 72 hours. Hepatic:   Recent Labs      09/13/18   1705  09/15/18   0414  09/16/18   0426   ALKPHOS  110*  83  74   ALT  5  <5*  <5*   AST  12  12  10   PROT  6.3*  5.2*  4.7*   BILITOT  <0.2  0.3  <0.2   LABALBU  3.7  2.9*  2.6*     Lactic Acid: No results for input(s): LACTA in the last 72 hours. Troponin: No results for input(s): CKTOTAL, CKMB, TROPONINT in the last 72 hours. ABGs: No results for input(s): PH, PCO2, PO2, HCO3, O2SAT in the last 72 hours. CRP:  No results for input(s): CRP in the last 72 hours. Sed Rate:  No results for input(s): SEDRATE in the last 72 hours. Cultures:   No results for input(s): CULTURE in the last 72 hours. No results for input(s): BC, Cloria Ivette in the last 72 hours. No results for input(s): CXSURG in the last 72 hours. Radiology reports as per the Radiologist  Radiology: No results found. Assessment   1. Acute kidney injury/volume depletion. 2. Stage IV chronic kidney disease baseline. 3. Hyperkalemia. 4. History of idiopathic membranous glomerulonephritis. 5. Metabolic acidemia. 6. Anemia and chronic kidney disease. 7. Poorly controlled hypertension. Plan:  1. Discontinue IV fluid  2. Adjust blood pressure medications. 3. Discharge to home today and follow-up needed next week.     Pedro Schmidt MD  09/16/18  11:01 AM

## 2018-09-16 NOTE — DISCHARGE INSTR - DIET

## 2018-09-17 ENCOUNTER — CARE COORDINATION (OUTPATIENT)
Dept: CASE MANAGEMENT | Age: 78
End: 2018-09-17

## 2018-09-17 DIAGNOSIS — E87.5 HYPERKALEMIA: Primary | ICD-10-CM

## 2018-09-17 LAB
EKG P AXIS: 58 DEGREES
EKG P-R INTERVAL: 144 MS
EKG Q-T INTERVAL: 358 MS
EKG QRS DURATION: 118 MS
EKG QTC CALCULATION (BAZETT): 394 MS
EKG T AXIS: 20 DEGREES

## 2018-09-18 LAB — TACROLIMUS BLOOD: 2.5 NG/ML

## 2018-09-20 ENCOUNTER — CARE COORDINATION (OUTPATIENT)
Dept: CASE MANAGEMENT | Age: 78
End: 2018-09-20

## 2018-10-02 ENCOUNTER — OFFICE VISIT (OUTPATIENT)
Dept: ONCOLOGY | Facility: CLINIC | Age: 78
End: 2018-10-02

## 2018-10-02 ENCOUNTER — INFUSION (OUTPATIENT)
Dept: ONCOLOGY | Facility: HOSPITAL | Age: 78
End: 2018-10-02
Attending: INTERNAL MEDICINE

## 2018-10-02 ENCOUNTER — LAB (OUTPATIENT)
Dept: LAB | Facility: HOSPITAL | Age: 78
End: 2018-10-02

## 2018-10-02 VITALS
RESPIRATION RATE: 16 BRPM | DIASTOLIC BLOOD PRESSURE: 81 MMHG | SYSTOLIC BLOOD PRESSURE: 176 MMHG | HEIGHT: 62 IN | OXYGEN SATURATION: 98 % | BODY MASS INDEX: 23.19 KG/M2 | HEART RATE: 73 BPM | WEIGHT: 126 LBS | TEMPERATURE: 97.1 F

## 2018-10-02 VITALS
BODY MASS INDEX: 23.19 KG/M2 | TEMPERATURE: 97.4 F | RESPIRATION RATE: 16 BRPM | DIASTOLIC BLOOD PRESSURE: 86 MMHG | OXYGEN SATURATION: 99 % | HEIGHT: 62 IN | HEART RATE: 76 BPM | SYSTOLIC BLOOD PRESSURE: 156 MMHG | WEIGHT: 126 LBS

## 2018-10-02 DIAGNOSIS — D63.1 ANEMIA OF CHRONIC KIDNEY FAILURE, STAGE 3 (MODERATE) (HCC): ICD-10-CM

## 2018-10-02 DIAGNOSIS — D63.1 ANEMIA OF CHRONIC RENAL FAILURE, STAGE 3 (MODERATE) (HCC): ICD-10-CM

## 2018-10-02 DIAGNOSIS — N18.30 ANEMIA OF CHRONIC KIDNEY FAILURE, STAGE 3 (MODERATE) (HCC): ICD-10-CM

## 2018-10-02 DIAGNOSIS — N18.30 ANEMIA OF CHRONIC RENAL FAILURE, STAGE 3 (MODERATE) (HCC): ICD-10-CM

## 2018-10-02 DIAGNOSIS — D63.1 ANEMIA OF CHRONIC RENAL FAILURE, STAGE 3 (MODERATE) (HCC): Primary | ICD-10-CM

## 2018-10-02 DIAGNOSIS — N18.30 ANEMIA OF CHRONIC RENAL FAILURE, STAGE 3 (MODERATE) (HCC): Primary | ICD-10-CM

## 2018-10-02 LAB
ALBUMIN SERPL-MCNC: 3.8 G/DL (ref 3.5–5)
ALBUMIN/GLOB SERPL: 1.3 G/DL (ref 1.1–2.5)
ALP SERPL-CCNC: 92 U/L (ref 24–120)
ALT SERPL W P-5'-P-CCNC: 17 U/L (ref 0–54)
ANION GAP SERPL CALCULATED.3IONS-SCNC: 10 MMOL/L (ref 4–13)
AST SERPL-CCNC: 20 U/L (ref 7–45)
BASOPHILS # BLD AUTO: 0.05 10*3/MM3 (ref 0–0.2)
BASOPHILS NFR BLD AUTO: 0.8 % (ref 0–2)
BILIRUB SERPL-MCNC: 0.3 MG/DL (ref 0.1–1)
BUN BLD-MCNC: 47 MG/DL (ref 5–21)
BUN/CREAT SERPL: 14.2 (ref 7–25)
CALCIUM SPEC-SCNC: 9 MG/DL (ref 8.4–10.4)
CHLORIDE SERPL-SCNC: 104 MMOL/L (ref 98–110)
CO2 SERPL-SCNC: 23 MMOL/L (ref 24–31)
CREAT BLD-MCNC: 3.3 MG/DL (ref 0.5–1.4)
DEPRECATED RDW RBC AUTO: 49 FL (ref 40–54)
EOSINOPHIL # BLD AUTO: 0.03 10*3/MM3 (ref 0–0.7)
EOSINOPHIL NFR BLD AUTO: 0.5 % (ref 0–4)
ERYTHROCYTE [DISTWIDTH] IN BLOOD BY AUTOMATED COUNT: 14 % (ref 12–15)
GFR SERPL CREATININE-BSD FRML MDRD: 13 ML/MIN/1.73
GFR SERPL CREATININE-BSD FRML MDRD: ABNORMAL ML/MIN/1.73
GLOBULIN UR ELPH-MCNC: 2.9 GM/DL
GLUCOSE BLD-MCNC: 81 MG/DL (ref 70–100)
HCT VFR BLD AUTO: 33.2 % (ref 37–47)
HGB BLD-MCNC: 10.4 G/DL (ref 12–16)
HOLD SPECIMEN: NORMAL
HOLD SPECIMEN: NORMAL
IMM GRANULOCYTES # BLD: 0.02 10*3/MM3 (ref 0–0.03)
IMM GRANULOCYTES NFR BLD: 0.3 % (ref 0–5)
LYMPHOCYTES # BLD AUTO: 2.2 10*3/MM3 (ref 0.72–4.86)
LYMPHOCYTES NFR BLD AUTO: 37.2 % (ref 15–45)
MCH RBC QN AUTO: 30.1 PG (ref 28–32)
MCHC RBC AUTO-ENTMCNC: 31.3 G/DL (ref 33–36)
MCV RBC AUTO: 96 FL (ref 82–98)
MONOCYTES # BLD AUTO: 0.55 10*3/MM3 (ref 0.19–1.3)
MONOCYTES NFR BLD AUTO: 9.3 % (ref 4–12)
NEUTROPHILS # BLD AUTO: 3.07 10*3/MM3 (ref 1.87–8.4)
NEUTROPHILS NFR BLD AUTO: 51.9 % (ref 39–78)
NRBC BLD MANUAL-RTO: 0 /100 WBC (ref 0–0)
PLATELET # BLD AUTO: 327 10*3/MM3 (ref 130–400)
PMV BLD AUTO: 10.1 FL (ref 6–12)
POTASSIUM BLD-SCNC: 4.2 MMOL/L (ref 3.5–5.3)
PROT SERPL-MCNC: 6.7 G/DL (ref 6.3–8.7)
RBC # BLD AUTO: 3.46 10*6/MM3 (ref 4.2–5.4)
SODIUM BLD-SCNC: 137 MMOL/L (ref 135–145)
WBC NRBC COR # BLD: 5.92 10*3/MM3 (ref 4.8–10.8)

## 2018-10-02 PROCEDURE — 85025 COMPLETE CBC W/AUTO DIFF WBC: CPT | Performed by: INTERNAL MEDICINE

## 2018-10-02 PROCEDURE — 96372 THER/PROPH/DIAG INJ SC/IM: CPT

## 2018-10-02 PROCEDURE — 63510000001 EPOETIN ALFA PER 1000 UNITS: Performed by: INTERNAL MEDICINE

## 2018-10-02 PROCEDURE — 36415 COLL VENOUS BLD VENIPUNCTURE: CPT

## 2018-10-02 PROCEDURE — 25010000002 CYANOCOBALAMIN PER 1000 MCG: Performed by: INTERNAL MEDICINE

## 2018-10-02 PROCEDURE — 99214 OFFICE O/P EST MOD 30 MIN: CPT | Performed by: INTERNAL MEDICINE

## 2018-10-02 PROCEDURE — 80053 COMPREHEN METABOLIC PANEL: CPT | Performed by: INTERNAL MEDICINE

## 2018-10-02 RX ORDER — BUMETANIDE 1 MG/1
2 TABLET ORAL
COMMUNITY
End: 2019-01-28 | Stop reason: DRUGHIGH

## 2018-10-02 RX ORDER — AMLODIPINE BESYLATE 10 MG/1
TABLET ORAL
COMMUNITY
Start: 2018-09-16

## 2018-10-02 RX ORDER — CYANOCOBALAMIN 1000 UG/ML
1000 INJECTION, SOLUTION INTRAMUSCULAR; SUBCUTANEOUS ONCE
Status: CANCELLED
Start: 2018-10-02 | End: 2018-10-02

## 2018-10-02 RX ORDER — CYANOCOBALAMIN 1000 UG/ML
1000 INJECTION, SOLUTION INTRAMUSCULAR; SUBCUTANEOUS ONCE
Status: COMPLETED | OUTPATIENT
Start: 2018-10-02 | End: 2018-10-02

## 2018-10-02 RX ORDER — CLONIDINE HYDROCHLORIDE 0.2 MG/1
0.2 TABLET ORAL
COMMUNITY
Start: 2018-09-16

## 2018-10-02 RX ADMIN — CYANOCOBALAMIN 1000 MCG: 1000 INJECTION, SOLUTION INTRAMUSCULAR at 11:19

## 2018-10-02 RX ADMIN — ERYTHROPOIETIN 40000 UNITS: 40000 INJECTION, SOLUTION INTRAVENOUS; SUBCUTANEOUS at 11:23

## 2018-10-02 NOTE — PROGRESS NOTES
Patient ID:   Susanne Valentine is a 78 y.o. female.  Referring Physician:   García Wood MD  [address] [facility] [address]  [place] [address]  Primary Care Provider:  Kaden Grande MD    Assessment/Plan:  Assessment   Patient Active Problem List   Diagnosis   • Anemia of chronic kidney failure   • Anemia of chronic renal failure, stage 3 (moderate) (CMS/HCC)   • B12 deficiency     Cancer Staging Information:  No matching staging information was found for the patient.    Diagnoses and all orders for this visit:    Anemia of chronic renal failure, stage 3 (moderate) (CMS/HCC)  -     Clinic Appointment Request  -     CBC and Differential; Future  -     Lab Appointment Request; Future  -     STAT Comprehensive Metabolic Panel; Future  -     Clinic Appointment Request; Future  -     ONCBCN INFUSION APPOINTMENT REQUEST 01; Future    Anemia of chronic kidney failure, stage 3 (moderate) (CMS/HCC)  -     Clinic Appointment Request  -     CBC and Differential; Future  -     Lab Appointment Request; Future  -     STAT Comprehensive Metabolic Panel; Future  -     Clinic Appointment Request; Future  -     ONCBCN INFUSION APPOINTMENT REQUEST 01; Future      The patient has a stable anemia from her chronic kidney disease, with the use of Procrit once a month as necessary.  We will hold the treatment today, and have her come back in 1 month with a repeat CBC, we will proceed with Procrit at 40,000 units subcutaneously if her hemoglobin is less than 10 g/DL.  Patient ID:   Susanne Valentine is a 78 y.o. female.  Referring Physician:   García Wood MD  [address] [facility] [address]  [place] [address]  Primary Care Provider:  Kaden Grande MD    Assessment/Plan:  Assessment   Patient Active Problem List   Diagnosis   • Anemia of chronic kidney failure   • Anemia of chronic renal failure, stage 3 (moderate) (CMS/HCC)   • B12 deficiency     Cancer Staging Information:  No matching staging information was  found for the patient.    Diagnoses and all orders for this visit:    Anemia of chronic renal failure, stage 3 (moderate) (CMS/formerly [facility])  -     Clinic Appointment Request  -     CBC and Differential; Future  -     Lab Appointment Request; Future  -     STAT Comprehensive Metabolic Panel; Future  -     Clinic Appointment Request; Future  -     ONCBCN INFUSION APPOINTMENT REQUEST 01; Future    Anemia of chronic kidney failure, stage 3 (moderate) (CMS/HCC)  -     Clinic Appointment Request  -     CBC and Differential; Future  -     Lab Appointment Request; Future  -     STAT Comprehensive Metabolic Panel; Future  -     Clinic Appointment Request; Future  -     ONCBCN INFUSION APPOINTMENT REQUEST 01; Future      The patient has a stable anemia from her chronic kidney disease, with the use of Procrit once a month as necessary.  We will hold the treatment today, and have her come back in 1 month with a repeat CBC, we will proceed with Procrit at 40,000 units subcutaneously if her hemoglobin is less than 10 g/DL.     Interval History:  The patient is here for follow-up and for possible consideration for Procrit.  She has no new complaints at this time and has been doing well.  Apparently her   5 years ago, and she reminded me that I am seeing her in the past when I was still here.  She did not get any Procrit on her last visit    Interval Notes:  The following portions of the patient's history were reviewed and updated as appropriate: allergies, current medications, past family history, past medical history, past social history, past surgical history and problem list.     History of Present Illness   76-year-old white lady with chronic kidney disease has an anemia and has been on ESAs since . She has been tolerating it well and her anemia has been stable with a supplement.    Review of Systems   Constitutional: Negative.    HENT: Negative.    Eyes: Negative.    Respiratory: Negative.    Cardiovascular: Negative.   "  Gastrointestinal: Negative.    Endocrine: Negative.    Genitourinary: Negative.    Musculoskeletal: Negative.    Allergic/Immunologic: Negative.    Neurological: Negative.    Hematological: Negative.    Psychiatric/Behavioral: Negative.         Physical Exam:  Vital Signs for this encounter:  BSA: 1.57 meters squared  /86   Pulse 76   Temp 97.4 °F (36.3 °C) (Tympanic)   Resp 16   Ht 157.5 cm (62.01\")   Wt 57.2 kg (126 lb)   SpO2 99%   BMI 23.04 kg/m²     Physical Exam   Constitutional: She is oriented to person, place, and time. She appears well-developed and well-nourished. No distress.   HENT:   Head: Normocephalic and atraumatic.   Nose: Nose normal.   Mouth/Throat: Oropharynx is clear and moist. No oropharyngeal exudate.   Eyes: Conjunctivae and EOM are normal. Pupils are equal, round, and reactive to light. No scleral icterus.   Neck: Normal range of motion. Neck supple. No JVD present. No thyromegaly present.   Cardiovascular: Normal rate, regular rhythm and normal heart sounds.  Exam reveals no gallop and no friction rub.    Pulmonary/Chest: Effort normal and breath sounds normal. No respiratory distress. She has no wheezes. She has no rales. She exhibits no tenderness.   Abdominal: Soft. Bowel sounds are normal. She exhibits no distension. There is no tenderness. There is no guarding.   Musculoskeletal: Normal range of motion. She exhibits no edema or tenderness.   Lymphadenopathy:     She has no cervical adenopathy.   Neurological: She is alert and oriented to person, place, and time. No cranial nerve deficit.   Skin: Skin is warm and dry.   Few purpuric spots, forearms   Psychiatric: She has a normal mood and affect. Her behavior is normal. Judgment and thought content normal.       Performance Status:  Asymptomatic    Results:  WBC   Date Value Ref Range Status   10/02/2018 5.92 4.80 - 10.80 10*3/mm3 Final     Hemoglobin   Date Value Ref Range Status   10/02/2018 10.4 (L) 12.0 - 16.0 g/dL " "Final     Hematocrit   Date Value Ref Range Status   10/02/2018 33.2 (L) 37.0 - 47.0 % Final     Platelets   Date Value Ref Range Status   10/02/2018 327 130 - 400 10*3/mm3 Final     Creatinine   Date Value Ref Range Status   10/02/2018 3.30 (H) 0.50 - 1.40 mg/dL Final     AST (SGOT)   Date Value Ref Range Status   10/02/2018 20 7 - 45 U/L Final        Interval History:  The patient is here for follow-up and for possible consideration for Procrit.  She has no new complaints at this time and has been doing well.  Apparently her   5 years ago, and she reminded me that I am seeing her in the past when I was still here.  She did not get any Procrit on her last visit    Interval Notes:  The following portions of the patient's history were reviewed and updated as appropriate: allergies, current medications, past family history, past medical history, past social history, past surgical history and problem list.     History of Present Illness   76-year-old white lady with chronic kidney disease has an anemia and has been on ESAs since . She has been tolerating it well and her anemia has been stable with a supplement.    Review of Systems   Constitutional: Negative.    HENT: Negative.    Eyes: Negative.    Respiratory: Negative.    Cardiovascular: Negative.    Gastrointestinal: Negative.    Endocrine: Negative.    Genitourinary: Negative.    Musculoskeletal: Negative.    Allergic/Immunologic: Negative.    Neurological: Negative.    Hematological: Negative.    Psychiatric/Behavioral: Negative.         Physical Exam:  Vital Signs for this encounter:  BSA: 1.57 meters squared  /86   Pulse 76   Temp 97.4 °F (36.3 °C) (Tympanic)   Resp 16   Ht 157.5 cm (62.01\")   Wt 57.2 kg (126 lb)   SpO2 99%   BMI 23.04 kg/m²     Physical Exam   Constitutional: She is oriented to person, place, and time. She appears well-developed and well-nourished. No distress.   HENT:   Head: Normocephalic and atraumatic.   Nose: " Nose normal.   Mouth/Throat: Oropharynx is clear and moist. No oropharyngeal exudate.   Eyes: Pupils are equal, round, and reactive to light. Conjunctivae and EOM are normal. No scleral icterus.   Neck: Normal range of motion. Neck supple. No JVD present. No thyromegaly present.   Cardiovascular: Normal rate, regular rhythm and normal heart sounds.  Exam reveals no gallop and no friction rub.    Pulmonary/Chest: Effort normal and breath sounds normal. No respiratory distress. She has no wheezes. She has no rales. She exhibits no tenderness.   Abdominal: Soft. Bowel sounds are normal. She exhibits no distension. There is no tenderness. There is no guarding.   Musculoskeletal: Normal range of motion. She exhibits no edema or tenderness.   Lymphadenopathy:     She has no cervical adenopathy.   Neurological: She is alert and oriented to person, place, and time. No cranial nerve deficit.   Skin: Skin is warm and dry.   Few purpuric spots, forearms   Psychiatric: She has a normal mood and affect. Her behavior is normal. Judgment and thought content normal.       Performance Status:  Asymptomatic    Results:  WBC   Date Value Ref Range Status   10/02/2018 5.92 4.80 - 10.80 10*3/mm3 Final     Hemoglobin   Date Value Ref Range Status   10/02/2018 10.4 (L) 12.0 - 16.0 g/dL Final     Hematocrit   Date Value Ref Range Status   10/02/2018 33.2 (L) 37.0 - 47.0 % Final     Platelets   Date Value Ref Range Status   10/02/2018 327 130 - 400 10*3/mm3 Final     Creatinine   Date Value Ref Range Status   10/02/2018 3.30 (H) 0.50 - 1.40 mg/dL Final     AST (SGOT)   Date Value Ref Range Status   10/02/2018 20 7 - 45 U/L Final     Anemia of chronic kidney disease.  Hemoglobin today is 10.4gm%,  continue Procrit.  Patient clinically asymptomatic.  Over the last 3 months for MCV of, therefore I am would check vitamin B12 and folic acid on today's draw and I have asked her to start taking oral folic acid 400 µg daily as well as start  receiving vitamin B12 1000 µg subcutaneous every 4 weekly.  If the MCV remains high in another 3-4 months I may consider doing a bone marrow biopsy to diagnose myelodysplasia.  The last colonoscopy was done within the last 5 years and a verbal report given to me by the patient it was negative.    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 I am not sure if ACTHER has help improve her Hgb 11.1gm%today.  Any way per CMS guidelines, if the Hg 7.4 therefore Procrit today. Will draw Iron studies today. Clinically doing well, says she worked in the yard over the weekend. ACTH is stopped by Nephrology Svx. Recheck CBC Wed, if Iron is low will infuse Iron, however, if Hgb less then 8gm, will xfuse PRBC ( apparently she bled from Heammorhoids ).    Repeat Hgb today 7.6, however, pt clinically asymptomatic, therefore, will OBSERVE and recheck CBC again on Monday. Meanwhile check stools for Ob.  Toady Hgb still low at 7.4gm%, however pt is doing fine clinically asymptomatic. I believe she is dilutional from Volume overload 3rd space fluid from ACHTAR that was Rx and stopped last month.  Since she is asymptomatic, will OBSERVE and redo her CBC next week. However, I have advised her that my cuit off for transfusion is Hgb 7gm%.    1/3 stool cards +OB. Hgb today 7.2 however, pt is completely asymptomatic and able to preform ADLs. Will increase dose Procrit to 76171 units due May 29th.  Meanwhile check Hgb wkly.    Creatinine increased to 3.09, with worsening renal failure, there is decreasing Hgb. If the Hgb is less then 8gm% next week, then the plan is to increase the Procrit to 60,000 Units every month STARTING TODAY.    She was inhouse 5 days ago with electrolyte imbalance from fluid overload, causing dilutional Hyponatremia and dilutional anemia. Apparently she received IV diuretics and was D/cd on fluid  restriction. In house she also received 1 U PRBC. Therefore, today her Hgb is above 9.4. I am therefore, contemplating to bring back the Procrit dose to 40,000 units.     Hgb stable 10.4: Stable. Cont Procrit. q monthly 40,000 units. he is on Tacrolimus for reasons not clear to me. Will check with Nephrology.

## 2018-10-31 ENCOUNTER — OFFICE VISIT (OUTPATIENT)
Dept: ONCOLOGY | Facility: CLINIC | Age: 78
End: 2018-10-31

## 2018-10-31 ENCOUNTER — INFUSION (OUTPATIENT)
Dept: ONCOLOGY | Facility: HOSPITAL | Age: 78
End: 2018-10-31
Attending: INTERNAL MEDICINE

## 2018-10-31 ENCOUNTER — LAB (OUTPATIENT)
Dept: LAB | Facility: HOSPITAL | Age: 78
End: 2018-10-31
Attending: INTERNAL MEDICINE

## 2018-10-31 VITALS
DIASTOLIC BLOOD PRESSURE: 74 MMHG | OXYGEN SATURATION: 96 % | HEIGHT: 62 IN | SYSTOLIC BLOOD PRESSURE: 150 MMHG | WEIGHT: 130.6 LBS | BODY MASS INDEX: 24.03 KG/M2 | RESPIRATION RATE: 18 BRPM | HEART RATE: 94 BPM | TEMPERATURE: 97.1 F

## 2018-10-31 DIAGNOSIS — N18.30 ANEMIA OF CHRONIC RENAL FAILURE, STAGE 3 (MODERATE) (HCC): ICD-10-CM

## 2018-10-31 DIAGNOSIS — N18.30 ANEMIA OF CHRONIC KIDNEY FAILURE, STAGE 3 (MODERATE) (HCC): ICD-10-CM

## 2018-10-31 DIAGNOSIS — D63.1 ANEMIA OF CHRONIC RENAL FAILURE, STAGE 3 (MODERATE) (HCC): ICD-10-CM

## 2018-10-31 DIAGNOSIS — D63.1 ANEMIA OF CHRONIC KIDNEY FAILURE, STAGE 3 (MODERATE) (HCC): ICD-10-CM

## 2018-10-31 LAB
ALBUMIN SERPL-MCNC: 4.1 G/DL (ref 3.5–5)
ALBUMIN/GLOB SERPL: 1.2 G/DL (ref 1.1–2.5)
ALP SERPL-CCNC: 122 U/L (ref 24–120)
ALT SERPL W P-5'-P-CCNC: <15 U/L (ref 0–54)
ANION GAP SERPL CALCULATED.3IONS-SCNC: 15 MMOL/L (ref 4–13)
AST SERPL-CCNC: 25 U/L (ref 7–45)
BASOPHILS # BLD AUTO: 0.05 10*3/MM3 (ref 0–0.2)
BASOPHILS NFR BLD AUTO: 0.6 % (ref 0–2)
BILIRUB SERPL-MCNC: 0.3 MG/DL (ref 0.1–1)
BUN BLD-MCNC: 48 MG/DL (ref 5–21)
BUN/CREAT SERPL: 15.1 (ref 7–25)
CALCIUM SPEC-SCNC: 9.4 MG/DL (ref 8.4–10.4)
CHLORIDE SERPL-SCNC: 102 MMOL/L (ref 98–110)
CO2 SERPL-SCNC: 19 MMOL/L (ref 24–31)
CREAT BLD-MCNC: 3.18 MG/DL (ref 0.5–1.4)
DEPRECATED RDW RBC AUTO: 48.5 FL (ref 40–54)
EOSINOPHIL # BLD AUTO: 0.03 10*3/MM3 (ref 0–0.7)
EOSINOPHIL NFR BLD AUTO: 0.4 % (ref 0–4)
ERYTHROCYTE [DISTWIDTH] IN BLOOD BY AUTOMATED COUNT: 14.3 % (ref 12–15)
GFR SERPL CREATININE-BSD FRML MDRD: 14 ML/MIN/1.73
GFR SERPL CREATININE-BSD FRML MDRD: ABNORMAL ML/MIN/1.73
GLOBULIN UR ELPH-MCNC: 3.3 GM/DL
GLUCOSE BLD-MCNC: 84 MG/DL (ref 70–100)
HCT VFR BLD AUTO: 36 % (ref 37–47)
HGB BLD-MCNC: 11.5 G/DL (ref 12–16)
HOLD SPECIMEN: NORMAL
HOLD SPECIMEN: NORMAL
IMM GRANULOCYTES # BLD: 0.02 10*3/MM3 (ref 0–0.03)
IMM GRANULOCYTES NFR BLD: 0.2 % (ref 0–5)
LYMPHOCYTES # BLD AUTO: 2.81 10*3/MM3 (ref 0.72–4.86)
LYMPHOCYTES NFR BLD AUTO: 34.9 % (ref 15–45)
MCH RBC QN AUTO: 30 PG (ref 28–32)
MCHC RBC AUTO-ENTMCNC: 31.9 G/DL (ref 33–36)
MCV RBC AUTO: 94 FL (ref 82–98)
MONOCYTES # BLD AUTO: 0.86 10*3/MM3 (ref 0.19–1.3)
MONOCYTES NFR BLD AUTO: 10.7 % (ref 4–12)
NEUTROPHILS # BLD AUTO: 4.28 10*3/MM3 (ref 1.87–8.4)
NEUTROPHILS NFR BLD AUTO: 53.2 % (ref 39–78)
NRBC BLD MANUAL-RTO: 0 /100 WBC (ref 0–0)
PLATELET # BLD AUTO: 293 10*3/MM3 (ref 130–400)
PMV BLD AUTO: 10.2 FL (ref 6–12)
POTASSIUM BLD-SCNC: 4.5 MMOL/L (ref 3.5–5.3)
PROT SERPL-MCNC: 7.4 G/DL (ref 6.3–8.7)
RBC # BLD AUTO: 3.83 10*6/MM3 (ref 4.2–5.4)
SODIUM BLD-SCNC: 136 MMOL/L (ref 135–145)
WBC NRBC COR # BLD: 8.05 10*3/MM3 (ref 4.8–10.8)

## 2018-10-31 PROCEDURE — 80053 COMPREHEN METABOLIC PANEL: CPT | Performed by: INTERNAL MEDICINE

## 2018-10-31 PROCEDURE — 99214 OFFICE O/P EST MOD 30 MIN: CPT | Performed by: INTERNAL MEDICINE

## 2018-10-31 PROCEDURE — 85025 COMPLETE CBC W/AUTO DIFF WBC: CPT | Performed by: INTERNAL MEDICINE

## 2018-10-31 PROCEDURE — 36415 COLL VENOUS BLD VENIPUNCTURE: CPT

## 2018-10-31 RX ORDER — CYANOCOBALAMIN 1000 UG/ML
1000 INJECTION, SOLUTION INTRAMUSCULAR; SUBCUTANEOUS ONCE
Status: CANCELLED
Start: 2018-12-26 | End: 2018-11-28

## 2018-10-31 NOTE — PROGRESS NOTES
Patient ID:   Susanne Valentine is a 78 y.o. female.  Referring Physician:   García Wood MD  [address] [facility] [address]  [place] [address]  Primary Care Provider:  Kaden Grande MD    Assessment/Plan:  Assessment   Patient Active Problem List   Diagnosis   • Anemia of chronic kidney failure   • Anemia of chronic renal failure, stage 3 (moderate) (CMS/HCC)   • B12 deficiency     Cancer Staging Information:  No matching staging information was found for the patient.    Susanne was seen today for outpatient infusion.    Diagnoses and all orders for this visit:    Anemia of chronic renal failure, stage 3 (moderate) (CMS/HCC)  -     Clinic Appointment Request  -     CBC and Differential; Future  -     Lab Appointment Request; Future  -     STAT Comprehensive Metabolic Panel; Future  -     Clinic Appointment Request; Future  -     ONCBCN INFUSION APPOINTMENT REQUEST 01; Future    Anemia of chronic kidney failure, stage 3 (moderate) (CMS/HCC)  -     Clinic Appointment Request  -     CBC and Differential; Future  -     Lab Appointment Request; Future  -     STAT Comprehensive Metabolic Panel; Future  -     Clinic Appointment Request; Future  -     ONCBCN INFUSION APPOINTMENT REQUEST 01; Future    Other orders  -     epoetin cleve (EPOGEN,PROCRIT) injection 40,000 Units; Inject 4 mL under the skin into the appropriate area as directed 1 (One) Time.  -     cyanocobalamin injection 1,000 mcg; Inject 1 mL under the skin into the appropriate area as directed 1 (One) Time.      The patient has a stable anemia from her chronic kidney disease, with the use of Procrit once a month as necessary.  We will hold the treatment today, and have her come back in 1 month with a repeat CBC, we will proceed with Procrit at 40,000 units subcutaneously if her hemoglobin is less than 10 g/DL.  Patient ID:   Susanne Valentine is a 78 y.o. female.  Referring Physician:   García Wood MD  [address] [facility] [address]  [place] [address]  Primary Care Provider:  Kaden Grande MD    Assessment/Plan:  Assessment   Patient Active Problem List   Diagnosis   • Anemia of chronic kidney failure   • Anemia of chronic renal failure, stage 3 (moderate) (CMS/HCC)   • B12 deficiency     Cancer Staging Information:  No matching staging information was found for the patient.    Susanne was seen today for outpatient infusion.    Diagnoses and all orders for this visit:    Anemia of chronic renal failure, stage 3 (moderate) (CMS/HCC)  -     Clinic Appointment Request  -     CBC and Differential; Future  -     Lab Appointment Request; Future  -     STAT Comprehensive Metabolic Panel; Future  -     Clinic Appointment Request; Future  -     ONCBCN INFUSION APPOINTMENT REQUEST ; Future    Anemia of chronic kidney failure, stage 3 (moderate) (CMS/HCC)  -     Clinic Appointment Request  -     CBC and Differential; Future  -     Lab Appointment Request; Future  -     STAT Comprehensive Metabolic Panel; Future  -     Clinic Appointment Request; Future  -     ONCBCN INFUSION APPOINTMENT REQUEST ; Future    Other orders  -     epoetin cleve (EPOGEN,PROCRIT) injection 40,000 Units; Inject 4 mL under the skin into the appropriate area as directed 1 (One) Time.  -     cyanocobalamin injection 1,000 mcg; Inject 1 mL under the skin into the appropriate area as directed 1 (One) Time.      The patient has a stable anemia from her chronic kidney disease, with the use of Procrit once a month as necessary.  We will hold the treatment today, and have her come back in 1 month with a repeat CBC, we will proceed with Procrit at 40,000 units subcutaneously if her hemoglobin is less than 10 g/DL.     Interval History:  The patient is here for follow-up and for possible consideration for Procrit.  She has no new complaints at this time and has been doing well.  Apparently her   5 years ago, and she reminded me that I am seeing her in the past when I was  "still here.  She did not get any Procrit on her last visit    Interval Notes:  The following portions of the patient's history were reviewed and updated as appropriate: allergies, current medications, past family history, past medical history, past social history, past surgical history and problem list.     History of Present Illness   76-year-old white lady with chronic kidney disease has an anemia and has been on ESAs since 2008. She has been tolerating it well and her anemia has been stable with a supplement.    Review of Systems   Constitutional: Negative.    HENT: Negative.    Eyes: Negative.    Respiratory: Negative.    Cardiovascular: Negative.    Gastrointestinal: Negative.    Endocrine: Negative.    Genitourinary: Negative.    Musculoskeletal: Negative.    Allergic/Immunologic: Negative.    Neurological: Negative.    Hematological: Negative.    Psychiatric/Behavioral: Negative.         Physical Exam:  Vital Signs for this encounter:  BSA: 1.59 meters squared  /74   Pulse 94   Temp 97.1 °F (36.2 °C) (Tympanic)   Resp 18   Ht 157.5 cm (62.01\")   Wt 59.2 kg (130 lb 9.6 oz)   SpO2 96%   BMI 23.88 kg/m²     Physical Exam   Constitutional: She is oriented to person, place, and time. She appears well-developed and well-nourished. No distress.   HENT:   Head: Normocephalic and atraumatic.   Nose: Nose normal.   Mouth/Throat: Oropharynx is clear and moist. No oropharyngeal exudate.   Eyes: Conjunctivae and EOM are normal. Pupils are equal, round, and reactive to light. No scleral icterus.   Neck: Normal range of motion. Neck supple. No JVD present. No thyromegaly present.   Cardiovascular: Normal rate, regular rhythm and normal heart sounds.  Exam reveals no gallop and no friction rub.    Pulmonary/Chest: Effort normal and breath sounds normal. No respiratory distress. She has no wheezes. She has no rales. She exhibits no tenderness.   Abdominal: Soft. Bowel sounds are normal. She exhibits no " distension. There is no tenderness. There is no guarding.   Musculoskeletal: Normal range of motion. She exhibits no edema or tenderness.   Lymphadenopathy:     She has no cervical adenopathy.   Neurological: She is alert and oriented to person, place, and time. No cranial nerve deficit.   Skin: Skin is warm and dry.   Few purpuric spots, forearms   Psychiatric: She has a normal mood and affect. Her behavior is normal. Judgment and thought content normal.       Performance Status:  Asymptomatic    Results:  WBC   Date Value Ref Range Status   10/31/2018 8.05 4.80 - 10.80 10*3/mm3 Final     Hemoglobin   Date Value Ref Range Status   10/31/2018 11.5 (L) 12.0 - 16.0 g/dL Final     Hematocrit   Date Value Ref Range Status   10/31/2018 36.0 (L) 37.0 - 47.0 % Final     Platelets   Date Value Ref Range Status   10/31/2018 293 130 - 400 10*3/mm3 Final     Creatinine   Date Value Ref Range Status   10/31/2018 3.18 (H) 0.50 - 1.40 mg/dL Final     AST (SGOT)   Date Value Ref Range Status   10/31/2018 25 7 - 45 U/L Final        Interval History:  The patient is here for follow-up and for possible consideration for Procrit.  She has no new complaints at this time and has been doing well.  Apparently her   5 years ago, and she reminded me that I am seeing her in the past when I was still here.  She did not get any Procrit on her last visit    Interval Notes:  The following portions of the patient's history were reviewed and updated as appropriate: allergies, current medications, past family history, past medical history, past social history, past surgical history and problem list.     History of Present Illness   76-year-old white lady with chronic kidney disease has an anemia and has been on ESAs since 2008. She has been tolerating it well and her anemia has been stable with a supplement.    Review of Systems   Constitutional: Negative.    HENT: Negative.    Eyes: Negative.    Respiratory: Negative.    Cardiovascular:  "Negative.    Gastrointestinal: Negative.    Endocrine: Negative.    Genitourinary: Negative.    Musculoskeletal: Negative.    Allergic/Immunologic: Negative.    Neurological: Negative.    Hematological: Negative.    Psychiatric/Behavioral: Negative.         Physical Exam:  Vital Signs for this encounter:  BSA: 1.59 meters squared  /74   Pulse 94   Temp 97.1 °F (36.2 °C) (Tympanic)   Resp 18   Ht 157.5 cm (62.01\")   Wt 59.2 kg (130 lb 9.6 oz)   SpO2 96%   BMI 23.88 kg/m²     Physical Exam   Constitutional: She is oriented to person, place, and time. She appears well-developed and well-nourished. No distress.   HENT:   Head: Normocephalic and atraumatic.   Nose: Nose normal.   Mouth/Throat: Oropharynx is clear and moist. No oropharyngeal exudate.   Eyes: Pupils are equal, round, and reactive to light. Conjunctivae and EOM are normal. No scleral icterus.   Neck: Normal range of motion. Neck supple. No JVD present. No thyromegaly present.   Cardiovascular: Normal rate, regular rhythm and normal heart sounds.  Exam reveals no gallop and no friction rub.    Pulmonary/Chest: Effort normal and breath sounds normal. No respiratory distress. She has no wheezes. She has no rales. She exhibits no tenderness.   Abdominal: Soft. Bowel sounds are normal. She exhibits no distension. There is no tenderness. There is no guarding.   Musculoskeletal: Normal range of motion. She exhibits no edema or tenderness.   Lymphadenopathy:     She has no cervical adenopathy.   Neurological: She is alert and oriented to person, place, and time. No cranial nerve deficit.   Skin: Skin is warm and dry.   Few purpuric spots, forearms   Psychiatric: She has a normal mood and affect. Her behavior is normal. Judgment and thought content normal.       Performance Status:  Asymptomatic    Results:  WBC   Date Value Ref Range Status   10/31/2018 8.05 4.80 - 10.80 10*3/mm3 Final     Hemoglobin   Date Value Ref Range Status   10/31/2018 11.5 (L) " 12.0 - 16.0 g/dL Final     Hematocrit   Date Value Ref Range Status   10/31/2018 36.0 (L) 37.0 - 47.0 % Final     Platelets   Date Value Ref Range Status   10/31/2018 293 130 - 400 10*3/mm3 Final     Creatinine   Date Value Ref Range Status   10/31/2018 3.18 (H) 0.50 - 1.40 mg/dL Final     AST (SGOT)   Date Value Ref Range Status   10/31/2018 25 7 - 45 U/L Final     Anemia of chronic kidney disease.  Hemoglobin today is 10.4gm%,  continue Procrit.  Patient clinically asymptomatic.  Over the last 3 months for MCV of, therefore I am would check vitamin B12 and folic acid on today's draw and I have asked her to start taking oral folic acid 400 µg daily as well as start receiving vitamin B12 1000 µg subcutaneous every 4 weekly.  If the MCV remains high in another 3-4 months I may consider doing a bone marrow biopsy to diagnose myelodysplasia.  The last colonoscopy was done within the last 5 years and a verbal report given to me by the patient it was negative.    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 I am not sure if ACTHER has help improve her Hgb 11.1gm%today.  Any way per CMS guidelines, if the Hg 7.4 therefore Procrit today. Will draw Iron studies today. Clinically doing well, says she worked in the yard over the weekend. ACTH is stopped by Nephrology Svx. Recheck CBC Wed, if Iron is low will infuse Iron, however, if Hgb less then 8gm, will xfuse PRBC ( apparently she bled from Heammorhoids ).    Repeat Hgb today 7.6, however, pt clinically asymptomatic, therefore, will OBSERVE and recheck CBC again on Monday. Meanwhile check stools for Ob.  Toady Hgb still low at 7.4gm%, however pt is doing fine clinically asymptomatic. I believe she is dilutional from Volume overload 3rd space fluid from ACHTAR that was Rx and stopped last month.  Since she is asymptomatic, will OBSERVE and redo her CBC next  week. However, I have advised her that my cuit off for transfusion is Hgb 7gm%.    1/3 stool cards +OB. Hgb today 7.2 however, pt is completely asymptomatic and able to preform ADLs. Will increase dose Procrit to 62190 units due May 29th.  Meanwhile check Hgb wkly.    Creatinine increased to 3.09, with worsening renal failure, there is decreasing Hgb. If the Hgb is less then 8gm% next week, then the plan is to increase the Procrit to 60,000 Units every month STARTING TODAY.    She was inhouse 5 days ago with electrolyte imbalance from fluid overload, causing dilutional Hyponatremia and dilutional anemia. Apparently she received IV diuretics and was D/cd on fluid restriction. In house she also received 1 U PRBC. Therefore, today her Hgb is above 9.4. I am therefore, contemplating to bring back the Procrit dose to 40,000 units.     Hgb stable 11.5: Stable. No Procrit today.. he is on Tacrolimus for reasons not clear to me. Will check with Nephrology.

## 2018-11-21 DIAGNOSIS — N18.30 ANEMIA OF CHRONIC RENAL FAILURE, STAGE 3 (MODERATE) (HCC): ICD-10-CM

## 2018-11-21 DIAGNOSIS — D63.1 ANEMIA OF CHRONIC RENAL FAILURE, STAGE 3 (MODERATE) (HCC): ICD-10-CM

## 2018-11-21 DIAGNOSIS — N18.30 ANEMIA OF CHRONIC KIDNEY FAILURE, STAGE 3 (MODERATE) (HCC): ICD-10-CM

## 2018-11-21 DIAGNOSIS — D63.1 ANEMIA OF CHRONIC KIDNEY FAILURE, STAGE 3 (MODERATE) (HCC): ICD-10-CM

## 2018-11-28 ENCOUNTER — INFUSION (OUTPATIENT)
Dept: ONCOLOGY | Facility: HOSPITAL | Age: 78
End: 2018-11-28
Attending: INTERNAL MEDICINE

## 2018-11-28 ENCOUNTER — OFFICE VISIT (OUTPATIENT)
Dept: ONCOLOGY | Facility: CLINIC | Age: 78
End: 2018-11-28

## 2018-11-28 ENCOUNTER — LAB (OUTPATIENT)
Dept: LAB | Facility: HOSPITAL | Age: 78
End: 2018-11-28
Attending: INTERNAL MEDICINE

## 2018-11-28 VITALS
DIASTOLIC BLOOD PRESSURE: 80 MMHG | RESPIRATION RATE: 18 BRPM | TEMPERATURE: 98.4 F | WEIGHT: 129.4 LBS | BODY MASS INDEX: 23.81 KG/M2 | HEIGHT: 62 IN | SYSTOLIC BLOOD PRESSURE: 128 MMHG

## 2018-11-28 VITALS
RESPIRATION RATE: 18 BRPM | SYSTOLIC BLOOD PRESSURE: 144 MMHG | OXYGEN SATURATION: 100 % | HEIGHT: 62 IN | HEART RATE: 66 BPM | DIASTOLIC BLOOD PRESSURE: 63 MMHG | BODY MASS INDEX: 24.11 KG/M2 | WEIGHT: 131 LBS | TEMPERATURE: 97.2 F

## 2018-11-28 DIAGNOSIS — D63.1 ANEMIA OF CHRONIC RENAL FAILURE, STAGE 3 (MODERATE) (HCC): ICD-10-CM

## 2018-11-28 DIAGNOSIS — N18.30 ANEMIA OF CHRONIC RENAL FAILURE, STAGE 3 (MODERATE) (HCC): Primary | ICD-10-CM

## 2018-11-28 DIAGNOSIS — N18.30 ANEMIA OF CHRONIC KIDNEY FAILURE, STAGE 3 (MODERATE) (HCC): ICD-10-CM

## 2018-11-28 DIAGNOSIS — N18.30 ANEMIA OF CHRONIC RENAL FAILURE, STAGE 3 (MODERATE) (HCC): ICD-10-CM

## 2018-11-28 DIAGNOSIS — D63.1 ANEMIA OF CHRONIC KIDNEY FAILURE, STAGE 3 (MODERATE) (HCC): ICD-10-CM

## 2018-11-28 DIAGNOSIS — D63.1 ANEMIA OF CHRONIC RENAL FAILURE, STAGE 3 (MODERATE) (HCC): Primary | ICD-10-CM

## 2018-11-28 LAB
ALBUMIN SERPL-MCNC: 3.7 G/DL (ref 3.5–5)
ALBUMIN/GLOB SERPL: 1.2 G/DL (ref 1.1–2.5)
ALP SERPL-CCNC: 99 U/L (ref 24–120)
ALT SERPL W P-5'-P-CCNC: 20 U/L (ref 0–54)
ANION GAP SERPL CALCULATED.3IONS-SCNC: 13 MMOL/L (ref 4–13)
AST SERPL-CCNC: 26 U/L (ref 7–45)
BASOPHILS # BLD AUTO: 0.03 10*3/MM3 (ref 0–0.2)
BASOPHILS NFR BLD AUTO: 0.3 % (ref 0–2)
BILIRUB SERPL-MCNC: 0.2 MG/DL (ref 0.1–1)
BUN BLD-MCNC: 54 MG/DL (ref 5–21)
BUN/CREAT SERPL: 17.3 (ref 7–25)
CALCIUM SPEC-SCNC: 8.8 MG/DL (ref 8.4–10.4)
CHLORIDE SERPL-SCNC: 104 MMOL/L (ref 98–110)
CO2 SERPL-SCNC: 19 MMOL/L (ref 24–31)
CREAT BLD-MCNC: 3.13 MG/DL (ref 0.5–1.4)
DEPRECATED RDW RBC AUTO: 49.3 FL (ref 40–54)
EOSINOPHIL # BLD AUTO: 0.01 10*3/MM3 (ref 0–0.7)
EOSINOPHIL NFR BLD AUTO: 0.1 % (ref 0–4)
ERYTHROCYTE [DISTWIDTH] IN BLOOD BY AUTOMATED COUNT: 14.6 % (ref 12–15)
GFR SERPL CREATININE-BSD FRML MDRD: 14 ML/MIN/1.73
GFR SERPL CREATININE-BSD FRML MDRD: ABNORMAL ML/MIN/1.73
GLOBULIN UR ELPH-MCNC: 3 GM/DL
GLUCOSE BLD-MCNC: 106 MG/DL (ref 70–100)
HCT VFR BLD AUTO: 31.2 % (ref 37–47)
HGB BLD-MCNC: 10.2 G/DL (ref 12–16)
HOLD SPECIMEN: NORMAL
HOLD SPECIMEN: NORMAL
IMM GRANULOCYTES # BLD: 0.06 10*3/MM3 (ref 0–0.03)
IMM GRANULOCYTES NFR BLD: 0.7 % (ref 0–5)
LYMPHOCYTES # BLD AUTO: 1.14 10*3/MM3 (ref 0.72–4.86)
LYMPHOCYTES NFR BLD AUTO: 12.7 % (ref 15–45)
MCH RBC QN AUTO: 30 PG (ref 28–32)
MCHC RBC AUTO-ENTMCNC: 32.7 G/DL (ref 33–36)
MCV RBC AUTO: 91.8 FL (ref 82–98)
MONOCYTES # BLD AUTO: 0.36 10*3/MM3 (ref 0.19–1.3)
MONOCYTES NFR BLD AUTO: 4 % (ref 4–12)
NEUTROPHILS # BLD AUTO: 7.38 10*3/MM3 (ref 1.87–8.4)
NEUTROPHILS NFR BLD AUTO: 82.2 % (ref 39–78)
NRBC BLD MANUAL-RTO: 0 /100 WBC (ref 0–0)
PLATELET # BLD AUTO: 269 10*3/MM3 (ref 130–400)
PMV BLD AUTO: 9.8 FL (ref 6–12)
POTASSIUM BLD-SCNC: 4.5 MMOL/L (ref 3.5–5.3)
PROT SERPL-MCNC: 6.7 G/DL (ref 6.3–8.7)
RBC # BLD AUTO: 3.4 10*6/MM3 (ref 4.2–5.4)
SODIUM BLD-SCNC: 136 MMOL/L (ref 135–145)
WBC NRBC COR # BLD: 8.98 10*3/MM3 (ref 4.8–10.8)

## 2018-11-28 PROCEDURE — 25010000002 EPOETIN ALFA PER 1000 UNITS: Performed by: INTERNAL MEDICINE

## 2018-11-28 PROCEDURE — 25010000002 CYANOCOBALAMIN PER 1000 MCG: Performed by: INTERNAL MEDICINE

## 2018-11-28 PROCEDURE — 96372 THER/PROPH/DIAG INJ SC/IM: CPT

## 2018-11-28 PROCEDURE — 36415 COLL VENOUS BLD VENIPUNCTURE: CPT | Performed by: INTERNAL MEDICINE

## 2018-11-28 PROCEDURE — 80053 COMPREHEN METABOLIC PANEL: CPT | Performed by: INTERNAL MEDICINE

## 2018-11-28 PROCEDURE — 99214 OFFICE O/P EST MOD 30 MIN: CPT | Performed by: INTERNAL MEDICINE

## 2018-11-28 PROCEDURE — 85025 COMPLETE CBC W/AUTO DIFF WBC: CPT | Performed by: INTERNAL MEDICINE

## 2018-11-28 RX ORDER — PREDNISONE 10 MG/1
5 TABLET ORAL DAILY
COMMUNITY

## 2018-11-28 RX ORDER — CYANOCOBALAMIN 1000 UG/ML
1000 INJECTION, SOLUTION INTRAMUSCULAR; SUBCUTANEOUS ONCE
Status: COMPLETED | OUTPATIENT
Start: 2018-11-28 | End: 2018-11-28

## 2018-11-28 RX ORDER — CYANOCOBALAMIN 1000 UG/ML
1000 INJECTION, SOLUTION INTRAMUSCULAR; SUBCUTANEOUS ONCE
Status: CANCELLED
Start: 2018-11-28 | End: 2018-11-28

## 2018-11-28 RX ADMIN — ERYTHROPOIETIN 40000 UNITS: 40000 INJECTION, SOLUTION INTRAVENOUS; SUBCUTANEOUS at 12:39

## 2018-11-28 RX ADMIN — CYANOCOBALAMIN 1000 MCG: 1000 INJECTION, SOLUTION INTRAMUSCULAR at 12:37

## 2018-11-28 NOTE — PROGRESS NOTES
Patient ID:   Susanne Valentine is a 78 y.o. female.  Referring Physician:   García Wood MD  [address] [place] [address] [address]  [place] [address]  Primary Care Provider:  Kaden Grande MD    Assessment/Plan:  Assessment   Patient Active Problem List   Diagnosis   • Anemia of chronic kidney failure   • Anemia of chronic renal failure, stage 3 (moderate) (CMS/HCC)   • B12 deficiency     Cancer Staging Information:  No matching staging information was found for the patient.    Susanne was seen today for anemia.    Diagnoses and all orders for this visit:    Anemia of chronic renal failure, stage 3 (moderate) (CMS/HCC)  -     Clinic Appointment Request    Anemia of chronic kidney failure, stage 3 (moderate) (CMS/HCC)  -     Clinic Appointment Request      The patient has a stable anemia from her chronic kidney disease, with the use of Procrit once a month as necessary.  We will hold the treatment today, and have her come back in 1 month with a repeat CBC, we will proceed with Procrit at 40,000 units subcutaneously if her hemoglobin is less than 10 g/DL.  Patient ID:   Susanne Valentine is a 78 y.o. female.  Referring Physician:   García Wood MD  [address] [place] [address] [address]  [place] [address]  Primary Care Provider:  Kaden Grande MD    Assessment/Plan:  Assessment   Patient Active Problem List   Diagnosis   • Anemia of chronic kidney failure   • Anemia of chronic renal failure, stage 3 (moderate) (CMS/HCC)   • B12 deficiency     Cancer Staging Information:  No matching staging information was found for the patient.    Susanne was seen today for anemia.    Diagnoses and all orders for this visit:    Anemia of chronic renal failure, stage 3 (moderate) (CMS/HCC)  -     Clinic Appointment Request    Anemia of chronic kidney failure, stage 3 (moderate) (CMS/HCC)  -     Clinic Appointment Request      The patient has a stable anemia from her chronic kidney disease, with the use of Procrit once a  "month as necessary.  We will hold the treatment today, and have her come back in 1 month with a repeat CBC, we will proceed with Procrit at 40,000 units subcutaneously if her hemoglobin is less than 10 g/DL.     Interval History:  The patient is here for follow-up and for possible consideration for Procrit.  She has no new complaints at this time and has been doing well.  Apparently her   5 years ago, and she reminded me that I am seeing her in the past when I was still here.  She did not get any Procrit on her last visit    Interval Notes:  The following portions of the patient's history were reviewed and updated as appropriate: allergies, current medications, past family history, past medical history, past social history, past surgical history and problem list.     History of Present Illness   76-year-old white lady with chronic kidney disease has an anemia and has been on ESAs since . She has been tolerating it well and her anemia has been stable with a supplement.    Review of Systems   Constitutional: Negative.    HENT: Negative.    Eyes: Negative.    Respiratory: Negative.    Cardiovascular: Negative.    Gastrointestinal: Negative.    Endocrine: Negative.    Genitourinary: Negative.    Musculoskeletal: Negative.    Allergic/Immunologic: Negative.    Neurological: Negative.    Hematological: Negative.    Psychiatric/Behavioral: Negative.         Physical Exam:  Vital Signs for this encounter:  BSA: 1.59 meters squared  /80   Temp 98.4 °F (36.9 °C) (Tympanic)   Resp 18   Ht 157.5 cm (62.01\")   Wt 58.7 kg (129 lb 6.4 oz)   BMI 23.66 kg/m²     Physical Exam   Constitutional: She is oriented to person, place, and time. She appears well-developed and well-nourished. No distress.   HENT:   Head: Normocephalic and atraumatic.   Nose: Nose normal.   Mouth/Throat: Oropharynx is clear and moist. No oropharyngeal exudate.   Eyes: Conjunctivae and EOM are normal. Pupils are equal, round, and " reactive to light. No scleral icterus.   Neck: Normal range of motion. Neck supple. No JVD present. No thyromegaly present.   Cardiovascular: Normal rate, regular rhythm and normal heart sounds.  Exam reveals no gallop and no friction rub.    Pulmonary/Chest: Effort normal and breath sounds normal. No respiratory distress. She has no wheezes. She has no rales. She exhibits no tenderness.   Abdominal: Soft. Bowel sounds are normal. She exhibits no distension. There is no tenderness. There is no guarding.   Musculoskeletal: Normal range of motion. She exhibits no edema or tenderness.   Lymphadenopathy:     She has no cervical adenopathy.   Neurological: She is alert and oriented to person, place, and time. No cranial nerve deficit.   Skin: Skin is warm and dry.   Few purpuric spots, forearms   Psychiatric: She has a normal mood and affect. Her behavior is normal. Judgment and thought content normal.       Performance Status:  Asymptomatic    Results:  WBC   Date Value Ref Range Status   2018 8.98 4.80 - 10.80 10*3/mm3 Final     Hemoglobin   Date Value Ref Range Status   2018 10.2 (L) 12.0 - 16.0 g/dL Final     Hematocrit   Date Value Ref Range Status   2018 31.2 (L) 37.0 - 47.0 % Final     Platelets   Date Value Ref Range Status   2018 269 130 - 400 10*3/mm3 Final     Creatinine   Date Value Ref Range Status   2018 3.13 (H) 0.50 - 1.40 mg/dL Final     AST (SGOT)   Date Value Ref Range Status   2018 26 7 - 45 U/L Final        Interval History:  The patient is here for follow-up and for possible consideration for Procrit.  She has no new complaints at this time and has been doing well.  Apparently her   5 years ago, and she reminded me that I am seeing her in the past when I was still here.  She did not get any Procrit on her last visit    Interval Notes:  The following portions of the patient's history were reviewed and updated as appropriate: allergies, current  "medications, past family history, past medical history, past social history, past surgical history and problem list.     History of Present Illness   76-year-old white lady with chronic kidney disease has an anemia and has been on ESAs since 2008. She has been tolerating it well and her anemia has been stable with a supplement.    Review of Systems   Constitutional: Negative.    HENT: Negative.    Eyes: Negative.    Respiratory: Negative.    Cardiovascular: Negative.    Gastrointestinal: Negative.    Endocrine: Negative.    Genitourinary: Negative.    Musculoskeletal: Negative.    Allergic/Immunologic: Negative.    Neurological: Negative.    Hematological: Negative.    Psychiatric/Behavioral: Negative.         Physical Exam:  Vital Signs for this encounter:  BSA: 1.59 meters squared  /80   Temp 98.4 °F (36.9 °C) (Tympanic)   Resp 18   Ht 157.5 cm (62.01\")   Wt 58.7 kg (129 lb 6.4 oz)   BMI 23.66 kg/m²     Physical Exam   Constitutional: She is oriented to person, place, and time. She appears well-developed and well-nourished. No distress.   HENT:   Head: Normocephalic and atraumatic.   Nose: Nose normal.   Mouth/Throat: Oropharynx is clear and moist. No oropharyngeal exudate.   Eyes: Conjunctivae and EOM are normal. Pupils are equal, round, and reactive to light. No scleral icterus.   Neck: Normal range of motion. Neck supple. No JVD present. No thyromegaly present.   Cardiovascular: Normal rate, regular rhythm and normal heart sounds. Exam reveals no gallop and no friction rub.   Pulmonary/Chest: Effort normal and breath sounds normal. No respiratory distress. She has no wheezes. She has no rales. She exhibits no tenderness.   Abdominal: Soft. Bowel sounds are normal. She exhibits no distension. There is no tenderness. There is no guarding.   Musculoskeletal: Normal range of motion. She exhibits no edema or tenderness.   Lymphadenopathy:     She has no cervical adenopathy.   Neurological: She is alert " and oriented to person, place, and time. No cranial nerve deficit.   Skin: Skin is warm and dry.   Few purpuric spots, forearms   Psychiatric: She has a normal mood and affect. Her behavior is normal. Judgment and thought content normal.       Performance Status:  Asymptomatic    Results:  WBC   Date Value Ref Range Status   11/28/2018 8.98 4.80 - 10.80 10*3/mm3 Final     Hemoglobin   Date Value Ref Range Status   11/28/2018 10.2 (L) 12.0 - 16.0 g/dL Final     Hematocrit   Date Value Ref Range Status   11/28/2018 31.2 (L) 37.0 - 47.0 % Final     Platelets   Date Value Ref Range Status   11/28/2018 269 130 - 400 10*3/mm3 Final     Creatinine   Date Value Ref Range Status   11/28/2018 3.13 (H) 0.50 - 1.40 mg/dL Final     AST (SGOT)   Date Value Ref Range Status   11/28/2018 26 7 - 45 U/L Final     Anemia of chronic kidney disease.  Hemoglobin today is 10.4gm%,  continue Procrit.  Patient clinically asymptomatic.  Over the last 3 months for MCV of, therefore I am would check vitamin B12 and folic acid on today's draw and I have asked her to start taking oral folic acid 400 µg daily as well as start receiving vitamin B12 1000 µg subcutaneous every 4 weekly.  If the MCV remains high in another 3-4 months I may consider doing a bone marrow biopsy to diagnose myelodysplasia.  The last colonoscopy was done within the last 5 years and a verbal report given to me by the patient it was negative.    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 I am not sure if ACTHER has help improve her Hgb 11.1gm%today.  Any way per CMS guidelines, if the Hg 7.4 therefore Procrit today. Will draw Iron studies today. Clinically doing well, says she worked in the yard over the weekend. ACTH is stopped by Nephrology Svx. Recheck CBC Wed, if Iron is low will infuse Iron, however, if Hgb less then 8gm, will xfuse PRBC ( apparently she  bled from Heammorhoids ).    Repeat Hgb today 7.6, however, pt clinically asymptomatic, therefore, will OBSERVE and recheck CBC again on Monday. Meanwhile check stools for Ob.  Toady Hgb still low at 7.4gm%, however pt is doing fine clinically asymptomatic. I believe she is dilutional from Volume overload 3rd space fluid from ACHTAR that was Rx and stopped last month.  Since she is asymptomatic, will OBSERVE and redo her CBC next week. However, I have advised her that my cuit off for transfusion is Hgb 7gm%.    1/3 stool cards +OB. Hgb today 7.2 however, pt is completely asymptomatic and able to preform ADLs. Will increase dose Procrit to 53640 units due May 29th.  Meanwhile check Hgb wkly.    Creatinine increased to 3.09, with worsening renal failure, there is decreasing Hgb. If the Hgb is less then 8gm% next week, then the plan is to increase the Procrit to 60,000 Units every month STARTING TODAY.    She was inhouse 5 days ago with electrolyte imbalance from fluid overload, causing dilutional Hyponatremia and dilutional anemia. Apparently she received IV diuretics and was D/cd on fluid restriction. In house she also received 1 U PRBC. Therefore, today her Hgb is above 9.4. I am therefore, contemplating to bring back the Procrit dose to 40,000 units.     She is OFF of the tacrolimus x 3 days and now on Steroids, Prednisone 10mg daily. Hgb is 10.2 today, Rx Procrit.

## 2018-12-20 DIAGNOSIS — N18.30 ANEMIA OF CHRONIC RENAL FAILURE, STAGE 3 (MODERATE) (HCC): Primary | ICD-10-CM

## 2018-12-20 DIAGNOSIS — D63.1 ANEMIA OF CHRONIC RENAL FAILURE, STAGE 3 (MODERATE) (HCC): Primary | ICD-10-CM

## 2018-12-26 ENCOUNTER — APPOINTMENT (OUTPATIENT)
Dept: LAB | Facility: HOSPITAL | Age: 78
End: 2018-12-26

## 2018-12-26 ENCOUNTER — LAB (OUTPATIENT)
Dept: LAB | Facility: HOSPITAL | Age: 78
End: 2018-12-26
Attending: INTERNAL MEDICINE

## 2018-12-26 ENCOUNTER — OFFICE VISIT (OUTPATIENT)
Dept: ONCOLOGY | Facility: CLINIC | Age: 78
End: 2018-12-26

## 2018-12-26 ENCOUNTER — INFUSION (OUTPATIENT)
Dept: ONCOLOGY | Facility: HOSPITAL | Age: 78
End: 2018-12-26
Attending: INTERNAL MEDICINE

## 2018-12-26 ENCOUNTER — TRANSCRIBE ORDERS (OUTPATIENT)
Dept: ADMINISTRATIVE | Facility: HOSPITAL | Age: 78
End: 2018-12-26

## 2018-12-26 VITALS
BODY MASS INDEX: 25.38 KG/M2 | HEART RATE: 92 BPM | SYSTOLIC BLOOD PRESSURE: 156 MMHG | DIASTOLIC BLOOD PRESSURE: 86 MMHG | OXYGEN SATURATION: 97 % | TEMPERATURE: 96.9 F | WEIGHT: 137.9 LBS | RESPIRATION RATE: 16 BRPM | HEIGHT: 62 IN

## 2018-12-26 VITALS
SYSTOLIC BLOOD PRESSURE: 154 MMHG | DIASTOLIC BLOOD PRESSURE: 74 MMHG | OXYGEN SATURATION: 100 % | WEIGHT: 138 LBS | HEART RATE: 88 BPM | BODY MASS INDEX: 25.4 KG/M2 | HEIGHT: 62 IN | TEMPERATURE: 97.5 F

## 2018-12-26 DIAGNOSIS — N18.4 CHRONIC KIDNEY DISEASE, STAGE IV (SEVERE) (HCC): Primary | ICD-10-CM

## 2018-12-26 DIAGNOSIS — D63.1 ANEMIA OF CHRONIC KIDNEY FAILURE, STAGE 3 (MODERATE) (HCC): ICD-10-CM

## 2018-12-26 DIAGNOSIS — D63.1 ANEMIA OF CHRONIC RENAL FAILURE, STAGE 3 (MODERATE) (HCC): ICD-10-CM

## 2018-12-26 DIAGNOSIS — N18.30 ANEMIA OF CHRONIC KIDNEY FAILURE, STAGE 3 (MODERATE) (HCC): ICD-10-CM

## 2018-12-26 DIAGNOSIS — D63.1 ANEMIA OF CHRONIC RENAL FAILURE, STAGE 3 (MODERATE) (HCC): Primary | ICD-10-CM

## 2018-12-26 DIAGNOSIS — N18.30 ANEMIA OF CHRONIC RENAL FAILURE, STAGE 3 (MODERATE) (HCC): ICD-10-CM

## 2018-12-26 DIAGNOSIS — N18.4 CHRONIC KIDNEY DISEASE, STAGE IV (SEVERE) (HCC): ICD-10-CM

## 2018-12-26 DIAGNOSIS — N18.30 ANEMIA OF CHRONIC RENAL FAILURE, STAGE 3 (MODERATE) (HCC): Primary | ICD-10-CM

## 2018-12-26 LAB
ALBUMIN SERPL-MCNC: 3.6 G/DL (ref 3.5–5)
ALBUMIN/GLOB SERPL: 1.3 G/DL (ref 1.1–2.5)
ALP SERPL-CCNC: 82 U/L (ref 24–120)
ALT SERPL W P-5'-P-CCNC: 20 U/L (ref 0–54)
ANION GAP SERPL CALCULATED.3IONS-SCNC: 13 MMOL/L (ref 4–13)
AST SERPL-CCNC: 25 U/L (ref 7–45)
BASOPHILS # BLD AUTO: 0.03 10*3/MM3 (ref 0–0.2)
BASOPHILS NFR BLD AUTO: 0.3 % (ref 0–2)
BILIRUB SERPL-MCNC: 0.3 MG/DL (ref 0.1–1)
BUN BLD-MCNC: 52 MG/DL (ref 5–21)
BUN/CREAT SERPL: 16.5 (ref 7–25)
CALCIUM SPEC-SCNC: 8.1 MG/DL (ref 8.4–10.4)
CHLORIDE SERPL-SCNC: 102 MMOL/L (ref 98–110)
CO2 SERPL-SCNC: 19 MMOL/L (ref 24–31)
CREAT BLD-MCNC: 3.15 MG/DL (ref 0.5–1.4)
DEPRECATED RDW RBC AUTO: 52.9 FL (ref 40–54)
EOSINOPHIL # BLD AUTO: 0.02 10*3/MM3 (ref 0–0.7)
EOSINOPHIL NFR BLD AUTO: 0.2 % (ref 0–4)
ERYTHROCYTE [DISTWIDTH] IN BLOOD BY AUTOMATED COUNT: 15.7 % (ref 12–15)
GFR SERPL CREATININE-BSD FRML MDRD: 14 ML/MIN/1.73
GFR SERPL CREATININE-BSD FRML MDRD: ABNORMAL ML/MIN/1.73
GLOBULIN UR ELPH-MCNC: 2.8 GM/DL
GLUCOSE BLD-MCNC: 95 MG/DL (ref 70–100)
HCT VFR BLD AUTO: 33.1 % (ref 37–47)
HGB BLD-MCNC: 10.4 G/DL (ref 12–16)
IMM GRANULOCYTES # BLD AUTO: 0.08 10*3/MM3 (ref 0–0.03)
IMM GRANULOCYTES NFR BLD AUTO: 0.8 % (ref 0–5)
LYMPHOCYTES # BLD AUTO: 0.81 10*3/MM3 (ref 0.72–4.86)
LYMPHOCYTES NFR BLD AUTO: 8.6 % (ref 15–45)
MCH RBC QN AUTO: 29.4 PG (ref 28–32)
MCHC RBC AUTO-ENTMCNC: 31.4 G/DL (ref 33–36)
MCV RBC AUTO: 93.5 FL (ref 82–98)
MONOCYTES # BLD AUTO: 0.45 10*3/MM3 (ref 0.19–1.3)
MONOCYTES NFR BLD AUTO: 4.8 % (ref 4–12)
NEUTROPHILS # BLD AUTO: 8.04 10*3/MM3 (ref 1.87–8.4)
NEUTROPHILS NFR BLD AUTO: 85.3 % (ref 39–78)
NRBC BLD AUTO-RTO: 0 /100 WBC (ref 0–0)
PLATELET # BLD AUTO: 302 10*3/MM3 (ref 130–400)
PMV BLD AUTO: 10.2 FL (ref 6–12)
POTASSIUM BLD-SCNC: 3.9 MMOL/L (ref 3.5–5.3)
PROT SERPL-MCNC: 6.4 G/DL (ref 6.3–8.7)
PTH-INTACT SERPL-MCNC: 332.4 PG/ML (ref 7.5–53.5)
RBC # BLD AUTO: 3.54 10*6/MM3 (ref 4.2–5.4)
SODIUM BLD-SCNC: 134 MMOL/L (ref 135–145)
WBC NRBC COR # BLD: 9.43 10*3/MM3 (ref 4.8–10.8)

## 2018-12-26 PROCEDURE — 82043 UR ALBUMIN QUANTITATIVE: CPT | Performed by: INTERNAL MEDICINE

## 2018-12-26 PROCEDURE — 36415 COLL VENOUS BLD VENIPUNCTURE: CPT

## 2018-12-26 PROCEDURE — 99214 OFFICE O/P EST MOD 30 MIN: CPT | Performed by: INTERNAL MEDICINE

## 2018-12-26 PROCEDURE — 25010000002 CYANOCOBALAMIN PER 1000 MCG: Performed by: INTERNAL MEDICINE

## 2018-12-26 PROCEDURE — 83970 ASSAY OF PARATHORMONE: CPT | Performed by: INTERNAL MEDICINE

## 2018-12-26 PROCEDURE — 85025 COMPLETE CBC W/AUTO DIFF WBC: CPT | Performed by: INTERNAL MEDICINE

## 2018-12-26 PROCEDURE — 25010000002 EPOETIN ALFA PER 1000 UNITS: Performed by: INTERNAL MEDICINE

## 2018-12-26 PROCEDURE — 96372 THER/PROPH/DIAG INJ SC/IM: CPT

## 2018-12-26 PROCEDURE — 80053 COMPREHEN METABOLIC PANEL: CPT | Performed by: INTERNAL MEDICINE

## 2018-12-26 PROCEDURE — 82570 ASSAY OF URINE CREATININE: CPT | Performed by: INTERNAL MEDICINE

## 2018-12-26 RX ORDER — CYANOCOBALAMIN 1000 UG/ML
1000 INJECTION, SOLUTION INTRAMUSCULAR; SUBCUTANEOUS ONCE
Status: COMPLETED | OUTPATIENT
Start: 2018-12-26 | End: 2018-12-26

## 2018-12-26 RX ADMIN — ERYTHROPOIETIN 40000 UNITS: 40000 INJECTION, SOLUTION INTRAVENOUS; SUBCUTANEOUS at 11:42

## 2018-12-26 RX ADMIN — CYANOCOBALAMIN 1000 MCG: 1000 INJECTION, SOLUTION INTRAMUSCULAR at 11:45

## 2018-12-26 NOTE — PROGRESS NOTES
Patient ID:   Susanne Valentine is a 78 y.o. female.  Referring Physician:   García Wood MD  [address] [facility] [address]  [place] [id number]  Primary Care Provider:  Kaden Grande MD    Assessment/Plan:  Assessment   Patient Active Problem List   Diagnosis   • Anemia of chronic kidney failure   • Anemia of chronic renal failure, stage 3 (moderate) (CMS/HCC)   • B12 deficiency     Cancer Staging Information:  No matching staging information was found for the patient.    Susanne was seen today for anemia.    Diagnoses and all orders for this visit:    Anemia of chronic renal failure, stage 3 (moderate) (CMS/HCC)  -     Clinic Appointment Request  -     CBC and Differential; Future  -     Lab Appointment Request; Future  -     STAT Comprehensive Metabolic Panel; Future  -     Clinic Appointment Request; Future  -     ONCBCN INFUSION APPOINTMENT REQUEST 01; Future    Anemia of chronic kidney failure, stage 3 (moderate) (CMS/HCC)  -     Clinic Appointment Request  -     CBC and Differential; Future  -     Lab Appointment Request; Future  -     STAT Comprehensive Metabolic Panel; Future  -     Clinic Appointment Request; Future  -     ONCBCN INFUSION APPOINTMENT REQUEST 01; Future      The patient has a stable anemia from her chronic kidney disease, with the use of Procrit once a month as necessary.  We will hold the treatment today, and have her come back in 1 month with a repeat CBC, we will proceed with Procrit at 40,000 units subcutaneously if her hemoglobin is less than 10 g/DL.  Patient ID:   Susanne Valentine is a 78 y.o. female.  Referring Physician:   García Wood MD  [address] [facility] [address]  [place] [address]  Primary Care Provider:  Kaden Grande MD    Assessment/Plan:  Assessment   Patient Active Problem List   Diagnosis   • Anemia of chronic kidney failure   • Anemia of chronic renal failure, stage 3 (moderate) (CMS/HCC)   • B12 deficiency     Cancer Staging  Information:  No matching staging information was found for the patient.    Susanne was seen today for anemia.    Diagnoses and all orders for this visit:    Anemia of chronic renal failure, stage 3 (moderate) (CMS/[facility])  -     Clinic Appointment Request  -     CBC and Differential; Future  -     Lab Appointment Request; Future  -     STAT Comprehensive Metabolic Panel; Future  -     Clinic Appointment Request; Future  -     ONCBCN INFUSION APPOINTMENT REQUEST 01; Future    Anemia of chronic kidney failure, stage 3 (moderate) (CMS/HCC)  -     Clinic Appointment Request  -     CBC and Differential; Future  -     Lab Appointment Request; Future  -     STAT Comprehensive Metabolic Panel; Future  -     Clinic Appointment Request; Future  -     ONCBCN INFUSION APPOINTMENT REQUEST ; Future      The patient has a stable anemia from her chronic kidney disease, with the use of Procrit once a month as necessary.  We will hold the treatment today, and have her come back in 1 month with a repeat CBC, we will proceed with Procrit at 40,000 units subcutaneously if her hemoglobin is less than 10 g/DL.     Interval History:  The patient is here for follow-up and for possible consideration for Procrit.  She has no new complaints at this time and has been doing well.  Apparently her   5 years ago, and she reminded me that I am seeing her in the past when I was still here.  She did not get any Procrit on her last visit    Interval Notes:  The following portions of the patient's history were reviewed and updated as appropriate: allergies, current medications, past family history, past medical history, past social history, past surgical history and problem list.     History of Present Illness   76-year-old white lady with chronic kidney disease has an anemia and has been on ESAs since . She has been tolerating it well and her anemia has been stable with a supplement.    Review of Systems   Constitutional: Negative.   "  HENT: Negative.    Eyes: Negative.    Respiratory: Negative.    Cardiovascular: Negative.    Gastrointestinal: Negative.    Endocrine: Negative.    Genitourinary: Negative.    Musculoskeletal: Negative.    Allergic/Immunologic: Negative.    Neurological: Negative.    Hematological: Negative.    Psychiatric/Behavioral: Negative.         Physical Exam:  Vital Signs for this encounter:  BSA: 1.63 meters squared  /86   Pulse 92   Temp 96.9 °F (36.1 °C) (Tympanic)   Resp 16   Ht 157.5 cm (62\")   Wt 62.6 kg (137 lb 14.4 oz)   SpO2 97%   Breastfeeding? No   BMI 25.22 kg/m²     Physical Exam   Constitutional: She is oriented to person, place, and time. She appears well-developed and well-nourished. No distress.   HENT:   Head: Normocephalic and atraumatic.   Nose: Nose normal.   Mouth/Throat: Oropharynx is clear and moist. No oropharyngeal exudate.   Eyes: Conjunctivae and EOM are normal. Pupils are equal, round, and reactive to light. No scleral icterus.   Neck: Normal range of motion. Neck supple. No JVD present. No thyromegaly present.   Cardiovascular: Normal rate, regular rhythm and normal heart sounds.  Exam reveals no gallop and no friction rub.    Pulmonary/Chest: Effort normal and breath sounds normal. No respiratory distress. She has no wheezes. She has no rales. She exhibits no tenderness.   Abdominal: Soft. Bowel sounds are normal. She exhibits no distension. There is no tenderness. There is no guarding.   Musculoskeletal: Normal range of motion. She exhibits no edema or tenderness.   Lymphadenopathy:     She has no cervical adenopathy.   Neurological: She is alert and oriented to person, place, and time. No cranial nerve deficit.   Skin: Skin is warm and dry.   Few purpuric spots, forearms   Psychiatric: She has a normal mood and affect. Her behavior is normal. Judgment and thought content normal.       Performance Status:  Asymptomatic    Results:  WBC   Date Value Ref Range Status   12/26/2018 " "9.43 4.80 - 10.80 10*3/mm3 Final     Hemoglobin   Date Value Ref Range Status   2018 10.4 (L) 12.0 - 16.0 g/dL Final     Hematocrit   Date Value Ref Range Status   2018 33.1 (L) 37.0 - 47.0 % Final     Platelets   Date Value Ref Range Status   2018 302 130 - 400 10*3/mm3 Final     Creatinine   Date Value Ref Range Status   2018 3.15 (H) 0.50 - 1.40 mg/dL Final     AST (SGOT)   Date Value Ref Range Status   2018 25 7 - 45 U/L Final        Interval History:  The patient is here for follow-up and for possible consideration for Procrit.  She has no new complaints at this time and has been doing well.  Apparently her   5 years ago, and she reminded me that I am seeing her in the past when I was still here.  She did not get any Procrit on her last visit    Interval Notes:  The following portions of the patient's history were reviewed and updated as appropriate: allergies, current medications, past family history, past medical history, past social history, past surgical history and problem list.     Anemia        76-year-old white lady with chronic kidney disease has an anemia and has been on ESAs since . She has been tolerating it well and her anemia has been stable with a supplement.    Review of Systems   Constitutional: Negative.    HENT: Negative.    Eyes: Negative.    Respiratory: Negative.    Cardiovascular: Negative.    Gastrointestinal: Negative.    Endocrine: Negative.    Genitourinary: Negative.    Musculoskeletal: Negative.    Allergic/Immunologic: Negative.    Neurological: Negative.    Hematological: Negative.    Psychiatric/Behavioral: Negative.         Physical Exam:  Vital Signs for this encounter:  BSA: 1.63 meters squared  /86   Pulse 92   Temp 96.9 °F (36.1 °C) (Tympanic)   Resp 16   Ht 157.5 cm (62\")   Wt 62.6 kg (137 lb 14.4 oz)   SpO2 97%   Breastfeeding? No   BMI 25.22 kg/m²     Physical Exam   Constitutional: She is oriented to person, " place, and time. She appears well-developed and well-nourished. No distress.   HENT:   Head: Normocephalic and atraumatic.   Nose: Nose normal.   Mouth/Throat: Oropharynx is clear and moist. No oropharyngeal exudate.   Eyes: Conjunctivae and EOM are normal. Pupils are equal, round, and reactive to light. No scleral icterus.   Neck: Normal range of motion. Neck supple. No JVD present. No thyromegaly present.   Cardiovascular: Normal rate, regular rhythm and normal heart sounds. Exam reveals no gallop and no friction rub.   Pulmonary/Chest: Effort normal and breath sounds normal. No respiratory distress. She has no wheezes. She has no rales. She exhibits no tenderness.   Abdominal: Soft. Bowel sounds are normal. She exhibits no distension. There is no tenderness. There is no guarding.   Musculoskeletal: Normal range of motion. She exhibits no edema or tenderness.   Lymphadenopathy:     She has no cervical adenopathy.   Neurological: She is alert and oriented to person, place, and time. No cranial nerve deficit.   Skin: Skin is warm and dry.   Few purpuric spots, forearms   Psychiatric: She has a normal mood and affect. Her behavior is normal. Judgment and thought content normal.       Performance Status:  Asymptomatic    Results:  WBC   Date Value Ref Range Status   12/26/2018 9.43 4.80 - 10.80 10*3/mm3 Final     Hemoglobin   Date Value Ref Range Status   12/26/2018 10.4 (L) 12.0 - 16.0 g/dL Final     Hematocrit   Date Value Ref Range Status   12/26/2018 33.1 (L) 37.0 - 47.0 % Final     Platelets   Date Value Ref Range Status   12/26/2018 302 130 - 400 10*3/mm3 Final     Creatinine   Date Value Ref Range Status   12/26/2018 3.15 (H) 0.50 - 1.40 mg/dL Final     AST (SGOT)   Date Value Ref Range Status   12/26/2018 25 7 - 45 U/L Final     Anemia of chronic kidney disease.  Hemoglobin today is 10.4gm%,  continue Procrit.  Patient clinically asymptomatic.  Over the last 3 months for MCV of, therefore I am would check  vitamin B12 and folic acid on today's draw and I have asked her to start taking oral folic acid 400 µg daily as well as start receiving vitamin B12 1000 µg subcutaneous every 4 weekly.  If the MCV remains high in another 3-4 months I may consider doing a bone marrow biopsy to diagnose myelodysplasia.  The last colonoscopy was done within the last 5 years and a verbal report given to me by the patient it was negative.    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 I am not sure if ACTHER has help improve her Hgb 11.1gm%today.  Any way per CMS guidelines, if the Hg 7.4 therefore Procrit today. Will draw Iron studies today. Clinically doing well, says she worked in the yard over the weekend. ACTH is stopped by Nephrology Svx. Recheck CBC Wed, if Iron is low will infuse Iron, however, if Hgb less then 8gm, will xfuse PRBC ( apparently she bled from Heammorhoids ).    Repeat Hgb today 7.6, however, pt clinically asymptomatic, therefore, will OBSERVE and recheck CBC again on Monday. Meanwhile check stools for Ob.  Toady Hgb still low at 7.4gm%, however pt is doing fine clinically asymptomatic. I believe she is dilutional from Volume overload 3rd space fluid from ACHTAR that was Rx and stopped last month.  Since she is asymptomatic, will OBSERVE and redo her CBC next week. However, I have advised her that my cuit off for transfusion is Hgb 7gm%.    1/3 stool cards +OB. Hgb today 7.2 however, pt is completely asymptomatic and able to preform ADLs. Will increase dose Procrit to 54482 units due May 29th.  Meanwhile check Hgb wkly.    Creatinine increased to 3.09, with worsening renal failure, there is decreasing Hgb. If the Hgb is less then 8gm% next week, then the plan is to increase the Procrit to 60,000 Units every month STARTING TODAY.    She was inhouse 5 days ago with electrolyte imbalance from fluid  overload, causing dilutional Hyponatremia and dilutional anemia. Apparently she received IV diuretics and was D/cd on fluid restriction. In house she also received 1 U PRBC. Therefore, today her Hgb is above 9.4. I am therefore, contemplating to bring back the Procrit dose to 40,000 units.     She is OFF of the tacrolimus x 3 days and now on Steroids, Prednisone 10mg daily. Hgb is 10.4 today, Rx Procrit. Gained 3Ilbs wt and 3+ pitting odema lower extrem.

## 2018-12-27 LAB
CREAT 24H UR-MCNC: 21.5 MG/DL
MICROALBUMIN UR-MCNC: 1346.7 UG/ML
MICROALBUMIN/CREAT UR: 6263.7 MG/G CREAT (ref 0–30)

## 2019-01-28 ENCOUNTER — OFFICE VISIT (OUTPATIENT)
Dept: ONCOLOGY | Facility: CLINIC | Age: 79
End: 2019-01-28

## 2019-01-28 ENCOUNTER — LAB (OUTPATIENT)
Dept: LAB | Facility: HOSPITAL | Age: 79
End: 2019-01-28

## 2019-01-28 ENCOUNTER — INFUSION (OUTPATIENT)
Dept: ONCOLOGY | Facility: HOSPITAL | Age: 79
End: 2019-01-28
Attending: INTERNAL MEDICINE

## 2019-01-28 VITALS
HEART RATE: 83 BPM | DIASTOLIC BLOOD PRESSURE: 78 MMHG | HEIGHT: 62 IN | RESPIRATION RATE: 18 BRPM | OXYGEN SATURATION: 100 % | WEIGHT: 140 LBS | SYSTOLIC BLOOD PRESSURE: 173 MMHG | TEMPERATURE: 96.8 F | BODY MASS INDEX: 25.76 KG/M2

## 2019-01-28 VITALS
HEART RATE: 104 BPM | BODY MASS INDEX: 25.75 KG/M2 | TEMPERATURE: 97.8 F | WEIGHT: 139.9 LBS | HEIGHT: 62 IN | OXYGEN SATURATION: 94 % | RESPIRATION RATE: 16 BRPM | DIASTOLIC BLOOD PRESSURE: 86 MMHG | SYSTOLIC BLOOD PRESSURE: 172 MMHG

## 2019-01-28 DIAGNOSIS — N18.30 ANEMIA OF CHRONIC KIDNEY FAILURE, STAGE 3 (MODERATE) (HCC): ICD-10-CM

## 2019-01-28 DIAGNOSIS — N18.4 CHRONIC KIDNEY DISEASE, STAGE IV (SEVERE) (HCC): ICD-10-CM

## 2019-01-28 DIAGNOSIS — N18.30 ANEMIA OF CHRONIC RENAL FAILURE, STAGE 3 (MODERATE) (HCC): ICD-10-CM

## 2019-01-28 DIAGNOSIS — N18.30 ANEMIA OF CHRONIC RENAL FAILURE, STAGE 3 (MODERATE) (HCC): Primary | ICD-10-CM

## 2019-01-28 DIAGNOSIS — D63.1 ANEMIA OF CHRONIC KIDNEY FAILURE, STAGE 3 (MODERATE) (HCC): ICD-10-CM

## 2019-01-28 DIAGNOSIS — D63.1 ANEMIA OF CHRONIC RENAL FAILURE, STAGE 3 (MODERATE) (HCC): Primary | ICD-10-CM

## 2019-01-28 DIAGNOSIS — D63.1 ANEMIA OF CHRONIC RENAL FAILURE, STAGE 3 (MODERATE) (HCC): ICD-10-CM

## 2019-01-28 LAB
ALBUMIN SERPL-MCNC: 3.6 G/DL (ref 3.5–5)
ALBUMIN/GLOB SERPL: 1.3 G/DL (ref 1.1–2.5)
ALP SERPL-CCNC: 61 U/L (ref 24–120)
ALT SERPL W P-5'-P-CCNC: 17 U/L (ref 0–54)
ANION GAP SERPL CALCULATED.3IONS-SCNC: 11 MMOL/L (ref 4–13)
AST SERPL-CCNC: 21 U/L (ref 7–45)
BASOPHILS # BLD AUTO: 0.07 10*3/MM3 (ref 0–0.2)
BASOPHILS NFR BLD AUTO: 0.7 % (ref 0–2)
BILIRUB SERPL-MCNC: 0.4 MG/DL (ref 0.1–1)
BUN BLD-MCNC: 62 MG/DL (ref 5–21)
BUN/CREAT SERPL: 16.6 (ref 7–25)
CALCIUM SPEC-SCNC: 10.3 MG/DL (ref 8.4–10.4)
CHLORIDE SERPL-SCNC: 101 MMOL/L (ref 98–110)
CO2 SERPL-SCNC: 22 MMOL/L (ref 24–31)
CREAT BLD-MCNC: 3.74 MG/DL (ref 0.5–1.4)
DEPRECATED RDW RBC AUTO: 53.2 FL (ref 40–54)
EOSINOPHIL # BLD AUTO: 0.05 10*3/MM3 (ref 0–0.7)
EOSINOPHIL NFR BLD AUTO: 0.5 % (ref 0–4)
ERYTHROCYTE [DISTWIDTH] IN BLOOD BY AUTOMATED COUNT: 15.3 % (ref 12–15)
GFR SERPL CREATININE-BSD FRML MDRD: 12 ML/MIN/1.73
GFR SERPL CREATININE-BSD FRML MDRD: ABNORMAL ML/MIN/1.73
GLOBULIN UR ELPH-MCNC: 2.8 GM/DL
GLUCOSE BLD-MCNC: 86 MG/DL (ref 70–100)
HCT VFR BLD AUTO: 31.4 % (ref 37–47)
HGB BLD-MCNC: 10.3 G/DL (ref 12–16)
HOLD SPECIMEN: NORMAL
HOLD SPECIMEN: NORMAL
IMM GRANULOCYTES # BLD AUTO: 0.11 10*3/MM3 (ref 0–0.03)
IMM GRANULOCYTES NFR BLD AUTO: 1.1 % (ref 0–5)
LYMPHOCYTES # BLD AUTO: 3.59 10*3/MM3 (ref 0.72–4.86)
LYMPHOCYTES NFR BLD AUTO: 35.6 % (ref 15–45)
MCH RBC QN AUTO: 30.9 PG (ref 28–32)
MCHC RBC AUTO-ENTMCNC: 32.8 G/DL (ref 33–36)
MCV RBC AUTO: 94.3 FL (ref 82–98)
MONOCYTES # BLD AUTO: 1.12 10*3/MM3 (ref 0.19–1.3)
MONOCYTES NFR BLD AUTO: 11.1 % (ref 4–12)
NEUTROPHILS # BLD AUTO: 5.15 10*3/MM3 (ref 1.87–8.4)
NEUTROPHILS NFR BLD AUTO: 51 % (ref 39–78)
NRBC BLD AUTO-RTO: 0 /100 WBC (ref 0–0)
PLATELET # BLD AUTO: 270 10*3/MM3 (ref 130–400)
PMV BLD AUTO: 10 FL (ref 6–12)
POTASSIUM BLD-SCNC: 3.5 MMOL/L (ref 3.5–5.3)
PROT SERPL-MCNC: 6.4 G/DL (ref 6.3–8.7)
RBC # BLD AUTO: 3.33 10*6/MM3 (ref 4.2–5.4)
SODIUM BLD-SCNC: 134 MMOL/L (ref 135–145)
WBC NRBC COR # BLD: 10.09 10*3/MM3 (ref 4.8–10.8)

## 2019-01-28 PROCEDURE — 80053 COMPREHEN METABOLIC PANEL: CPT | Performed by: INTERNAL MEDICINE

## 2019-01-28 PROCEDURE — 25010000002 EPOETIN ALFA PER 1000 UNITS: Performed by: INTERNAL MEDICINE

## 2019-01-28 PROCEDURE — 99214 OFFICE O/P EST MOD 30 MIN: CPT | Performed by: INTERNAL MEDICINE

## 2019-01-28 PROCEDURE — 25010000002 CYANOCOBALAMIN PER 1000 MCG: Performed by: INTERNAL MEDICINE

## 2019-01-28 PROCEDURE — 85025 COMPLETE CBC W/AUTO DIFF WBC: CPT | Performed by: INTERNAL MEDICINE

## 2019-01-28 PROCEDURE — 96372 THER/PROPH/DIAG INJ SC/IM: CPT

## 2019-01-28 PROCEDURE — 36415 COLL VENOUS BLD VENIPUNCTURE: CPT

## 2019-01-28 RX ORDER — CYANOCOBALAMIN 1000 UG/ML
1000 INJECTION, SOLUTION INTRAMUSCULAR; SUBCUTANEOUS ONCE
Status: CANCELLED
Start: 2019-01-28 | End: 2019-01-28

## 2019-01-28 RX ORDER — CALCITRIOL 0.5 UG/1
CAPSULE, LIQUID FILLED ORAL
COMMUNITY
Start: 2019-01-02

## 2019-01-28 RX ORDER — POTASSIUM CHLORIDE 20 MEQ/1
TABLET, EXTENDED RELEASE ORAL
COMMUNITY
Start: 2019-01-17

## 2019-01-28 RX ORDER — CYANOCOBALAMIN 1000 UG/ML
1000 INJECTION, SOLUTION INTRAMUSCULAR; SUBCUTANEOUS ONCE
Status: COMPLETED | OUTPATIENT
Start: 2019-01-28 | End: 2019-01-28

## 2019-01-28 RX ADMIN — CYANOCOBALAMIN 1000 MCG: 1000 INJECTION, SOLUTION INTRAMUSCULAR at 10:22

## 2019-01-28 RX ADMIN — ERYTHROPOIETIN 40000 UNITS: 40000 INJECTION, SOLUTION INTRAVENOUS; SUBCUTANEOUS at 10:25

## 2019-02-21 ENCOUNTER — HOSPITAL ENCOUNTER (OUTPATIENT)
Dept: PREADMISSION TESTING | Age: 79
Discharge: HOME | End: 2019-02-25
Payer: MEDICARE

## 2019-02-21 ENCOUNTER — OFFICE VISIT (OUTPATIENT)
Dept: SURGERY | Age: 79
End: 2019-02-21
Payer: MEDICARE

## 2019-02-21 VITALS
HEIGHT: 62 IN | BODY MASS INDEX: 26.24 KG/M2 | SYSTOLIC BLOOD PRESSURE: 152 MMHG | TEMPERATURE: 97 F | WEIGHT: 142.6 LBS | DIASTOLIC BLOOD PRESSURE: 84 MMHG

## 2019-02-21 DIAGNOSIS — N18.6 ESRD (END STAGE RENAL DISEASE) (HCC): Primary | ICD-10-CM

## 2019-02-21 PROCEDURE — 1123F ACP DISCUSS/DSCN MKR DOCD: CPT | Performed by: SURGERY

## 2019-02-21 PROCEDURE — 4040F PNEUMOC VAC/ADMIN/RCVD: CPT | Performed by: SURGERY

## 2019-02-21 PROCEDURE — 1101F PT FALLS ASSESS-DOCD LE1/YR: CPT | Performed by: SURGERY

## 2019-02-21 PROCEDURE — G8399 PT W/DXA RESULTS DOCUMENT: HCPCS | Performed by: SURGERY

## 2019-02-21 PROCEDURE — G8427 DOCREV CUR MEDS BY ELIG CLIN: HCPCS | Performed by: SURGERY

## 2019-02-21 PROCEDURE — 93005 ELECTROCARDIOGRAM TRACING: CPT

## 2019-02-21 PROCEDURE — 1036F TOBACCO NON-USER: CPT | Performed by: SURGERY

## 2019-02-21 PROCEDURE — 99204 OFFICE O/P NEW MOD 45 MIN: CPT | Performed by: SURGERY

## 2019-02-21 PROCEDURE — G8419 CALC BMI OUT NRM PARAM NOF/U: HCPCS | Performed by: SURGERY

## 2019-02-21 PROCEDURE — G8484 FLU IMMUNIZE NO ADMIN: HCPCS | Performed by: SURGERY

## 2019-02-21 PROCEDURE — 1090F PRES/ABSN URINE INCON ASSESS: CPT | Performed by: SURGERY

## 2019-02-21 RX ORDER — CALCITRIOL 0.25 UG/1
0.5 CAPSULE, LIQUID FILLED ORAL DAILY
COMMUNITY
End: 2019-03-28 | Stop reason: ALTCHOICE

## 2019-02-21 RX ORDER — LEVOTHYROXINE SODIUM 0.05 MG/1
50 TABLET ORAL DAILY
COMMUNITY
End: 2019-04-18 | Stop reason: SDUPTHER

## 2019-02-21 RX ORDER — POTASSIUM CHLORIDE 1.5 G/1.77G
20 POWDER, FOR SOLUTION ORAL DAILY
COMMUNITY

## 2019-02-21 RX ORDER — PREDNISONE 1 MG/1
2.5 TABLET ORAL DAILY
COMMUNITY
End: 2019-03-28 | Stop reason: ALTCHOICE

## 2019-02-22 LAB
EKG P AXIS: 63 DEGREES
EKG P-R INTERVAL: 138 MS
EKG Q-T INTERVAL: 332 MS
EKG QRS DURATION: 118 MS
EKG QTC CALCULATION (BAZETT): 414 MS
EKG T AXIS: 67 DEGREES

## 2019-02-25 ENCOUNTER — OFFICE VISIT (OUTPATIENT)
Dept: ONCOLOGY | Facility: CLINIC | Age: 79
End: 2019-02-25

## 2019-02-25 ENCOUNTER — LAB (OUTPATIENT)
Dept: LAB | Facility: HOSPITAL | Age: 79
End: 2019-02-25
Attending: INTERNAL MEDICINE

## 2019-02-25 ENCOUNTER — INFUSION (OUTPATIENT)
Dept: ONCOLOGY | Facility: HOSPITAL | Age: 79
End: 2019-02-25
Attending: INTERNAL MEDICINE

## 2019-02-25 VITALS
SYSTOLIC BLOOD PRESSURE: 155 MMHG | WEIGHT: 144 LBS | DIASTOLIC BLOOD PRESSURE: 79 MMHG | OXYGEN SATURATION: 100 % | HEART RATE: 83 BPM | HEIGHT: 62 IN | BODY MASS INDEX: 26.5 KG/M2 | RESPIRATION RATE: 16 BRPM | TEMPERATURE: 97.1 F

## 2019-02-25 VITALS
RESPIRATION RATE: 16 BRPM | TEMPERATURE: 97.7 F | DIASTOLIC BLOOD PRESSURE: 88 MMHG | HEIGHT: 62 IN | HEART RATE: 92 BPM | WEIGHT: 142.8 LBS | OXYGEN SATURATION: 97 % | BODY MASS INDEX: 26.28 KG/M2 | SYSTOLIC BLOOD PRESSURE: 158 MMHG

## 2019-02-25 DIAGNOSIS — N18.30 ANEMIA OF CHRONIC RENAL FAILURE, STAGE 3 (MODERATE) (HCC): Primary | ICD-10-CM

## 2019-02-25 DIAGNOSIS — D63.1 ANEMIA OF CHRONIC RENAL FAILURE, STAGE 3 (MODERATE) (HCC): ICD-10-CM

## 2019-02-25 DIAGNOSIS — N18.30 ANEMIA OF CHRONIC KIDNEY FAILURE, STAGE 3 (MODERATE) (HCC): ICD-10-CM

## 2019-02-25 DIAGNOSIS — N18.30 ANEMIA OF CHRONIC RENAL FAILURE, STAGE 3 (MODERATE) (HCC): ICD-10-CM

## 2019-02-25 DIAGNOSIS — D63.1 ANEMIA OF CHRONIC RENAL FAILURE, STAGE 3 (MODERATE) (HCC): Primary | ICD-10-CM

## 2019-02-25 DIAGNOSIS — D63.1 ANEMIA OF CHRONIC KIDNEY FAILURE, STAGE 3 (MODERATE) (HCC): ICD-10-CM

## 2019-02-25 LAB
ALBUMIN SERPL-MCNC: 3.7 G/DL (ref 3.5–5)
ALBUMIN/GLOB SERPL: 1.3 G/DL (ref 1.1–2.5)
ALP SERPL-CCNC: 60 U/L (ref 24–120)
ALT SERPL W P-5'-P-CCNC: 17 U/L (ref 0–54)
ANION GAP SERPL CALCULATED.3IONS-SCNC: 8 MMOL/L (ref 4–13)
AST SERPL-CCNC: 21 U/L (ref 7–45)
BASOPHILS # BLD AUTO: 0.04 10*3/MM3 (ref 0–0.2)
BASOPHILS NFR BLD AUTO: 0.5 % (ref 0–2)
BILIRUB SERPL-MCNC: 0.3 MG/DL (ref 0.1–1)
BUN BLD-MCNC: 47 MG/DL (ref 5–21)
BUN/CREAT SERPL: 11.6 (ref 7–25)
CALCIUM SPEC-SCNC: 8.3 MG/DL (ref 8.4–10.4)
CHLORIDE SERPL-SCNC: 100 MMOL/L (ref 98–110)
CO2 SERPL-SCNC: 23 MMOL/L (ref 24–31)
CREAT BLD-MCNC: 4.06 MG/DL (ref 0.5–1.4)
DEPRECATED RDW RBC AUTO: 51.3 FL (ref 40–54)
EOSINOPHIL # BLD AUTO: 0.14 10*3/MM3 (ref 0–0.7)
EOSINOPHIL NFR BLD AUTO: 1.6 % (ref 0–4)
ERYTHROCYTE [DISTWIDTH] IN BLOOD BY AUTOMATED COUNT: 14.4 % (ref 12–15)
FERRITIN SERPL-MCNC: 26.8 NG/ML (ref 11.1–264)
GFR SERPL CREATININE-BSD FRML MDRD: 11 ML/MIN/1.73
GFR SERPL CREATININE-BSD FRML MDRD: ABNORMAL ML/MIN/1.73
GLOBULIN UR ELPH-MCNC: 2.9 GM/DL
GLUCOSE BLD-MCNC: 92 MG/DL (ref 70–100)
HCT VFR BLD AUTO: 32.4 % (ref 37–47)
HGB BLD-MCNC: 10.2 G/DL (ref 12–16)
HOLD SPECIMEN: NORMAL
HOLD SPECIMEN: NORMAL
IMM GRANULOCYTES # BLD AUTO: 0.05 10*3/MM3 (ref 0–0.05)
IMM GRANULOCYTES NFR BLD AUTO: 0.6 % (ref 0–5)
IRON 24H UR-MRATE: 62 MCG/DL (ref 42–180)
IRON SATN MFR SERPL: 22 % (ref 20–45)
LYMPHOCYTES # BLD AUTO: 2.25 10*3/MM3 (ref 0.72–4.86)
LYMPHOCYTES NFR BLD AUTO: 25.4 % (ref 15–45)
MCH RBC QN AUTO: 30.9 PG (ref 28–32)
MCHC RBC AUTO-ENTMCNC: 31.5 G/DL (ref 33–36)
MCV RBC AUTO: 98.2 FL (ref 82–98)
MONOCYTES # BLD AUTO: 0.99 10*3/MM3 (ref 0.19–1.3)
MONOCYTES NFR BLD AUTO: 11.2 % (ref 4–12)
NEUTROPHILS # BLD AUTO: 5.4 10*3/MM3 (ref 1.87–8.4)
NEUTROPHILS NFR BLD AUTO: 60.7 % (ref 39–78)
NRBC BLD AUTO-RTO: 0 /100 WBC (ref 0–0)
PLATELET # BLD AUTO: 267 10*3/MM3 (ref 130–400)
PMV BLD AUTO: 10.1 FL (ref 6–12)
POTASSIUM BLD-SCNC: 3.2 MMOL/L (ref 3.5–5.3)
PROT SERPL-MCNC: 6.6 G/DL (ref 6.3–8.7)
RBC # BLD AUTO: 3.3 10*6/MM3 (ref 4.2–5.4)
SODIUM BLD-SCNC: 131 MMOL/L (ref 135–145)
TIBC SERPL-MCNC: 276 MCG/DL (ref 225–420)
WBC NRBC COR # BLD: 8.87 10*3/MM3 (ref 4.8–10.8)

## 2019-02-25 PROCEDURE — 25010000002 EPOETIN ALFA PER 1000 UNITS: Performed by: INTERNAL MEDICINE

## 2019-02-25 PROCEDURE — 85025 COMPLETE CBC W/AUTO DIFF WBC: CPT

## 2019-02-25 PROCEDURE — 80053 COMPREHEN METABOLIC PANEL: CPT

## 2019-02-25 PROCEDURE — 36415 COLL VENOUS BLD VENIPUNCTURE: CPT

## 2019-02-25 PROCEDURE — 83540 ASSAY OF IRON: CPT | Performed by: INTERNAL MEDICINE

## 2019-02-25 PROCEDURE — 99214 OFFICE O/P EST MOD 30 MIN: CPT | Performed by: INTERNAL MEDICINE

## 2019-02-25 PROCEDURE — 25010000002 CYANOCOBALAMIN PER 1000 MCG: Performed by: INTERNAL MEDICINE

## 2019-02-25 PROCEDURE — 96372 THER/PROPH/DIAG INJ SC/IM: CPT

## 2019-02-25 PROCEDURE — 83550 IRON BINDING TEST: CPT | Performed by: INTERNAL MEDICINE

## 2019-02-25 PROCEDURE — 82728 ASSAY OF FERRITIN: CPT | Performed by: INTERNAL MEDICINE

## 2019-02-25 RX ORDER — CYANOCOBALAMIN 1000 UG/ML
1000 INJECTION, SOLUTION INTRAMUSCULAR; SUBCUTANEOUS ONCE
Status: CANCELLED
Start: 2019-02-25 | End: 2019-02-25

## 2019-02-25 RX ORDER — CYANOCOBALAMIN 1000 UG/ML
1000 INJECTION, SOLUTION INTRAMUSCULAR; SUBCUTANEOUS ONCE
Status: CANCELLED
Start: 2019-03-25 | End: 2019-03-25

## 2019-02-25 RX ORDER — CYANOCOBALAMIN 1000 UG/ML
1000 INJECTION, SOLUTION INTRAMUSCULAR; SUBCUTANEOUS ONCE
Status: COMPLETED | OUTPATIENT
Start: 2019-02-25 | End: 2019-02-25

## 2019-02-25 RX ADMIN — ERYTHROPOIETIN 40000 UNITS: 40000 INJECTION, SOLUTION INTRAVENOUS; SUBCUTANEOUS at 10:28

## 2019-02-25 RX ADMIN — CYANOCOBALAMIN 1000 MCG: 1000 INJECTION, SOLUTION INTRAMUSCULAR at 10:24

## 2019-02-25 NOTE — PROGRESS NOTES
Called Dr BARR office and spoke with her nurse Mary who said per Dr BARR give Procrt today for HGB of 10.2 and also give B12 injection today as well. Patient tolerated both injections well.

## 2019-02-25 NOTE — PROGRESS NOTES
[facility]  HEMATOLOGY & ONCOLOGY    Cancer Staging Information:  Cancer Staging  No matching staging information was found for the patient.      Subjective     VISIT DIAGNOSIS:   Encounter Diagnoses   Name Primary?   • Anemia of chronic renal failure, stage 3 (moderate) (CMS/HCC)    • Anemia of chronic kidney failure, stage 3 (moderate) (CMS/HCC)        REASON FOR VISIT:     Chief Complaint   Patient presents with   • Anemia     Here for procrit and b12.        HEMATOLOGY / ONCOLOGY HISTORY:    No history exists.           INTERVAL HISTORY  Patient ID: Susanne Valentine is a 78 y.o. year old female here for AoCKD on procrit.  -she will be having a port placed for peritoneal dialysis soon.   --Denies sob, cp, n,v, fever, melena, dizziness.   --rest of ros unremarkable.  Past Medical History:   Past Medical History:   Diagnosis Date   • Anemia     2007   • B12 deficiency    • CKD (chronic kidney disease), stage II     2006   • Colon polyps    • GERD (gastroesophageal reflux disease)    • Hiatal hernia     1993   • History of blood transfusion    • Hyperlipidemia     2006   • Hypertension    • Nephrotic syndrome     membranous glomerulonephritis: 2006    • Osteoporosis      Past Surgical History:   Past Surgical History:   Procedure Laterality Date   • ADENOIDECTOMY      1946   • BREAST EXCISIONAL BIOPSY Left 1990    benign   • CHOLECYSTECTOMY      1993   • HERNIA REPAIR      2006   • RENAL BIOPSY  07/17/2007   • TONSILLECTOMY      1946   • TUBAL ABDOMINAL LIGATION      1960     Social History:   Social History     Socioeconomic History   • Marital status:      Spouse name: Not on file   • Number of children: Not on file   • Years of education: Not on file   • Highest education level: Not on file   Social Needs   • Financial resource strain: Not on file   • Food insecurity - worry: Not on file   • Food insecurity - inability: Not on file   • Transportation needs - medical: Not on file   •  Transportation needs - non-medical: Not on file   Occupational History   • Not on file   Tobacco Use   • Smoking status: Never Smoker   Substance and Sexual Activity   • Alcohol use: No   • Drug use: Not on file   • Sexual activity: Not on file   Other Topics Concern   • Not on file   Social History Narrative   • Not on file     Family History:   Family History   Problem Relation Age of Onset   • Heart disease Mother    • Hypertension Mother    • Cancer Mother    • Breast cancer Mother    • Liver disease Father    • Heart disease Sister    • Anemia Sister    • Hyperlipidemia Son    • No Known Problems Brother    • No Known Problems Daughter    • No Known Problems Maternal Grandmother    • No Known Problems Paternal Grandmother    • No Known Problems Maternal Aunt    • No Known Problems Paternal Aunt    • BRCA 1/2 Neg Hx    • Colon cancer Neg Hx    • Endometrial cancer Neg Hx    • Ovarian cancer Neg Hx        Review of Systems   Constitutional: Negative.    HENT: Negative.    Eyes: Negative.    Respiratory: Negative.    Cardiovascular: Negative.    Gastrointestinal: Negative.    Endocrine: Negative.    Genitourinary: Negative.    Musculoskeletal: Negative.    Skin: Negative.    Neurological: Negative.    Hematological: Negative.    Psychiatric/Behavioral: Negative.         Performance Status:  Asymptomatic    Medications:    Current Outpatient Medications   Medication Sig Dispense Refill   • amLODIPine (NORVASC) 10 MG tablet      • aspirin 81 MG tablet Take 81 mg by mouth Daily.     • bumetanide (BUMEX) 2 MG tablet Take 2 mg by mouth 2 (Two) Times a Day.     • calcitriol (ROCALTROL) 0.5 MCG capsule      • Cholecalciferol (VITAMIN D3) 1000 UNITS capsule Take 5,000 Units by mouth.     • CloNIDine (CATAPRES) 0.2 MG tablet Take 0.2 mg by mouth.     • HP ACTHAR 80 UNIT/ML injectable gel      • levothyroxine (SYNTHROID, LEVOTHROID) 50 MCG tablet      • lisinopril (PRINIVIL,ZESTRIL) 40 MG tablet 20 mg.     • Multiple  "Vitamins-Minerals (PRESERVISION AREDS 2 PO) Take  by mouth 2 (Two) Times a Day.     • Omega-3 Fatty Acids (FISH OIL) 1000 MG capsule capsule Take 1,000 mg by mouth.     • potassium chloride (K-DUR,KLOR-CON) 20 MEQ CR tablet      • predniSONE (DELTASONE) 10 MG tablet Take 5 mg by mouth Daily. Taking 2.5mg daily now as of 2/25/2019     • sodium bicarbonate 325 MG tablet Take  by mouth 2 (Two) Times a Day.       No current facility-administered medications for this visit.        ALLERGIES:    Allergies   Allergen Reactions   • Metolazone Dizziness   • Statins Other (See Comments)     Muscle aches        Objective      Vitals:    02/25/19 0922   BP: 158/88   Pulse: 92   Resp: 16   Temp: 97.7 °F (36.5 °C)   TempSrc: Oral   SpO2: 97%   Weight: 64.8 kg (142 lb 12.8 oz)   Height: 157.5 cm (62\")         Current Status 2/25/2019   ECOG score 1         Physical Exam    General Appearance: Patient is awake, alert, oriented and in no acute distress. Patient is welldeveloped, wellnourished, and appears stated age.  HEENT: Normocephalic. Sclerae clear, conjunctiva pink, extraocular movements intact, pupils, round, reactive to light and  accommodation. Mouth and throat are clear with moist oral mucosa.  NECK: Supple, no jugular venous distention, thyroid not enlarged.  LYMPH: No cervical, supraclavicular, axillary, or inguinal lymphadenopathy.  CHEST: Equal bilateral expansion, AP  diameter normal, resonant percussion note  LUNGS: Good air movement, no rales, rhonchi, rubs or wheezes with auscultation  CARDIO: Regular sinus rhythm, no murmurs, gallops or rubs.  ABDOMEN: Nondistended, soft, No tenderness, no guarding, no rebound, No hepatosplenomegaly. No abdominal masses. Bowel sounds positive. No hernia  GENITALIA: Not examined.  BREASTS: Not examined.  MUSKEL: No joint swelling, decreased motion, or inflammation  EXTREMS: agnieszka LE edema, no clubbing, cyanosis, No varicose veins.  NEURO: Grossly nonfocal, Gait is coordinated and " smooth, Cognition is preserved.  SKIN: No rashes, no ecchymoses, no petechia.  PSYCH: Oriented to time, place and person. Memory is preserved. Mood and affect appear normal  RECENT LABS:  Lab on 02/25/2019   Component Date Value Ref Range Status   • Glucose 02/25/2019 92  70 - 100 mg/dL Final   • BUN 02/25/2019 47* 5 - 21 mg/dL Final   • Creatinine 02/25/2019 4.06* 0.50 - 1.40 mg/dL Final   • Sodium 02/25/2019 131* 135 - 145 mmol/L Final   • Potassium 02/25/2019 3.2* 3.5 - 5.3 mmol/L Final   • Chloride 02/25/2019 100  98 - 110 mmol/L Final   • CO2 02/25/2019 23.0* 24.0 - 31.0 mmol/L Final   • Calcium 02/25/2019 8.3* 8.4 - 10.4 mg/dL Final   • Total Protein 02/25/2019 6.6  6.3 - 8.7 g/dL Final   • Albumin 02/25/2019 3.70  3.50 - 5.00 g/dL Final   • ALT (SGPT) 02/25/2019 17  0 - 54 U/L Final   • AST (SGOT) 02/25/2019 21  7 - 45 U/L Final   • Alkaline Phosphatase 02/25/2019 60  24 - 120 U/L Final   • Total Bilirubin 02/25/2019 0.3  0.1 - 1.0 mg/dL Final   • eGFR Non African Amer 02/25/2019 11* >60 mL/min/1.73 Final    <15 Indicative of kidney failure.   • eGFR   Amer 02/25/2019   >60 mL/min/1.73 Final    <15 Indicative of kidney failure.   • Globulin 02/25/2019 2.9  gm/dL Final   • A/G Ratio 02/25/2019 1.3  1.1 - 2.5 g/dL Final   • BUN/Creatinine Ratio 02/25/2019 11.6  7.0 - 25.0 Final   • Anion Gap 02/25/2019 8.0  4.0 - 13.0 mmol/L Final   • WBC 02/25/2019 8.87  4.80 - 10.80 10*3/mm3 Final   • RBC 02/25/2019 3.30* 4.20 - 5.40 10*6/mm3 Final   • Hemoglobin 02/25/2019 10.2* 12.0 - 16.0 g/dL Final   • Hematocrit 02/25/2019 32.4* 37.0 - 47.0 % Final   • MCV 02/25/2019 98.2* 82.0 - 98.0 fL Final   • MCH 02/25/2019 30.9  28.0 - 32.0 pg Final   • MCHC 02/25/2019 31.5* 33.0 - 36.0 g/dL Final   • RDW 02/25/2019 14.4  12.0 - 15.0 % Final   • RDW-SD 02/25/2019 51.3  40.0 - 54.0 fl Final   • MPV 02/25/2019 10.1  6.0 - 12.0 fL Final   • Platelets 02/25/2019 267  130 - 400 10*3/mm3 Final   • Neutrophil % 02/25/2019 60.7   39.0 - 78.0 % Final   • Lymphocyte % 02/25/2019 25.4  15.0 - 45.0 % Final   • Monocyte % 02/25/2019 11.2  4.0 - 12.0 % Final   • Eosinophil % 02/25/2019 1.6  0.0 - 4.0 % Final   • Basophil % 02/25/2019 0.5  0.0 - 2.0 % Final   • Immature Grans % 02/25/2019 0.6  0.0 - 5.0 % Final   • Neutrophils, Absolute 02/25/2019 5.40  1.87 - 8.40 10*3/mm3 Final   • Lymphocytes, Absolute 02/25/2019 2.25  0.72 - 4.86 10*3/mm3 Final   • Monocytes, Absolute 02/25/2019 0.99  0.19 - 1.30 10*3/mm3 Final   • Eosinophils, Absolute 02/25/2019 0.14  0.00 - 0.70 10*3/mm3 Final   • Basophils, Absolute 02/25/2019 0.04  0.00 - 0.20 10*3/mm3 Final   • Immature Grans, Absolute 02/25/2019 0.05  0.00 - 0.05 10*3/mm3 Final   • nRBC 02/25/2019 0.0  0.0 - 0.0 /100 WBC Final       RADIOLOGY:  No results found.         Assessment/Plan  Susanne Valentine is a 78 y.o. year old female here for anemia of CKD that is stable.    Patient Active Problem List   Diagnosis   • Anemia of chronic kidney failure   • Anemia of chronic renal failure, stage 3 (moderate) (CMS/HCC)   • B12 deficiency          1. AoCKD: Hg 10.3, will give procrit today  --check iron profile, ferritin and act accordingly.  --rtc in a month    2. CKD 2/2 to nephrotic syndrome, on prednisone  -gfr 11 today. She is getting ready for port for peritoneal dialysis    3. Hypothyroidism: on levothyroxine    4. HTN: on amlodipine, clonidine    5. CHF: on bumex.  6. B12 def: on monthly shot         Severiano Colbert MD    2/25/2019    9:33 AM

## 2019-02-27 ENCOUNTER — PREP FOR PROCEDURE (OUTPATIENT)
Dept: SURGERY | Age: 79
End: 2019-02-27

## 2019-02-27 ENCOUNTER — HOSPITAL ENCOUNTER (OUTPATIENT)
Age: 79
Setting detail: OUTPATIENT SURGERY
Discharge: HOME | End: 2019-02-27
Attending: SURGERY | Admitting: SURGERY
Payer: MEDICARE

## 2019-02-27 ENCOUNTER — ANESTHESIA EVENT (OUTPATIENT)
Dept: OPERATING ROOM | Age: 79
End: 2019-02-27
Payer: MEDICARE

## 2019-02-27 ENCOUNTER — ANESTHESIA (OUTPATIENT)
Dept: OPERATING ROOM | Age: 79
End: 2019-02-27
Payer: MEDICARE

## 2019-02-27 VITALS
DIASTOLIC BLOOD PRESSURE: 53 MMHG | TEMPERATURE: 97 F | SYSTOLIC BLOOD PRESSURE: 99 MMHG | OXYGEN SATURATION: 98 % | RESPIRATION RATE: 9 BRPM

## 2019-02-27 VITALS
SYSTOLIC BLOOD PRESSURE: 152 MMHG | BODY MASS INDEX: 25.76 KG/M2 | HEIGHT: 62 IN | WEIGHT: 140 LBS | DIASTOLIC BLOOD PRESSURE: 90 MMHG | TEMPERATURE: 97.6 F | OXYGEN SATURATION: 100 % | HEART RATE: 99 BPM | RESPIRATION RATE: 20 BRPM

## 2019-02-27 DIAGNOSIS — Z99.2 PERITONEAL DIALYSIS CATHETER IN PLACE (HCC): ICD-10-CM

## 2019-02-27 DIAGNOSIS — N18.4 STAGE 4 CHRONIC KIDNEY DISEASE (HCC): Primary | ICD-10-CM

## 2019-02-27 LAB
ANION GAP SERPL CALCULATED.3IONS-SCNC: 17 MMOL/L (ref 7–19)
BUN BLDV-MCNC: 44 MG/DL (ref 8–23)
CALCIUM SERPL-MCNC: 7.9 MG/DL (ref 8.8–10.2)
CHLORIDE BLD-SCNC: 104 MMOL/L (ref 98–111)
CO2: 20 MMOL/L (ref 22–29)
CREAT SERPL-MCNC: 4.2 MG/DL (ref 0.5–0.9)
GFR NON-AFRICAN AMERICAN: 10
GLUCOSE BLD-MCNC: 96 MG/DL (ref 74–109)
POTASSIUM SERPL-SCNC: 3.6 MMOL/L (ref 3.5–4.9)
SODIUM BLD-SCNC: 141 MMOL/L (ref 136–145)

## 2019-02-27 PROCEDURE — 3700000001 HC ADD 15 MINUTES (ANESTHESIA): Performed by: SURGERY

## 2019-02-27 PROCEDURE — C2628 CATHETER, OCCLUSION: HCPCS | Performed by: SURGERY

## 2019-02-27 PROCEDURE — 3700000000 HC ANESTHESIA ATTENDED CARE: Performed by: SURGERY

## 2019-02-27 PROCEDURE — 7100000000 HC PACU RECOVERY - FIRST 15 MIN: Performed by: SURGERY

## 2019-02-27 PROCEDURE — 6370000000 HC RX 637 (ALT 250 FOR IP): Performed by: ANESTHESIOLOGY

## 2019-02-27 PROCEDURE — 6360000002 HC RX W HCPCS: Performed by: NURSE ANESTHETIST, CERTIFIED REGISTERED

## 2019-02-27 PROCEDURE — 3600000013 HC SURGERY LEVEL 3 ADDTL 15MIN: Performed by: SURGERY

## 2019-02-27 PROCEDURE — 36415 COLL VENOUS BLD VENIPUNCTURE: CPT

## 2019-02-27 PROCEDURE — 49324 LAP INSERT TUNNEL IP CATH: CPT | Performed by: SURGERY

## 2019-02-27 PROCEDURE — 2500000003 HC RX 250 WO HCPCS: Performed by: SURGERY

## 2019-02-27 PROCEDURE — C1750 CATH, HEMODIALYSIS,LONG-TERM: HCPCS | Performed by: SURGERY

## 2019-02-27 PROCEDURE — 2580000003 HC RX 258: Performed by: SURGERY

## 2019-02-27 PROCEDURE — 2500000003 HC RX 250 WO HCPCS: Performed by: NURSE ANESTHETIST, CERTIFIED REGISTERED

## 2019-02-27 PROCEDURE — 80048 BASIC METABOLIC PNL TOTAL CA: CPT

## 2019-02-27 PROCEDURE — 7100000010 HC PHASE II RECOVERY - FIRST 15 MIN: Performed by: SURGERY

## 2019-02-27 PROCEDURE — 3600000003 HC SURGERY LEVEL 3 BASE: Performed by: SURGERY

## 2019-02-27 PROCEDURE — 7100000001 HC PACU RECOVERY - ADDTL 15 MIN: Performed by: SURGERY

## 2019-02-27 PROCEDURE — 2709999900 HC NON-CHARGEABLE SUPPLY: Performed by: SURGERY

## 2019-02-27 PROCEDURE — 6360000002 HC RX W HCPCS: Performed by: SURGERY

## 2019-02-27 PROCEDURE — 7100000011 HC PHASE II RECOVERY - ADDTL 15 MIN: Performed by: SURGERY

## 2019-02-27 RX ORDER — MORPHINE SULFATE 4 MG/ML
4 INJECTION, SOLUTION INTRAMUSCULAR; INTRAVENOUS
Status: DISCONTINUED | OUTPATIENT
Start: 2019-02-27 | End: 2019-02-27 | Stop reason: HOSPADM

## 2019-02-27 RX ORDER — SODIUM CHLORIDE 450 MG/100ML
INJECTION, SOLUTION INTRAVENOUS CONTINUOUS
Status: DISCONTINUED | OUTPATIENT
Start: 2019-02-27 | End: 2019-02-27 | Stop reason: HOSPADM

## 2019-02-27 RX ORDER — LABETALOL HYDROCHLORIDE 5 MG/ML
5 INJECTION, SOLUTION INTRAVENOUS EVERY 10 MIN PRN
Status: DISCONTINUED | OUTPATIENT
Start: 2019-02-27 | End: 2019-02-27 | Stop reason: HOSPADM

## 2019-02-27 RX ORDER — MORPHINE SULFATE 4 MG/ML
4 INJECTION, SOLUTION INTRAMUSCULAR; INTRAVENOUS
Status: CANCELLED | OUTPATIENT
Start: 2019-02-27

## 2019-02-27 RX ORDER — FENTANYL CITRATE 50 UG/ML
INJECTION, SOLUTION INTRAMUSCULAR; INTRAVENOUS PRN
Status: DISCONTINUED | OUTPATIENT
Start: 2019-02-27 | End: 2019-02-27 | Stop reason: SDUPTHER

## 2019-02-27 RX ORDER — SODIUM CHLORIDE 0.9 % (FLUSH) 0.9 %
10 SYRINGE (ML) INJECTION EVERY 12 HOURS SCHEDULED
Status: DISCONTINUED | OUTPATIENT
Start: 2019-02-27 | End: 2019-02-27 | Stop reason: HOSPADM

## 2019-02-27 RX ORDER — SODIUM CHLORIDE 0.9 % (FLUSH) 0.9 %
10 SYRINGE (ML) INJECTION PRN
Status: DISCONTINUED | OUTPATIENT
Start: 2019-02-27 | End: 2019-02-27 | Stop reason: HOSPADM

## 2019-02-27 RX ORDER — HYDROCODONE BITARTRATE AND ACETAMINOPHEN 5; 325 MG/1; MG/1
1 TABLET ORAL EVERY 4 HOURS PRN
Qty: 30 TABLET | Refills: 0 | Status: SHIPPED | OUTPATIENT
Start: 2019-02-27 | End: 2019-03-06

## 2019-02-27 RX ORDER — ROCURONIUM BROMIDE 10 MG/ML
INJECTION, SOLUTION INTRAVENOUS PRN
Status: DISCONTINUED | OUTPATIENT
Start: 2019-02-27 | End: 2019-02-27 | Stop reason: SDUPTHER

## 2019-02-27 RX ORDER — LIDOCAINE HYDROCHLORIDE 10 MG/ML
1 INJECTION, SOLUTION EPIDURAL; INFILTRATION; INTRACAUDAL; PERINEURAL
Status: DISCONTINUED | OUTPATIENT
Start: 2019-02-27 | End: 2019-02-27 | Stop reason: HOSPADM

## 2019-02-27 RX ORDER — FENTANYL CITRATE 50 UG/ML
50 INJECTION, SOLUTION INTRAMUSCULAR; INTRAVENOUS
Status: DISCONTINUED | OUTPATIENT
Start: 2019-02-27 | End: 2019-02-27 | Stop reason: HOSPADM

## 2019-02-27 RX ORDER — BUPIVACAINE HYDROCHLORIDE 2.5 MG/ML
INJECTION, SOLUTION INFILTRATION; PERINEURAL PRN
Status: DISCONTINUED | OUTPATIENT
Start: 2019-02-27 | End: 2019-02-27 | Stop reason: ALTCHOICE

## 2019-02-27 RX ORDER — SODIUM CHLORIDE 0.9 % (FLUSH) 0.9 %
10 SYRINGE (ML) INJECTION PRN
Status: CANCELLED | OUTPATIENT
Start: 2019-02-27

## 2019-02-27 RX ORDER — MORPHINE SULFATE 4 MG/ML
2 INJECTION, SOLUTION INTRAMUSCULAR; INTRAVENOUS
Status: CANCELLED | OUTPATIENT
Start: 2019-02-27

## 2019-02-27 RX ORDER — HYDROCODONE BITARTRATE AND ACETAMINOPHEN 5; 325 MG/1; MG/1
1 TABLET ORAL EVERY 4 HOURS PRN
Status: CANCELLED | OUTPATIENT
Start: 2019-02-27

## 2019-02-27 RX ORDER — HYDRALAZINE HYDROCHLORIDE 20 MG/ML
5 INJECTION INTRAMUSCULAR; INTRAVENOUS EVERY 10 MIN PRN
Status: DISCONTINUED | OUTPATIENT
Start: 2019-02-27 | End: 2019-02-27 | Stop reason: HOSPADM

## 2019-02-27 RX ORDER — ONDANSETRON 2 MG/ML
4 INJECTION INTRAMUSCULAR; INTRAVENOUS EVERY 6 HOURS PRN
Status: CANCELLED | OUTPATIENT
Start: 2019-02-27

## 2019-02-27 RX ORDER — MEPERIDINE HYDROCHLORIDE 50 MG/ML
12.5 INJECTION INTRAMUSCULAR; INTRAVENOUS; SUBCUTANEOUS EVERY 5 MIN PRN
Status: DISCONTINUED | OUTPATIENT
Start: 2019-02-27 | End: 2019-02-27 | Stop reason: HOSPADM

## 2019-02-27 RX ORDER — SODIUM CHLORIDE 0.9 % (FLUSH) 0.9 %
10 SYRINGE (ML) INJECTION EVERY 12 HOURS SCHEDULED
Status: CANCELLED | OUTPATIENT
Start: 2019-02-27

## 2019-02-27 RX ORDER — SODIUM CHLORIDE, SODIUM LACTATE, POTASSIUM CHLORIDE, CALCIUM CHLORIDE 600; 310; 30; 20 MG/100ML; MG/100ML; MG/100ML; MG/100ML
INJECTION, SOLUTION INTRAVENOUS CONTINUOUS
Status: DISCONTINUED | OUTPATIENT
Start: 2019-02-27 | End: 2019-02-27 | Stop reason: HOSPADM

## 2019-02-27 RX ORDER — SCOLOPAMINE TRANSDERMAL SYSTEM 1 MG/1
1 PATCH, EXTENDED RELEASE TRANSDERMAL ONCE
Status: DISCONTINUED | OUTPATIENT
Start: 2019-02-27 | End: 2019-02-27 | Stop reason: HOSPADM

## 2019-02-27 RX ORDER — LIDOCAINE HYDROCHLORIDE 10 MG/ML
INJECTION, SOLUTION EPIDURAL; INFILTRATION; INTRACAUDAL; PERINEURAL PRN
Status: DISCONTINUED | OUTPATIENT
Start: 2019-02-27 | End: 2019-02-27 | Stop reason: SDUPTHER

## 2019-02-27 RX ORDER — DEXAMETHASONE SODIUM PHOSPHATE 10 MG/ML
INJECTION INTRAMUSCULAR; INTRAVENOUS PRN
Status: DISCONTINUED | OUTPATIENT
Start: 2019-02-27 | End: 2019-02-27 | Stop reason: SDUPTHER

## 2019-02-27 RX ORDER — PROMETHAZINE HYDROCHLORIDE 25 MG/ML
6.25 INJECTION, SOLUTION INTRAMUSCULAR; INTRAVENOUS
Status: DISCONTINUED | OUTPATIENT
Start: 2019-02-27 | End: 2019-02-27 | Stop reason: HOSPADM

## 2019-02-27 RX ORDER — MORPHINE SULFATE 2 MG/ML
2 INJECTION, SOLUTION INTRAMUSCULAR; INTRAVENOUS EVERY 5 MIN PRN
Status: DISCONTINUED | OUTPATIENT
Start: 2019-02-27 | End: 2019-02-27 | Stop reason: HOSPADM

## 2019-02-27 RX ORDER — METOCLOPRAMIDE HYDROCHLORIDE 5 MG/ML
10 INJECTION INTRAMUSCULAR; INTRAVENOUS
Status: DISCONTINUED | OUTPATIENT
Start: 2019-02-27 | End: 2019-02-27 | Stop reason: HOSPADM

## 2019-02-27 RX ORDER — MORPHINE SULFATE 2 MG/ML
4 INJECTION, SOLUTION INTRAMUSCULAR; INTRAVENOUS EVERY 5 MIN PRN
Status: DISCONTINUED | OUTPATIENT
Start: 2019-02-27 | End: 2019-02-27 | Stop reason: HOSPADM

## 2019-02-27 RX ORDER — ONDANSETRON 2 MG/ML
INJECTION INTRAMUSCULAR; INTRAVENOUS PRN
Status: DISCONTINUED | OUTPATIENT
Start: 2019-02-27 | End: 2019-02-27 | Stop reason: SDUPTHER

## 2019-02-27 RX ORDER — DIPHENHYDRAMINE HYDROCHLORIDE 50 MG/ML
12.5 INJECTION INTRAMUSCULAR; INTRAVENOUS
Status: DISCONTINUED | OUTPATIENT
Start: 2019-02-27 | End: 2019-02-27 | Stop reason: HOSPADM

## 2019-02-27 RX ORDER — PROPOFOL 10 MG/ML
INJECTION, EMULSION INTRAVENOUS PRN
Status: DISCONTINUED | OUTPATIENT
Start: 2019-02-27 | End: 2019-02-27 | Stop reason: SDUPTHER

## 2019-02-27 RX ORDER — EPHEDRINE SULFATE 50 MG/ML
INJECTION, SOLUTION INTRAVENOUS PRN
Status: DISCONTINUED | OUTPATIENT
Start: 2019-02-27 | End: 2019-02-27 | Stop reason: SDUPTHER

## 2019-02-27 RX ORDER — MIDAZOLAM HYDROCHLORIDE 1 MG/ML
2 INJECTION INTRAMUSCULAR; INTRAVENOUS
Status: DISCONTINUED | OUTPATIENT
Start: 2019-02-27 | End: 2019-02-27 | Stop reason: HOSPADM

## 2019-02-27 RX ORDER — MIDAZOLAM HYDROCHLORIDE 1 MG/ML
INJECTION INTRAMUSCULAR; INTRAVENOUS PRN
Status: DISCONTINUED | OUTPATIENT
Start: 2019-02-27 | End: 2019-02-27 | Stop reason: SDUPTHER

## 2019-02-27 RX ORDER — HYDROCODONE BITARTRATE AND ACETAMINOPHEN 5; 325 MG/1; MG/1
2 TABLET ORAL EVERY 4 HOURS PRN
Status: CANCELLED | OUTPATIENT
Start: 2019-02-27

## 2019-02-27 RX ADMIN — WATER 1 G: 1 INJECTION INTRAMUSCULAR; INTRAVENOUS; SUBCUTANEOUS at 08:32

## 2019-02-27 RX ADMIN — MIDAZOLAM 1 MG: 1 INJECTION INTRAMUSCULAR; INTRAVENOUS at 08:16

## 2019-02-27 RX ADMIN — ONDANSETRON HYDROCHLORIDE 4 MG: 2 INJECTION, SOLUTION INTRAMUSCULAR; INTRAVENOUS at 09:18

## 2019-02-27 RX ADMIN — ROCURONIUM BROMIDE 30 MG: 10 INJECTION INTRAVENOUS at 08:22

## 2019-02-27 RX ADMIN — EPHEDRINE SULFATE 10 MG: 50 INJECTION, SOLUTION INTRAMUSCULAR; INTRAVENOUS; SUBCUTANEOUS at 08:39

## 2019-02-27 RX ADMIN — DEXAMETHASONE SODIUM PHOSPHATE 10 MG: 10 INJECTION INTRAMUSCULAR; INTRAVENOUS at 08:33

## 2019-02-27 RX ADMIN — LIDOCAINE HYDROCHLORIDE 50 MG: 10 INJECTION, SOLUTION EPIDURAL; INFILTRATION; INTRACAUDAL; PERINEURAL at 08:22

## 2019-02-27 RX ADMIN — SUGAMMADEX 150 MG: 100 INJECTION, SOLUTION INTRAVENOUS at 09:17

## 2019-02-27 RX ADMIN — FENTANYL CITRATE 50 MCG: 50 INJECTION INTRAMUSCULAR; INTRAVENOUS at 08:21

## 2019-02-27 RX ADMIN — SODIUM CHLORIDE: 4.5 INJECTION, SOLUTION INTRAVENOUS at 07:08

## 2019-02-27 RX ADMIN — PROPOFOL 100 MG: 10 INJECTION, EMULSION INTRAVENOUS at 08:22

## 2019-02-27 RX ADMIN — EPHEDRINE SULFATE 10 MG: 50 INJECTION, SOLUTION INTRAMUSCULAR; INTRAVENOUS; SUBCUTANEOUS at 09:19

## 2019-02-27 ASSESSMENT — ENCOUNTER SYMPTOMS
SHORTNESS OF BREATH: 0
BACK PAIN: 0
ABDOMINAL DISTENTION: 0
SORE THROAT: 0
COUGH: 0
WHEEZING: 0
SHORTNESS OF BREATH: 0
DIARRHEA: 0
EYE REDNESS: 0
ABDOMINAL PAIN: 0
RHINORRHEA: 0
EYE PAIN: 0
CONSTIPATION: 0

## 2019-02-27 ASSESSMENT — LIFESTYLE VARIABLES: SMOKING_STATUS: 0

## 2019-02-27 ASSESSMENT — PAIN SCALES - GENERAL: PAINLEVEL_OUTOF10: 0

## 2019-02-27 ASSESSMENT — PAIN - FUNCTIONAL ASSESSMENT: PAIN_FUNCTIONAL_ASSESSMENT: 0-10

## 2019-03-11 ENCOUNTER — OFFICE VISIT (OUTPATIENT)
Dept: SURGERY | Age: 79
End: 2019-03-11

## 2019-03-11 VITALS
HEIGHT: 62 IN | WEIGHT: 139.2 LBS | TEMPERATURE: 97.1 F | BODY MASS INDEX: 25.62 KG/M2 | DIASTOLIC BLOOD PRESSURE: 80 MMHG | SYSTOLIC BLOOD PRESSURE: 134 MMHG

## 2019-03-11 DIAGNOSIS — N18.4 STAGE 4 CHRONIC KIDNEY DISEASE (HCC): Primary | ICD-10-CM

## 2019-03-11 LAB — HEPATITIS B SURFACE ANTIGEN CONFIRMATION: NORMAL

## 2019-03-11 PROCEDURE — 99024 POSTOP FOLLOW-UP VISIT: CPT | Performed by: PHYSICIAN ASSISTANT

## 2019-03-11 RX ORDER — DOXYCYCLINE HYCLATE 50 MG/1
50 CAPSULE ORAL 2 TIMES DAILY
COMMUNITY
End: 2019-03-28 | Stop reason: ALTCHOICE

## 2019-03-25 ENCOUNTER — APPOINTMENT (OUTPATIENT)
Dept: ONCOLOGY | Facility: HOSPITAL | Age: 79
End: 2019-03-25

## 2019-03-28 ENCOUNTER — OFFICE VISIT (OUTPATIENT)
Dept: FAMILY MEDICINE CLINIC | Age: 79
End: 2019-03-28
Payer: MEDICARE

## 2019-03-28 VITALS
TEMPERATURE: 97.6 F | SYSTOLIC BLOOD PRESSURE: 148 MMHG | DIASTOLIC BLOOD PRESSURE: 82 MMHG | BODY MASS INDEX: 24.11 KG/M2 | OXYGEN SATURATION: 98 % | HEART RATE: 78 BPM | WEIGHT: 131.8 LBS

## 2019-03-28 DIAGNOSIS — I10 ESSENTIAL HYPERTENSION: ICD-10-CM

## 2019-03-28 DIAGNOSIS — E53.8 B12 DEFICIENCY: ICD-10-CM

## 2019-03-28 DIAGNOSIS — D63.1 ANEMIA DUE TO STAGE 4 CHRONIC KIDNEY DISEASE (HCC): ICD-10-CM

## 2019-03-28 DIAGNOSIS — N18.4 ANEMIA DUE TO STAGE 4 CHRONIC KIDNEY DISEASE (HCC): ICD-10-CM

## 2019-03-28 DIAGNOSIS — N18.4 STAGE 4 CHRONIC KIDNEY DISEASE (HCC): ICD-10-CM

## 2019-03-28 DIAGNOSIS — E03.9 ACQUIRED HYPOTHYROIDISM: ICD-10-CM

## 2019-03-28 DIAGNOSIS — E03.9 ACQUIRED HYPOTHYROIDISM: Primary | ICD-10-CM

## 2019-03-28 DIAGNOSIS — I49.9 IRREGULAR HEART BEAT: ICD-10-CM

## 2019-03-28 PROBLEM — E87.1 HYPONATREMIA: Status: RESOLVED | Noted: 2018-06-01 | Resolved: 2019-03-28

## 2019-03-28 PROBLEM — E87.5 HYPERKALEMIA: Status: RESOLVED | Noted: 2018-09-13 | Resolved: 2019-03-28

## 2019-03-28 PROBLEM — N05.2 MEMBRANOUS GLOMERULONEPHRITIS: Status: RESOLVED | Noted: 2018-06-01 | Resolved: 2019-03-28

## 2019-03-28 PROBLEM — N17.9 ACUTE KIDNEY INJURY (NONTRAUMATIC) (HCC): Status: RESOLVED | Noted: 2018-09-14 | Resolved: 2019-03-28

## 2019-03-28 PROBLEM — N05.5: Status: RESOLVED | Noted: 2018-06-01 | Resolved: 2019-03-28

## 2019-03-28 LAB
T4 FREE: 1.3 NG/DL (ref 0.9–1.7)
TSH SERPL DL<=0.05 MIU/L-ACNC: 5.65 UIU/ML (ref 0.27–4.2)
VITAMIN B-12: 1789 PG/ML (ref 211–946)

## 2019-03-28 PROCEDURE — 99214 OFFICE O/P EST MOD 30 MIN: CPT | Performed by: CLINICAL NURSE SPECIALIST

## 2019-03-28 PROCEDURE — 1123F ACP DISCUSS/DSCN MKR DOCD: CPT | Performed by: CLINICAL NURSE SPECIALIST

## 2019-03-28 PROCEDURE — 1090F PRES/ABSN URINE INCON ASSESS: CPT | Performed by: CLINICAL NURSE SPECIALIST

## 2019-03-28 PROCEDURE — 1036F TOBACCO NON-USER: CPT | Performed by: CLINICAL NURSE SPECIALIST

## 2019-03-28 PROCEDURE — G8399 PT W/DXA RESULTS DOCUMENT: HCPCS | Performed by: CLINICAL NURSE SPECIALIST

## 2019-03-28 PROCEDURE — G8484 FLU IMMUNIZE NO ADMIN: HCPCS | Performed by: CLINICAL NURSE SPECIALIST

## 2019-03-28 PROCEDURE — 4040F PNEUMOC VAC/ADMIN/RCVD: CPT | Performed by: CLINICAL NURSE SPECIALIST

## 2019-03-28 PROCEDURE — G8427 DOCREV CUR MEDS BY ELIG CLIN: HCPCS | Performed by: CLINICAL NURSE SPECIALIST

## 2019-03-28 PROCEDURE — G8420 CALC BMI NORM PARAMETERS: HCPCS | Performed by: CLINICAL NURSE SPECIALIST

## 2019-03-28 RX ORDER — DOCUSATE SODIUM 100 MG/1
100 CAPSULE, LIQUID FILLED ORAL 2 TIMES DAILY
COMMUNITY
End: 2019-07-05 | Stop reason: ALTCHOICE

## 2019-03-28 RX ORDER — CARVEDILOL 3.12 MG/1
3.12 TABLET ORAL 2 TIMES DAILY WITH MEALS
COMMUNITY

## 2019-03-28 ASSESSMENT — PATIENT HEALTH QUESTIONNAIRE - PHQ9
SUM OF ALL RESPONSES TO PHQ QUESTIONS 1-9: 0
1. LITTLE INTEREST OR PLEASURE IN DOING THINGS: 0
2. FEELING DOWN, DEPRESSED OR HOPELESS: 0
SUM OF ALL RESPONSES TO PHQ QUESTIONS 1-9: 0
SUM OF ALL RESPONSES TO PHQ9 QUESTIONS 1 & 2: 0

## 2019-03-28 ASSESSMENT — ENCOUNTER SYMPTOMS
DIARRHEA: 0
SHORTNESS OF BREATH: 0
COLOR CHANGE: 0
COUGH: 0
EYE REDNESS: 0
TROUBLE SWALLOWING: 0
CHEST TIGHTNESS: 0
CONSTIPATION: 0
FACIAL SWELLING: 0
WHEEZING: 0
SINUS PRESSURE: 0
EYE PAIN: 0
BACK PAIN: 0
EYE DISCHARGE: 0
SORE THROAT: 0
ABDOMINAL PAIN: 0

## 2019-04-18 RX ORDER — LEVOTHYROXINE SODIUM 0.05 MG/1
50 TABLET ORAL DAILY
Qty: 90 TABLET | Refills: 1 | Status: SHIPPED | OUTPATIENT
Start: 2019-04-18 | End: 2019-10-08 | Stop reason: SDUPTHER

## 2019-04-18 NOTE — TELEPHONE ENCOUNTER
Tres Chavez called requesting a refill of the below medication which has been pended for you:     Requested Prescriptions     Pending Prescriptions Disp Refills    levothyroxine (SYNTHROID) 50 MCG tablet 90 tablet 1     Sig: Take 1 tablet by mouth Daily Indications: Underactive Thyroid       Last Appointment Date: 3/28/2019  Next Appointment Date: 6/28/2019    Allergies   Allergen Reactions    Metolazone Other (See Comments)     Causes fainting.     Statins Other (See Comments)     Muscle aches

## 2019-04-25 ENCOUNTER — OFFICE VISIT (OUTPATIENT)
Dept: CARDIOLOGY | Age: 79
End: 2019-04-25
Payer: MEDICARE

## 2019-04-25 VITALS
SYSTOLIC BLOOD PRESSURE: 102 MMHG | BODY MASS INDEX: 25.4 KG/M2 | HEIGHT: 62 IN | WEIGHT: 138 LBS | DIASTOLIC BLOOD PRESSURE: 70 MMHG | HEART RATE: 77 BPM

## 2019-04-25 DIAGNOSIS — N18.6 ESRD ON PERITONEAL DIALYSIS (HCC): ICD-10-CM

## 2019-04-25 DIAGNOSIS — I10 ESSENTIAL HYPERTENSION: ICD-10-CM

## 2019-04-25 DIAGNOSIS — I49.3 FREQUENT PVCS: Primary | ICD-10-CM

## 2019-04-25 DIAGNOSIS — R06.02 SHORTNESS OF BREATH: ICD-10-CM

## 2019-04-25 DIAGNOSIS — Z99.2 ESRD ON PERITONEAL DIALYSIS (HCC): ICD-10-CM

## 2019-04-25 PROCEDURE — 4040F PNEUMOC VAC/ADMIN/RCVD: CPT | Performed by: CLINICAL NURSE SPECIALIST

## 2019-04-25 PROCEDURE — 1036F TOBACCO NON-USER: CPT | Performed by: CLINICAL NURSE SPECIALIST

## 2019-04-25 PROCEDURE — 93000 ELECTROCARDIOGRAM COMPLETE: CPT | Performed by: CLINICAL NURSE SPECIALIST

## 2019-04-25 PROCEDURE — G8419 CALC BMI OUT NRM PARAM NOF/U: HCPCS | Performed by: CLINICAL NURSE SPECIALIST

## 2019-04-25 PROCEDURE — G8427 DOCREV CUR MEDS BY ELIG CLIN: HCPCS | Performed by: CLINICAL NURSE SPECIALIST

## 2019-04-25 PROCEDURE — 1123F ACP DISCUSS/DSCN MKR DOCD: CPT | Performed by: CLINICAL NURSE SPECIALIST

## 2019-04-25 PROCEDURE — 1090F PRES/ABSN URINE INCON ASSESS: CPT | Performed by: CLINICAL NURSE SPECIALIST

## 2019-04-25 PROCEDURE — G8399 PT W/DXA RESULTS DOCUMENT: HCPCS | Performed by: CLINICAL NURSE SPECIALIST

## 2019-04-25 PROCEDURE — 99203 OFFICE O/P NEW LOW 30 MIN: CPT | Performed by: CLINICAL NURSE SPECIALIST

## 2019-04-25 RX ORDER — SEVELAMER CARBONATE 800 MG/1
1 TABLET, FILM COATED ORAL
COMMUNITY
End: 2019-07-05 | Stop reason: ALTCHOICE

## 2019-04-25 RX ORDER — CIPROFLOXACIN 500 MG/1
500 TABLET, FILM COATED ORAL 2 TIMES DAILY
COMMUNITY
End: 2019-06-18 | Stop reason: ALTCHOICE

## 2019-04-25 ASSESSMENT — ENCOUNTER SYMPTOMS
CHEST TIGHTNESS: 0
SHORTNESS OF BREATH: 1
COUGH: 0
ABDOMINAL PAIN: 0
EYE REDNESS: 0
NAUSEA: 0
WHEEZING: 0
VOMITING: 0
FACIAL SWELLING: 0

## 2019-04-25 NOTE — PROGRESS NOTES
[facility] [place]  [id number]  Phone: [phone]  Fax: [phone]    OFFICE VISIT:  4/25/2019    [address]: 1940    Reason For Visit:  Simeon Kraus is a 77 y.o. female who is here for New Patient and Palpitations      HPI  Patient is referred here with complaints of an irregular heartbeat noted on exam by her PCP office. She is a history of hypothyroidism, CKD, and hypertension. Patient states she has no awareness of palpitations or irregular heart beats. There is no complaints of syncope, dizziness, or lightheadedness. She denies chest pain with exertion. She has some mild dyspnea which is unchanged. She continues to live at home and is able to keep her own house. She is on peritoneal dialysis which she manages at home. There is no family history of heart disease. She denies diabetes or smoking     MEHREEN Seay is PCP. Roc Costa has the following history as recorded in [facility]:    Patient Active Problem List    Diagnosis Date Noted    ESRD on peritoneal dialysis ([address] 04/25/2019    Irregular heart beat 03/28/2019    Chronic kidney disease     Anemia     Mitral valve prolapse 06/01/2018    Hypertension 06/01/2018    Acid reflux 06/01/2018    Hypothyroidism 01/04/2016    B12 deficiency 01/04/2016     Past Medical History:   Diagnosis Date    Acid reflux     Anemia     B12 deficiency 1/4/2016    Chronic kidney disease     sees Dr. 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 Michelle Verdugo  1/22/2010    DR. SANCHEZ    ENDOSCOPY, COLON, DIAGNOSTIC      EYE SURGERY      HERNIA REPAIR      [facility]  LAPAROSCOPY INSERTION PERITONEAL CATHETER N/A 2/27/2019    LAPAROSCOPIC PERITONEAL DIALYSIS CATHETER PLACEMENT AND POSSIBLE OMENTOPEXY performed by Malina Queen MD at [address] GASTROINTESTINAL ENDOSCOPY  2/22/2006    DR. Marshall     Family History   Problem Relation Age of Onset   Murray Flax Breast Cancer Mother 80    Hypertension Mother     Heart Disease Mother         pacemaker    Colon Polyps Mother     High Blood Pressure Mother     Hypertension Sister     High Blood Pressure Sister     Cancer Father         liver    Heart Disease Paternal Grandfather     Breast Cancer Other         maternal great aunt    High Blood Pressure Daughter     Heart Disease Daughter     Diabetes Daughter     Colon Cancer Neg Hx      Social History     Tobacco Use    Smoking status: Never Smoker    Smokeless tobacco: Never Used   Substance Use Topics    Alcohol use: No      Current Outpatient Medications   Medication Sig Dispense Refill    ciprofloxacin (CIPRO) 500 MG tablet Take 500 mg by mouth 2 times daily      sevelamer (RENVELA) 800 MG tablet Take 1 tablet by mouth 3 times daily (with meals)      levothyroxine (SYNTHROID) 50 MCG tablet Take 1 tablet by mouth Daily Indications: Underactive Thyroid 90 tablet 1    carvedilol (COREG) 3.125 MG tablet Take 3.125 mg by mouth 2 times daily (with meals)      Ferric Citrate (AURYXIA) 1  MG(Fe) TABS Take 1 tablet by mouth 3 times daily (with meals)      B Complex-C (SUPER B COMPLEX PO) Take 1 tablet by mouth daily      docusate sodium (COLACE) 100 MG capsule Take 100 mg by mouth 2 times daily      potassium chloride (KLOR-CON) 20 MEQ packet Take 20 mEq by mouth daily       aspirin 81 MG tablet Take 81 mg by mouth daily      cloNIDine (CATAPRES) 0.2 MG tablet Take 1 tablet by mouth 3 times daily 90 tablet 0    amLODIPine (NORVASC) 10 MG tablet Take 1 tablet by mouth daily 30 tablet 0    bumetanide (BUMEX) 2 MG tablet Take 4 mg by mouth 2 times daily       lisinopril (PRINIVIL;ZESTRIL) 20 MG tablet Take 1 tablet by mouth daily (Patient taking differently: Take 40 mg by mouth 2 times daily ) 30 tablet 0    SODIUM BICARBONATE PO Take 1,300 mg by mouth 2 times daily Indications: Progressive Kidney Disease       Multiple Vitamins-Minerals (PRESERVISION AREDS 2 PO) Take 1 tablet by mouth 2 times daily       Omega-3 Fatty Acids (FISH OIL) 1000 MG CPDR Take 1,000 mg by mouth 4 times daily       Cyanocobalamin (VITAMIN B-12 IJ) Inject as directed every 30 days        No current facility-administered medications for this visit. Allergies: Metolazone and Statins    Review of Systems  Review of Systems   Constitutional: Negative for activity change, diaphoresis, fatigue, fever and unexpected weight change. HENT: Negative for facial swelling and nosebleeds. Eyes: Negative for redness and visual disturbance. Respiratory: Positive for shortness of breath (mild). Negative for cough, chest tightness and wheezing. Cardiovascular: Negative for chest pain, palpitations and leg swelling. Gastrointestinal: Negative for abdominal pain, nausea and vomiting. Endocrine: Negative for cold intolerance and heat intolerance. Genitourinary: Negative for dysuria and hematuria. Musculoskeletal: Negative for arthralgias and myalgias. Skin: Negative for pallor and rash. Neurological: Negative for dizziness, seizures, syncope, weakness and light-headedness. Hematological: Does not bruise/bleed easily. Psychiatric/Behavioral: Negative for agitation. The patient is not nervous/anxious. Objective  Vital Signs - /70   Pulse 77   Ht 5' 2\" (1.575 m)   Wt 138 lb (62.6 kg)   LMP  (LMP Unknown)   BMI 25.24 kg/m²   Physical Exam   Constitutional: She is oriented to person, place, and time. She appears well-developed and well-nourished. HENT:   Head: Normocephalic and atraumatic.    Right Ear: Hearing and external ear normal.   Left Ear: Hearing and external ear normal.   Nose: Nose normal.   Eyes: Pupils are equal, round, and reactive to light. Right eye exhibits no discharge. Left eye exhibits no discharge. Neck: No JVD present. No tracheal deviation present. No thyromegaly present. Cardiovascular: Normal rate, normal heart sounds and intact distal pulses. Exam reveals no gallop and no friction rub. No murmur heard. No carotid bruit  Irregular rhythm   Pulmonary/Chest: Effort normal and breath sounds normal. No respiratory distress. She has no wheezes. She has no rales. Abdominal: Soft. There is no tenderness. Musculoskeletal: She exhibits no edema. Normal gait and station   Neurological: She is alert and oriented to person, place, and time. No cranial nerve deficit. Skin: Skin is warm and dry. No rash noted. Psychiatric: She has a normal mood and affect. Her behavior is normal. Judgment normal.   Nursing note and vitals reviewed. Data:  EKG shows normal sinus rhythm with frequent PVC    Lab Results   Component Value Date    TSH 5.650 (H) 03/28/2019     Lab Results   Component Value Date     02/27/2019    K 3.6 02/27/2019    K 5.4 09/14/2018     02/27/2019    CO2 20 02/27/2019    BUN 44 02/27/2019    CREATININE 4.2 02/27/2019    GLUCOSE 96 02/27/2019    CALCIUM 7.9 02/27/2019      Assessment:     Diagnosis Orders   1. Frequent PVCs  Holter Monitor 24 Hour    ECHO Pharmacological Stress Test    ECHO Complete 2D W Doppler W Color   2. Essential hypertension  EKG 12 lead    ECHO Complete 2D W Doppler W Color   3. Shortness of breath  ECHO Pharmacological Stress Test    ECHO Complete 2D W Doppler W Color   4. ESRD on peritoneal dialysis ([address]       Frequent PVCs-noted per EKG. Patient is asymptomatic. Plan will be to check a 24 hour Zio Patch monitor to assess PVC burden. We will also check a 2-D echocardiogram to rule out structural issues and a dobutamine stress echo to rule out ischemic issues.   Most recent labs reviewed including TSH and BMP    Plan    Orders Placed This Encounter   Procedures    Holter Monitor 24 Hour     Standing Status:   Future     Standing Expiration Date:   4/24/2020     Order Specific Question:   Reason for Exam?     Answer:   Irregular heart rate    EKG 12 lead     Order Specific Question:   Reason for Exam?     Answer:   Irregular heart rate    ECHO Pharmacological Stress Test     Standing Status:   Future     Standing Expiration Date:   4/24/2020     Order Specific Question:   Reason for exam:     Answer:   PVC    ECHO Complete 2D W Doppler W Color     Standing Status:   Future     Standing Expiration Date:   4/25/2020     Order Specific Question:   Reason for exam:     Answer:   PVC     Return in about 1 month (around 5/25/2019) for Dr. Dorie Harrison. Echocardiogram  Dobutamine Stress Echo  Zio Patch 24 hr monitor    Call with any questionsor concerns  Follow up with MEHREEN Whitmore for non cardiac problems  Report any new problems  Cardiovascular Fitness-Exercise as tolerated. Strive for 15 minutes of exercise most days of the week. Cardiac / HealthyDiet  Continue current medications as directed  Continue plan of treatment  It is always recommended that you bring your medicationsbottles with you to each visit - this is for your safety!        MEHREEN Calabrese

## 2019-04-25 NOTE — PATIENT INSTRUCTIONS
[place] at the [facility] and [address] [facility] located on the first floor of [place] [address] through [facility] entrance and turn immediately to your left. Patient's contact number:  [phone] (home)     Date/Time:     Dobutamine Stress Test    A chemical stress test uses chemical agents injected into the body through the vein. These chemicals make the heart function as if it were under stress. A chemical stress test is used when a traditional stress test (called a cardiac stress test) cannot be done. Testing will take approximately one hour. Dobutamine Stress Echocardiogram Instructions:   No caffeine 24 hours prior to the testing. This includes: coffee, pop/soda, chocolate, cold medications, etc.  Any product that might contain caffeine. Do not eat or drink anything, except water, eight (8) hours before the test.   No alcohol or nicotine twelve (12) hours prior to your test.   Wear comfortable clothing. If you are taking metoprolol, stop morning of this procedure. If you need to change this appointment, please call outpatient scheduling at [phone]. [place] at the [employer] [facility] and [address] [facility] located on the first floor of [place] [address] through [facility] and turn immediately to your left. Date/Time:     Patient's contact number:  [phone] (home)     Echocardiogram -  No prep. Takes approximately 30 min. An echocardiogram uses sound waves to produce images of your heart. This commonly used test allows your doctor to see how your heart is beating and pumping blood. Your doctor can use the images from an echocardiogram to identify various abnormalities in the heart muscle and valves. This test has 2 parts:   Ø You will be asked to disrobe from the waist up and given a gown to wear.  The technologist will then hook up an EKG monitor to you for the entire exam.   Ø You will then have an ultrasound of your heart (echocardiogram) to assess the heart muscle, heart valves and heart function. You may eat and take any medicines before the exam.     If you need to change your appointment, please call outpatient scheduling at [phone]. A cardiac test has been ordered for you. Someone from scheduling will call you to set up date and time. Please answer the phone if you see a phone number with area code [phone] as this may be scheduling.   If you do not hear from them within a week of being seen in the office or test ordered then please call [phone]

## 2019-05-02 ENCOUNTER — TELEPHONE (OUTPATIENT)
Dept: CARDIOLOGY | Age: 79
End: 2019-05-02

## 2019-05-02 NOTE — TELEPHONE ENCOUNTER
Tk report reviewed.   Analysis time one day  Underlying rhythm normal sinus rhythm  Average heart rate 76 bpm  Minimum heart rate 61 bpm  No VT, pauses, heart block, A. fib or SVT  Occasional PVC  Rare PAC  No patient triggers her diary entries  tlm

## 2019-05-03 DIAGNOSIS — I49.3 FREQUENT PVCS: ICD-10-CM

## 2019-05-20 ENCOUNTER — HOSPITAL ENCOUNTER (OUTPATIENT)
Dept: NON INVASIVE DIAGNOSTICS | Age: 79
Discharge: HOME | End: 2019-05-20
Payer: MEDICARE

## 2019-05-20 DIAGNOSIS — I49.3 FREQUENT PVCS: ICD-10-CM

## 2019-05-20 DIAGNOSIS — R06.02 SHORTNESS OF BREATH: ICD-10-CM

## 2019-05-20 DIAGNOSIS — I10 ESSENTIAL HYPERTENSION: ICD-10-CM

## 2019-05-20 LAB
LV EF: 60 %
LVEF MODALITY: NORMAL

## 2019-05-20 PROCEDURE — 93306 TTE W/DOPPLER COMPLETE: CPT

## 2019-05-21 DIAGNOSIS — I51.89 LEFT ATRIAL MASS: Primary | ICD-10-CM

## 2019-05-22 ENCOUNTER — HOSPITAL ENCOUNTER (OUTPATIENT)
Dept: CT IMAGING | Age: 79
Discharge: HOME | End: 2019-05-22
Payer: MEDICARE

## 2019-05-22 ENCOUNTER — OFFICE VISIT (OUTPATIENT)
Dept: CARDIOLOGY | Age: 79
End: 2019-05-22
Payer: MEDICARE

## 2019-05-22 VITALS
WEIGHT: 129.8 LBS | HEIGHT: 62 IN | BODY MASS INDEX: 23.89 KG/M2 | DIASTOLIC BLOOD PRESSURE: 66 MMHG | SYSTOLIC BLOOD PRESSURE: 118 MMHG | HEART RATE: 66 BPM

## 2019-05-22 DIAGNOSIS — I51.89 LEFT ATRIAL MASS: ICD-10-CM

## 2019-05-22 DIAGNOSIS — I10 ESSENTIAL HYPERTENSION: Primary | ICD-10-CM

## 2019-05-22 DIAGNOSIS — R06.09 DYSPNEA ON EXERTION: ICD-10-CM

## 2019-05-22 LAB
GFR NON-AFRICAN AMERICAN: 7
PERFORMED ON: ABNORMAL
POC CREATININE: 6.1 MG/DL (ref 0.3–1.3)
POC SAMPLE TYPE: ABNORMAL

## 2019-05-22 PROCEDURE — 99214 OFFICE O/P EST MOD 30 MIN: CPT | Performed by: NURSE PRACTITIONER

## 2019-05-22 PROCEDURE — 6360000004 HC RX CONTRAST MEDICATION: Performed by: CLINICAL NURSE SPECIALIST

## 2019-05-22 PROCEDURE — 71260 CT THORAX DX C+: CPT

## 2019-05-22 PROCEDURE — 82565 ASSAY OF CREATININE: CPT

## 2019-05-22 RX ADMIN — IOPAMIDOL 60 ML: 755 INJECTION, SOLUTION INTRAVENOUS at 14:57

## 2019-05-22 NOTE — PATIENT INSTRUCTIONS
[place] at the [facility] and [address] [facility] located on the first floor of [place] [address] through hospital main entrance and turn immediately to your left. Patient's contact number:  [phone] (home)     :Date/Time: May 31st  Friday @7:45    Dobutamine Stress Test    A chemical stress test uses chemical agents injected into the body through the vein. These chemicals make the heart function as if it were under stress. A chemical stress test is used when a traditional stress test (called a cardiac stress test) cannot be done. Testing will take approximately one hour. Dobutamine Stress Echocardiogram Instructions:   No caffeine 24 hours prior to the testing. This includes: coffee, pop/soda, chocolate, cold medications, etc.  Any product that might contain caffeine. Do not eat or drink anything, except water, eight (8) hours before the test.   No alcohol or nicotine twelve (12) hours prior to your test.   Wear comfortable clothing. If you are taking metoprolol, stop morning of this procedure. If you need to change this appointment, please call outpatient scheduling at [phone].

## 2019-05-22 NOTE — PROGRESS NOTES
by mouth 3 times daily (Patient taking differently: Take 0.2 mg by mouth 1/2 tablet  2 times a day) 90 tablet 0    amLODIPine (NORVASC) 10 MG tablet Take 1 tablet by mouth daily 30 tablet 0    bumetanide (BUMEX) 2 MG tablet Take 4 mg by mouth 2 times daily       lisinopril (PRINIVIL;ZESTRIL) 20 MG tablet Take 1 tablet by mouth daily (Patient taking differently: Take 40 mg by mouth 2 times daily ) 30 tablet 0    SODIUM BICARBONATE PO Take 1,300 mg by mouth 2 times daily Indications: Progressive Kidney Disease       Multiple Vitamins-Minerals (PRESERVISION AREDS 2 PO) Take 1 tablet by mouth 2 times daily       Omega-3 Fatty Acids (FISH OIL) 1000 MG CPDR Take 1,000 mg by mouth 4 times daily       Cyanocobalamin (VITAMIN B-12 IJ) Inject as directed every 30 days        No current facility-administered medications for this visit. Allergies: Metolazone and Statins  Past Medical History:   Diagnosis Date    Acid reflux     Anemia     B12 deficiency 1/4/2016    Chronic kidney disease     sees Dr. 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 Amirah Leon  1/22/2010    DR. SANCHEZ    ENDOSCOPY, COLON, DIAGNOSTIC      EYE SURGERY      HERNIA REPAIR      [facility]    LAPAROSCOPY INSERTION PERITONEAL CATHETER N/A 2/27/2019    LAPAROSCOPIC PERITONEAL DIALYSIS CATHETER PLACEMENT AND POSSIBLE OMENTOPEXY performed by Favian Velasco MD at [address] [facility] ENDOSCOPY  2/22/2006    DR. Marshall     Family History   Problem Relation Age of Onset    Breast Cancer Mother 80    Hypertension Mother     Heart Disease Mother         pacemaker    Colon Polyps Mother    [facility] High Blood Pressure Mother     Hypertension Sister     High Blood Pressure Sister     Cancer Father         liver    Heart Disease Paternal Grandfather     Breast Cancer Other         maternal great aunt    High Blood Pressure Daughter     Heart Disease Daughter     Diabetes Daughter     Colon Cancer Neg Hx      Social History     Tobacco Use    Smoking status: Never Smoker    Smokeless tobacco: Never Used   Substance Use Topics    Alcohol use: No          Review of Systems:    General:      Complaint / Symptom Yes / No / Description if Yes       Fatigue No   Weight gain N/A   Insomnia N/A       Respiratory:        Complaint / Symptom Yes / No / Description if Yes       Cough No   Horseness N/A       Cardiovascular:    Complaint / Symptom Yes / No / Description if Yes       Chest Pain No   Shortness of Air / Orthopnea Yes occasional    Presyncope / Syncope No   Palpitations No         Objective:    /66 (Site: Right Upper Arm, Position: Sitting, Cuff Size: Medium Adult)   Pulse 66   Ht 5' 2\" (1.575 m)   Wt 129 lb 12.8 oz (58.9 kg)   LMP  (LMP Unknown)   BMI 23.74 kg/m²     GENERAL - well developed and well nourished, conversant  HEENT -  PERRLA, Hearing appears normal  NECK - no thyromegaly, no JVD, trachea is in the midline  CARDIOVASCULAR - PMI is in the mid line clavicular position, Normal S1 and S2 with no systolic murmur. No S3 or S4    PULMONARY - no respiratory distress. No wheezes or rales.  Lungs are clear to ausculation   ABDOMEN  - soft, non tender, no rebound  MUSCULOSKELETAL  - range of motion of the upper and lower extermites appears normal and equal and is without pain   EXTREMITIES - no significant edema   NEUROLOGIC - gait and station are normal  SKIN - turgor is normal  PSYCHIATRIC - normal mood and affect, alert and orientated x 3,      ASSESSMENT:    ALL THE CARDIOLOGY PROBLEMS ARE LISTED ABOVE; HOWEVER, THE FOLLOWING SPECIFIC CARDIAC PROBLEMS / CONDITIONS WERE ADDRESSED AND TREATED DURING THE OFFICE VISIT TODAY:                                                                                            MEDICAL DECISION MAKING             Cardiac Specific Problem / Diagnosis  Discussion and Data Reviewed Diagnostic Procedures Ordered Management Options Selected           1. Hypertension   Well Controlled   Review and summation of old records:    Blood pressure in the office today well controlled at 118/66. No Continue current medications:     Yes           2. Shortness of breath   Shows no change   Please see results of 2-D echo and chest CT above. Will order dobutamine stress test  Yes Continue current medications:    Yes           3. ESRD on Peritoneal Dialysis Stable Followed by Dr. Va Chiang  No Continue current medications: Yes             Orders Placed This Encounter   Procedures    Echo pharmacological stress test     Standing Status:   Future     Standing Expiration Date:   5/22/2020     Order Specific Question:   Reason for exam:     Answer:   shortness of breath     No orders of the defined types were placed in this encounter. Discussed with patient and adult children - daughter and son. Return in about 2 weeks (around 6/5/2019) for results of DSE with Dr Adalberto Bertrand or Jass De La Paz. I greatly appreciate the opportunity to meet Marichuy Evans and your confidence in allowing me to participate in her cardiovascular care.     [facility], APRN - NP  5/22/2019 3:24 PM

## 2019-05-31 ENCOUNTER — HOSPITAL ENCOUNTER (OUTPATIENT)
Dept: NON INVASIVE DIAGNOSTICS | Age: 79
Discharge: HOME | End: 2019-05-31
Payer: MEDICARE

## 2019-05-31 DIAGNOSIS — R06.09 DYSPNEA ON EXERTION: ICD-10-CM

## 2019-06-18 ENCOUNTER — OFFICE VISIT (OUTPATIENT)
Dept: CARDIOLOGY | Age: 79
End: 2019-06-18
Payer: MEDICARE

## 2019-06-18 VITALS
HEART RATE: 68 BPM | SYSTOLIC BLOOD PRESSURE: 100 MMHG | WEIGHT: 130 LBS | HEIGHT: 62 IN | DIASTOLIC BLOOD PRESSURE: 62 MMHG | BODY MASS INDEX: 23.92 KG/M2

## 2019-06-18 DIAGNOSIS — I10 ESSENTIAL HYPERTENSION: ICD-10-CM

## 2019-06-18 DIAGNOSIS — I49.3 PVC (PREMATURE VENTRICULAR CONTRACTION): ICD-10-CM

## 2019-06-18 DIAGNOSIS — Z99.2 ESRD ON PERITONEAL DIALYSIS (HCC): ICD-10-CM

## 2019-06-18 DIAGNOSIS — I35.1 NONRHEUMATIC AORTIC VALVE INSUFFICIENCY: ICD-10-CM

## 2019-06-18 DIAGNOSIS — I51.89 DIASTOLIC DYSFUNCTION: ICD-10-CM

## 2019-06-18 DIAGNOSIS — I25.10 ATHEROSCLEROSIS OF NATIVE CORONARY ARTERY OF NATIVE HEART WITHOUT ANGINA PECTORIS: Primary | ICD-10-CM

## 2019-06-18 DIAGNOSIS — N18.6 ESRD ON PERITONEAL DIALYSIS (HCC): ICD-10-CM

## 2019-06-18 DIAGNOSIS — I34.0 NON-RHEUMATIC MITRAL REGURGITATION: ICD-10-CM

## 2019-06-18 PROCEDURE — G8598 ASA/ANTIPLAT THER USED: HCPCS | Performed by: CLINICAL NURSE SPECIALIST

## 2019-06-18 PROCEDURE — G8420 CALC BMI NORM PARAMETERS: HCPCS | Performed by: CLINICAL NURSE SPECIALIST

## 2019-06-18 PROCEDURE — G8427 DOCREV CUR MEDS BY ELIG CLIN: HCPCS | Performed by: CLINICAL NURSE SPECIALIST

## 2019-06-18 PROCEDURE — 99214 OFFICE O/P EST MOD 30 MIN: CPT | Performed by: CLINICAL NURSE SPECIALIST

## 2019-06-18 PROCEDURE — G8399 PT W/DXA RESULTS DOCUMENT: HCPCS | Performed by: CLINICAL NURSE SPECIALIST

## 2019-06-18 PROCEDURE — 1123F ACP DISCUSS/DSCN MKR DOCD: CPT | Performed by: CLINICAL NURSE SPECIALIST

## 2019-06-18 PROCEDURE — 1036F TOBACCO NON-USER: CPT | Performed by: CLINICAL NURSE SPECIALIST

## 2019-06-18 PROCEDURE — 4040F PNEUMOC VAC/ADMIN/RCVD: CPT | Performed by: CLINICAL NURSE SPECIALIST

## 2019-06-18 PROCEDURE — 1090F PRES/ABSN URINE INCON ASSESS: CPT | Performed by: CLINICAL NURSE SPECIALIST

## 2019-06-18 RX ORDER — AMLODIPINE BESYLATE 5 MG/1
5 TABLET ORAL DAILY
COMMUNITY
End: 2020-04-27 | Stop reason: ALTCHOICE

## 2019-06-18 RX ORDER — LISINOPRIL 20 MG/1
20 TABLET ORAL DAILY
COMMUNITY
End: 2020-04-27 | Stop reason: ALTCHOICE

## 2019-06-18 RX ORDER — CLONIDINE HYDROCHLORIDE 0.2 MG/1
0.1 TABLET ORAL 2 TIMES DAILY
COMMUNITY
End: 2019-07-05 | Stop reason: ALTCHOICE

## 2019-06-18 ASSESSMENT — ENCOUNTER SYMPTOMS
WHEEZING: 0
VOMITING: 0
ABDOMINAL PAIN: 0
COUGH: 0
EYE REDNESS: 0
CHEST TIGHTNESS: 0
NAUSEA: 0
FACIAL SWELLING: 0
SHORTNESS OF BREATH: 0

## 2019-06-18 NOTE — PROGRESS NOTES
[facility], [address], [address]  [address]  Phone: [phone]  Fax: [phone]    OFFICE VISIT:  2019    Rigoberto Overton - : 1940    Reason For Visit:  Aleksey Mccrary is a [de-identified] y.o. female who is here for Hypertension (No cardiac symptoms today.)    HPI  Patient was originally referred to our office on 2019 with irregular heartbeat found on exam by her PCP. She was found to have occasional PVCs. 2D echo showed overall normal heart function, diastolic dysfunction, and mild mitral and aortic regurgitation. There was question about an atrial mass and therefore a CT of the chest was done which showed no mass but did show atheromatous changes of the coronary arteries. She was seen by MEHREEN Sinclair and was supposed to have a dobutamine stress echo. Patient states when she arrived there she was told by Dr. Haim Leo that she did not have to have this done, but may need to have a heart cath and that they would get back to her. She states she has never heard back and she presents here to the office for routine follow-up today. Other history includes peritoneal dialysis. Dr. Omar Mccormick recently placed her on carvedilol which seems to be helping her heart rate she states    Patient denies chest pain, dyspnea, or palpitations     MEHREEN Carrasco is PCP.   Rigoberto Overton has the following history as recorded in [facility]:    Patient Active Problem List    Diagnosis Date Noted    PVC (premature ventricular contraction)     Diastolic dysfunction     Non-rheumatic mitral regurgitation 2019    Nonrheumatic aortic valve insufficiency 2019    ESRD on peritoneal dialysis ([address] 2019    Irregular heart beat 2019    Chronic kidney disease     Anemia     Mitral valve prolapse 2018    Hypertension 2018    Acid reflux 2018    Hypothyroidism 2016    B12 deficiency 2016     Past Medical History:   Diagnosis Date    Acid reflux     Anemia     B12 deficiency 1/4/2016    Chronic kidney disease     sees Dr. 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 Delphine Yoder  1/22/2010    DR. SANCHEZ    ENDOSCOPY, COLON, DIAGNOSTIC      EYE SURGERY      HERNIA REPAIR      [place]    LAPAROSCOPY INSERTION PERITONEAL CATHETER N/A 2/27/2019    LAPAROSCOPIC PERITONEAL DIALYSIS CATHETER PLACEMENT AND POSSIBLE OMENTOPEXY performed by Malina Queen MD at [address] [place] [address] ENDOSCOPY  2/22/2006    DR. SANCHEZ     Family History   Problem Relation Age of Onset    Breast Cancer Mother 80    Hypertension Mother     Heart Disease Mother         pacemaker    Colon Polyps Mother     High Blood Pressure Mother     Hypertension Sister     High Blood Pressure Sister     Cancer Father         liver    Heart Disease Paternal Grandfather     Breast Cancer Other         maternal great aunt    High Blood Pressure Daughter     Heart Disease Daughter     Diabetes Daughter     Colon Cancer Neg Hx      Social History     Tobacco Use    Smoking status: Never Smoker    Smokeless tobacco: Never Used   Substance Use Topics    Alcohol use: No      Current Outpatient Medications   Medication Sig Dispense Refill    cloNIDine (CATAPRES) 0.2 MG tablet Take 0.1 mg by mouth 2 times daily      amLODIPine (NORVASC) 5 MG tablet Take 5 mg by mouth daily      lisinopril (PRINIVIL;ZESTRIL) 20 MG tablet Take 20 mg by mouth daily      linaclotide (LINZESS) 145 MCG capsule Take 145 mcg by mouth every morning (before breakfast)      sevelamer (RENVELA) 800 MG tablet Take 1 tablet by mouth 3 times daily (with meals)      levothyroxine (SYNTHROID) 50 MCG tablet Take 1 tablet by mouth Daily Indications: Underactive Thyroid 90 tablet 1    carvedilol (COREG) 3.125 MG tablet Take 3.125 mg by mouth 2 times daily (with meals)      Ferric Citrate (AURYXIA) 1  MG(Fe) TABS Take 1 tablet by mouth 3 times daily (with meals)      B Complex-C (SUPER B COMPLEX PO) Take 1 tablet by mouth daily      docusate sodium (COLACE) 100 MG capsule Take 100 mg by mouth 2 times daily      potassium chloride (KLOR-CON) 20 MEQ packet Take 20 mEq by mouth daily       aspirin 81 MG tablet Take 81 mg by mouth daily      bumetanide (BUMEX) 2 MG tablet Take 4 mg by mouth 2 times daily       SODIUM BICARBONATE PO Take 1,300 mg by mouth 2 times daily Indications: Progressive Kidney Disease       Multiple Vitamins-Minerals (PRESERVISION AREDS 2 PO) Take 1 tablet by mouth 2 times daily       Omega-3 Fatty Acids (FISH OIL) 1000 MG CPDR Take 1,000 mg by mouth 4 times daily       Cyanocobalamin (VITAMIN B-12 IJ) Inject as directed every 30 days        No current facility-administered medications for this visit. Allergies: Metolazone and Statins    Review of Systems  Review of Systems   Constitutional: Negative for activity change, diaphoresis, fatigue, fever and unexpected weight change. HENT: Negative for facial swelling and nosebleeds. Eyes: Negative for redness and visual disturbance. Respiratory: Negative for cough, chest tightness, shortness of breath and wheezing. Cardiovascular: Negative for chest pain, palpitations and leg swelling. Gastrointestinal: Negative for abdominal pain, nausea and vomiting. Endocrine: Negative for cold intolerance and heat intolerance. Genitourinary: Negative for dysuria and hematuria. Musculoskeletal: Negative for arthralgias and myalgias. Skin: Negative for pallor and rash.    Neurological: Negative for dizziness, seizures, syncope, weakness and light-headedness. Hematological: Does not bruise/bleed easily. Psychiatric/Behavioral: Negative for agitation. The patient is not nervous/anxious. Objective  Vital Signs - /62   Pulse 68   Ht 5' 2\" (1.575 m)   Wt 130 lb (59 kg)   LMP  (LMP Unknown)   BMI 23.78 kg/m²   Physical Exam   Constitutional: She is oriented to person, place, and time. She appears well-developed and well-nourished. No distress. HENT:   Head: Normocephalic and atraumatic. Right Ear: Hearing and external ear normal.   Left Ear: Hearing and external ear normal.   Nose: Nose normal.   Eyes: Pupils are equal, round, and reactive to light. Right eye exhibits no discharge. Left eye exhibits no discharge. Neck: No JVD present. No tracheal deviation present. No thyromegaly present. Cardiovascular: Normal rate, regular rhythm, normal heart sounds and intact distal pulses. Exam reveals no gallop and no friction rub. No murmur heard. No carotid bruit   Pulmonary/Chest: Effort normal and breath sounds normal. No respiratory distress. She has no wheezes. She has no rales. Abdominal: Soft. There is no tenderness. Musculoskeletal: She exhibits no edema. Normal gait and station   Neurological: She is alert and oriented to person, place, and time. No cranial nerve deficit. Skin: Skin is warm and dry. No rash noted. Psychiatric: She has a normal mood and affect. Her behavior is normal. Judgment normal.   Nursing note and vitals reviewed. Data:  Echo 5/20/19  Summary   Normal LV size and systolic function. LV ejection fraction estimated at   60%. Mild LVH with sigmoid septal configuration.   Grade 1-2 diastolic dysfunction.   Normal right ventricular size.  Swoope Plump is an apparent large echodensity on the posterior wall of the left   atrium either extrinsic or intrinsic to the left atrium.  This needs   further study.   Normal right atrial size   Mitral valve mildly thickened with apparent prolapse of posterior leaflet   due to echodensity on posterior wall of left atrium.   Mild mitral regurgitation. No stenosis noted   Aortic valve mildly thickened and is trileaflet with normal opening. Mild   aortic regurgitation noted. CT Chest  1. There is a 1 cm right thyroid nodule. Consider follow-up ultrasound   if not performed previously. 2. Aberrant origin of the right subclavian artery. Atheromatous   disease of the thoracic aorta and coronary arteries. There is dense   atheromatous disease of the mitral valve. There are prominent   papillary muscles in the left ventricle. 3. Diffuse contrast enhancement throughout the left atrium. A left   atrial mass is not visualized on CT. 4. Very large hiatal hernia with size measurements listed above. Most   of the stomach is contained within the hernia. A portion of the   splenic flexure of the colon is in the hernia. There is also   peritoneal fat. 5. Linear scarring or atelectasis in both lower lobes. 6. Prior cholecystectomy. Small amount of ascites in the visualized   abdomen. Assessment:     Diagnosis Orders   1. Atherosclerosis of native coronary artery of native heart without angina pectoris  NM MYOCARDIAL SPECT REST EXERCISE OR RX   2. PVC (premature ventricular contraction)     3. Essential hypertension     4. Diastolic dysfunction     5. Non-rheumatic mitral regurgitation     6. Nonrheumatic aortic valve insufficiency     7. ESRD on peritoneal dialysis [facility])       Atherosclerosis of coronary artery-found on CT chest.  DSE was canceled by Dr. Elizabeth Richard and pt was told she may need a heart cath. Discuss further Dr. Elizabeth Richard and will get back to patient    PVC-1% burden per ZIO Patch heart monitor. Dr. Yanet Scherer has started her on carvedilol. She is unaware of the PVCs.   Heart rate is controlled    Hypertension-well-controlled on current regimen    Diastolic dysfunction-found per recent echo without symptoms of heart failure    Mitral and aortic regurgitation-per recent echo. Reviewed with patient    ESRD-on peritoneal dialysis    Stable cardiovascular status. No evidence of overt heart failure,angina or dysrhythmia. Plan    Orders Placed This Encounter   Procedures    NM MYOCARDIAL SPECT REST EXERCISE OR RX     With myocardial perfusion study with sestamibi     Standing Status:   Future     Standing Expiration Date:   6/18/2020     Order Specific Question:   Reason for Exam?     Answer: Other     Order Specific Question:   Procedure Type     Answer:   Rx     Order Specific Question:   Reason for exam:     Answer:   atheromatous coronaries per CT chest     Return in about 6 weeks (around 7/30/2019) for APRN. Will discuss with Dr. Garima Higgins and get back to you    1600- Discussed with Dr. Garima Higgins. He would like a Lexiscan Nuclear Stress Test.  Orders placed and pt notified    Call with any questionsor concerns  Follow up with MEHREEN Campbell for non cardiac problems  Report any new problems  Cardiovascular Fitness-Exercise as tolerated. Strive for 15 minutes of exercise most days of the week. Cardiac / HealthyDiet  Continue current medications as directed  Continue plan of treatment  It is always recommended that you bring your medicationsbottles with you to each visit - this is for your safety!        MEHREEN Owen

## 2019-06-18 NOTE — PATIENT INSTRUCTIONS
Return in about 6 weeks (around 7/30/2019) for APRN. Will discuss with Dr. Nelson Roman and get back to you    Call with any questionsor concerns  Follow up with MEHREEN Aguirre for non cardiac problems  Report any new problems  Cardiovascular Fitness-Exercise as tolerated. Strive for 15 minutes of exercise most days of the week. Cardiac / HealthyDiet  Continue current medications as directed  Continue plan of treatment  It is always recommended that you bring your medicationsbottles with you to each visit - this is for your safety!

## 2019-07-03 ENCOUNTER — HOSPITAL ENCOUNTER (OUTPATIENT)
Dept: MAMMOGRAPHY | Facility: HOSPITAL | Age: 79
Discharge: HOME | End: 2019-07-03
Attending: SPECIALIST | Admitting: SPECIALIST

## 2019-07-03 DIAGNOSIS — Z12.31 ENCOUNTER FOR SCREENING MAMMOGRAM FOR MALIGNANT NEOPLASM OF BREAST: ICD-10-CM

## 2019-07-03 PROCEDURE — 77063 BREAST TOMOSYNTHESIS BI: CPT

## 2019-07-03 PROCEDURE — 77067 SCR MAMMO BI INCL CAD: CPT

## 2019-07-05 ENCOUNTER — OFFICE VISIT (OUTPATIENT)
Dept: FAMILY MEDICINE CLINIC | Age: 79
End: 2019-07-05
Payer: MEDICARE

## 2019-07-05 VITALS
SYSTOLIC BLOOD PRESSURE: 106 MMHG | WEIGHT: 129.5 LBS | OXYGEN SATURATION: 97 % | TEMPERATURE: 97.6 F | DIASTOLIC BLOOD PRESSURE: 64 MMHG | BODY MASS INDEX: 23.69 KG/M2 | HEART RATE: 83 BPM

## 2019-07-05 DIAGNOSIS — E03.9 ACQUIRED HYPOTHYROIDISM: Primary | ICD-10-CM

## 2019-07-05 DIAGNOSIS — E04.1 THYROID NODULE: ICD-10-CM

## 2019-07-05 DIAGNOSIS — Z99.2 ESRD ON PERITONEAL DIALYSIS (HCC): ICD-10-CM

## 2019-07-05 DIAGNOSIS — N18.6 ESRD ON PERITONEAL DIALYSIS (HCC): ICD-10-CM

## 2019-07-05 PROCEDURE — 1090F PRES/ABSN URINE INCON ASSESS: CPT | Performed by: CLINICAL NURSE SPECIALIST

## 2019-07-05 PROCEDURE — G8399 PT W/DXA RESULTS DOCUMENT: HCPCS | Performed by: CLINICAL NURSE SPECIALIST

## 2019-07-05 PROCEDURE — 1123F ACP DISCUSS/DSCN MKR DOCD: CPT | Performed by: CLINICAL NURSE SPECIALIST

## 2019-07-05 PROCEDURE — G8427 DOCREV CUR MEDS BY ELIG CLIN: HCPCS | Performed by: CLINICAL NURSE SPECIALIST

## 2019-07-05 PROCEDURE — G8598 ASA/ANTIPLAT THER USED: HCPCS | Performed by: CLINICAL NURSE SPECIALIST

## 2019-07-05 PROCEDURE — 99213 OFFICE O/P EST LOW 20 MIN: CPT | Performed by: CLINICAL NURSE SPECIALIST

## 2019-07-05 PROCEDURE — 1036F TOBACCO NON-USER: CPT | Performed by: CLINICAL NURSE SPECIALIST

## 2019-07-05 PROCEDURE — G8420 CALC BMI NORM PARAMETERS: HCPCS | Performed by: CLINICAL NURSE SPECIALIST

## 2019-07-05 PROCEDURE — 4040F PNEUMOC VAC/ADMIN/RCVD: CPT | Performed by: CLINICAL NURSE SPECIALIST

## 2019-07-05 ASSESSMENT — ENCOUNTER SYMPTOMS
ABDOMINAL PAIN: 0
DIARRHEA: 0
SORE THROAT: 0
WHEEZING: 0
SINUS PRESSURE: 0
CONSTIPATION: 0
TROUBLE SWALLOWING: 0
COUGH: 0
SHORTNESS OF BREATH: 0
COLOR CHANGE: 0
FACIAL SWELLING: 0
EYE PAIN: 0
BACK PAIN: 0
CHEST TIGHTNESS: 0
EYE REDNESS: 0
EYE DISCHARGE: 0

## 2019-07-05 NOTE — PROGRESS NOTES
SUBJECTIVE:  Lc Clay is a [de-identified] y.o. who presents today for 3 Month Follow-Up      HPI    Ms Jyoti Walker presents today for 3 month follow up. Acquired hypothyroidism, remains on replacement therapy. Compliant with dosing. No overt s/sx of hyper or hypothyroidism. Incidental 1cm thyroid nodule noted on recent CT chest, has not had ultrasound. No dysphagia noted. Doing well on peritoneal dialysis. Going to dialysis clinic monthly with labs. No complications at this time. Blood pressure and swelling have improved. She has had her mammogram, [facility], Dr Alexi Kothari-- ok    Scheduled for stress test this month    Past Medical History:   Diagnosis Date    Acid reflux     Anemia     B12 deficiency 1/4/2016    Chronic kidney disease     sees Dr. 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 Perry Ferreira  1/22/2010    DR. SANCHEZ    ENDOSCOPY, COLON, DIAGNOSTIC      EYE SURGERY      [facility]ERNIA REPAIR      [facility]    LAPAROSCOPY INSERTION PERITONEAL CATHETER N/A 2/27/2019    LAPAROSCOPIC PERITONEAL DIALYSIS CATHETER PLACEMENT AND POSSIBLE OMENTOPEXY performed by Shawn Avitia MD at [facility] ENDOSCOPY  2/22/2006    DR. Marshall     Family History   Problem Relation Age of Onset   Daisy Oshea Breast Cancer Mother 80    Hypertension Mother     Heart Disease Mother         pacemaker    Colon Polyps Mother     High Blood Pressure Mother     Hypertension Sister     High Blood Pressure Sister     Cancer Father         liver    Heart Disease Paternal Grandfather     Breast Cancer Other         maternal great aunt    High Blood Pressure Daughter     Heart

## 2019-07-08 LAB — POTASSIUM SERPL-SCNC: 3.7 MMOL/L (ref 3.5–5)

## 2019-07-09 ENCOUNTER — TRANSCRIBE ORDERS (OUTPATIENT)
Dept: ADMINISTRATIVE | Facility: HOSPITAL | Age: 79
End: 2019-07-09

## 2019-07-09 DIAGNOSIS — Z12.39 SCREENING BREAST EXAMINATION: Primary | ICD-10-CM

## 2019-07-18 ENCOUNTER — HOSPITAL ENCOUNTER (OUTPATIENT)
Dept: NUCLEAR MEDICINE | Age: 79
Discharge: HOME | End: 2019-07-20
Payer: MEDICARE

## 2019-07-18 ENCOUNTER — HOSPITAL ENCOUNTER (OUTPATIENT)
Dept: NON INVASIVE DIAGNOSTICS | Age: 79
Discharge: HOME | End: 2019-07-18
Payer: MEDICARE

## 2019-07-18 DIAGNOSIS — I25.10 ATHEROSCLEROSIS OF NATIVE CORONARY ARTERY OF NATIVE HEART WITHOUT ANGINA PECTORIS: ICD-10-CM

## 2019-07-18 PROCEDURE — 6360000002 HC RX W HCPCS: Performed by: INTERNAL MEDICINE

## 2019-07-18 PROCEDURE — 93017 CV STRESS TEST TRACING ONLY: CPT

## 2019-07-18 PROCEDURE — 78452 HT MUSCLE IMAGE SPECT MULT: CPT

## 2019-07-18 PROCEDURE — 3430000000 HC RX DIAGNOSTIC RADIOPHARMACEUTICAL: Performed by: CLINICAL NURSE SPECIALIST

## 2019-07-18 PROCEDURE — A9500 TC99M SESTAMIBI: HCPCS | Performed by: CLINICAL NURSE SPECIALIST

## 2019-07-18 RX ADMIN — TETRAKIS(2-METHOXYISOBUTYLISOCYANIDE)COPPER(I) TETRAFLUOROBORATE 10 MILLICURIE: 1 INJECTION, POWDER, LYOPHILIZED, FOR SOLUTION INTRAVENOUS at 11:15

## 2019-07-18 RX ADMIN — TETRAKIS(2-METHOXYISOBUTYLISOCYANIDE)COPPER(I) TETRAFLUOROBORATE 30 MILLICURIE: 1 INJECTION, POWDER, LYOPHILIZED, FOR SOLUTION INTRAVENOUS at 11:15

## 2019-07-18 RX ADMIN — REGADENOSON 0.4 MG: 0.08 INJECTION, SOLUTION INTRAVENOUS at 10:34

## 2019-07-19 LAB
LV EF: 51 %
LVEF MODALITY: NORMAL

## 2019-08-13 NOTE — PROGRESS NOTES
Patient ID:   Susanne Valentine is a 77 y.o. female.  Referring Physician:   García Wood MD  [address] [place] [address]  Primary Care Provider:  Saman Castorena DO    Assessment/Plan:  Assessment   Patient Active Problem List   Diagnosis   • Anemia of chronic kidney failure   • Anemia of chronic renal failure, stage 3 (moderate)   • B12 deficiency     Cancer Staging Information:  No matching staging information was found for the patient.    Susanne was seen today for follow-up.    Diagnoses and all orders for this visit:    Anemia of chronic renal failure, stage 3 (moderate)  -     Clinic Appointment Request    Anemia of chronic kidney failure, stage 3 (moderate)  -     Clinic Appointment Request      The patient has a stable anemia from her chronic kidney disease, with the use of Procrit once a month as necessary.  We will hold the treatment today, and have her come back in 1 month with a repeat CBC, we will proceed with Procrit at 40,000 units subcutaneously if her hemoglobin is less than 10 g/DL.  Patient ID:   Susanne Valentine is a 77 y.o. female.  Referring Physician:   García Wood MD  [address] [place] [address]  Primary Care Provider:  Saman Castorena DO    Assessment/Plan:  Assessment   Patient Active Problem List   Diagnosis   • Anemia of chronic kidney failure   • Anemia of chronic renal failure, stage 3 (moderate)   • B12 deficiency     Cancer Staging Information:  No matching staging information was found for the patient.    Susanne was seen today for follow-up.    Diagnoses and all orders for this visit:    Anemia of chronic renal failure, stage 3 (moderate)  -     Clinic Appointment Request    Anemia of chronic kidney failure, stage 3 (moderate)  -     Clinic Appointment Request      The patient has a stable anemia from her chronic kidney disease, with the use of Procrit once a month as necessary.  We will hold the treatment today, and have her come back in 1 month with a repeat CBC,  "we will proceed with Procrit at 40,000 units subcutaneously if her hemoglobin is less than 10 g/DL.     Interval History:  The patient is here for follow-up and for possible consideration for Procrit.  She has no new complaints at this time and has been doing well.  Apparently her   5 years ago, and she reminded me that I am seeing her in the past when I was still here.  She did not get any Procrit on her last visit    Interval Notes:  The following portions of the patient's history were reviewed and updated as appropriate: allergies, current medications, past family history, past medical history, past social history, past surgical history and problem list.     History of Present Illness   76-year-old white lady with chronic kidney disease has an anemia and has been on ESAs since . She has been tolerating it well and her anemia has been stable with a supplement.    Review of Systems   Constitutional: Negative.    HENT: Negative.    Eyes: Negative.    Respiratory: Negative.    Cardiovascular: Negative.    Gastrointestinal: Negative.    Endocrine: Negative.    Genitourinary: Negative.    Musculoskeletal: Negative.    Allergic/Immunologic: Negative.    Neurological: Negative.    Hematological: Negative.    Psychiatric/Behavioral: Negative.         Physical Exam:  Vital Signs for this encounter:  BSA: 1.59 meters squared  /80  Pulse 67  Temp 97.4 °F (36.3 °C) (Tympanic)   Resp 17  Ht 62\" (157.5 cm)  Wt 129 lb 8 oz (58.7 kg)  LMP  (LMP Unknown)  SpO2 97%  BMI 23.69 kg/m2    Physical Exam   Constitutional: She is oriented to person, place, and time. She appears well-developed and well-nourished. No distress.   HENT:   Head: Normocephalic and atraumatic.   Nose: Nose normal.   Mouth/Throat: Oropharynx is clear and moist. No oropharyngeal exudate.   Eyes: Conjunctivae and EOM are normal. Pupils are equal, round, and reactive to light. No scleral icterus.   Neck: Normal range of motion. Neck " supple. No JVD present. No thyromegaly present.   Cardiovascular: Normal rate, regular rhythm and normal heart sounds.  Exam reveals no gallop and no friction rub.    Pulmonary/Chest: Effort normal and breath sounds normal. No respiratory distress. She has no wheezes. She has no rales. She exhibits no tenderness.   Abdominal: Soft. Bowel sounds are normal. She exhibits no distension. There is no tenderness. There is no guarding.   Musculoskeletal: Normal range of motion. She exhibits no edema or tenderness.   Lymphadenopathy:     She has no cervical adenopathy.   Neurological: She is alert and oriented to person, place, and time. No cranial nerve deficit.   Skin: Skin is warm and dry.   Few purpuric spots, forearms   Psychiatric: She has a normal mood and affect. Her behavior is normal. Judgment and thought content normal.       Performance Status:  Asymptomatic    Results:  WBC   Date Value Ref Range Status   10/26/2017 6.20 4.80 - 10.80 10*3/mm3 Final     Hemoglobin   Date Value Ref Range Status   10/26/2017 10.7 (L) 12.0 - 16.0 g/dL Final     Hematocrit   Date Value Ref Range Status   10/26/2017 32.3 (L) 37.0 - 47.0 % Final     Platelets   Date Value Ref Range Status   10/26/2017 239 130 - 400 10*3/mm3 Final     Creatinine   Date Value Ref Range Status   2017 2.56 (H) 0.50 - 1.40 mg/dL Final     AST (SGOT)   Date Value Ref Range Status   2017 18 7 - 45 U/L Final        Interval History:  The patient is here for follow-up and for possible consideration for Procrit.  She has no new complaints at this time and has been doing well.  Apparently her   5 years ago, and she reminded me that I am seeing her in the past when I was still here.  She did not get any Procrit on her last visit    Interval Notes:  The following portions of the patient's history were reviewed and updated as appropriate: allergies, current medications, past family history, past medical history, past social history, past  "surgical history and problem list.     History of Present Illness   76-year-old white lady with chronic kidney disease has an anemia and has been on ESAs since 2008. She has been tolerating it well and her anemia has been stable with a supplement.    Review of Systems   Constitutional: Negative.    HENT: Negative.    Eyes: Negative.    Respiratory: Negative.    Cardiovascular: Negative.    Gastrointestinal: Negative.    Endocrine: Negative.    Genitourinary: Negative.    Musculoskeletal: Negative.    Allergic/Immunologic: Negative.    Neurological: Negative.    Hematological: Negative.    Psychiatric/Behavioral: Negative.         Physical Exam:  Vital Signs for this encounter:  BSA: 1.59 meters squared  /80  Pulse 67  Temp 97.4 °F (36.3 °C) (Tympanic)   Resp 17  Ht 62\" (157.5 cm)  Wt 129 lb 8 oz (58.7 kg)  LMP  (LMP Unknown)  SpO2 97%  BMI 23.69 kg/m2    Physical Exam   Constitutional: She is oriented to person, place, and time. She appears well-developed and well-nourished. No distress.   HENT:   Head: Normocephalic and atraumatic.   Nose: Nose normal.   Mouth/Throat: Oropharynx is clear and moist. No oropharyngeal exudate.   Eyes: Conjunctivae and EOM are normal. Pupils are equal, round, and reactive to light. No scleral icterus.   Neck: Normal range of motion. Neck supple. No JVD present. No thyromegaly present.   Cardiovascular: Normal rate, regular rhythm and normal heart sounds.  Exam reveals no gallop and no friction rub.    Pulmonary/Chest: Effort normal and breath sounds normal. No respiratory distress. She has no wheezes. She has no rales. She exhibits no tenderness.   Abdominal: Soft. Bowel sounds are normal. She exhibits no distension. There is no tenderness. There is no guarding.   Musculoskeletal: Normal range of motion. She exhibits no edema or tenderness.   Lymphadenopathy:     She has no cervical adenopathy.   Neurological: She is alert and oriented to person, place, and time. No " cranial nerve deficit.   Skin: Skin is warm and dry.   Few purpuric spots, forearms   Psychiatric: She has a normal mood and affect. Her behavior is normal. Judgment and thought content normal.       Performance Status:  Asymptomatic    Results:  WBC   Date Value Ref Range Status   10/26/2017 6.20 4.80 - 10.80 10*3/mm3 Final     Hemoglobin   Date Value Ref Range Status   10/26/2017 10.7 (L) 12.0 - 16.0 g/dL Final     Hematocrit   Date Value Ref Range Status   10/26/2017 32.3 (L) 37.0 - 47.0 % Final     Platelets   Date Value Ref Range Status   10/26/2017 239 130 - 400 10*3/mm3 Final     Creatinine   Date Value Ref Range Status   08/29/2017 2.56 (H) 0.50 - 1.40 mg/dL Final     AST (SGOT)   Date Value Ref Range Status   08/29/2017 18 7 - 45 U/L Final     Anemia of chronic kidney disease.  Hemoglobin today is 10.7gm%,  continue Procrit.  Patient clinically asymptomatic.  Over the last 3 months for MCV of, therefore I am would check vitamin B12 and folic acid on today's draw and I have asked her to start taking oral folic acid 400 µg daily as well as start receiving vitamin B12 1000 µg subcutaneous every 4 weekly.  If the MCV remains high in another 3-4 months I may consider doing a bone marrow biopsy to diagnose myelodysplasia.  The last colonoscopy was done within the last 5 years and a verbal report given to me by the patient it was negative.   Doxepin Counseling:  Patient advised that the medication is sedating and not to drive a car after taking this medication. Patient informed of potential adverse effects including but not limited to dry mouth, urinary retention, and blurry vision.  The patient verbalized understanding of the proper use and possible adverse effects of doxepin.  All of the patient's questions and concerns were addressed.

## 2019-10-08 ENCOUNTER — OFFICE VISIT (OUTPATIENT)
Dept: FAMILY MEDICINE CLINIC | Age: 79
End: 2019-10-08
Payer: MEDICARE

## 2019-10-08 ENCOUNTER — TELEPHONE (OUTPATIENT)
Dept: FAMILY MEDICINE CLINIC | Age: 79
End: 2019-10-08

## 2019-10-08 VITALS
HEART RATE: 82 BPM | WEIGHT: 131 LBS | TEMPERATURE: 96.8 F | HEIGHT: 62 IN | OXYGEN SATURATION: 95 % | BODY MASS INDEX: 24.11 KG/M2 | SYSTOLIC BLOOD PRESSURE: 132 MMHG | DIASTOLIC BLOOD PRESSURE: 80 MMHG

## 2019-10-08 DIAGNOSIS — E03.9 ACQUIRED HYPOTHYROIDISM: ICD-10-CM

## 2019-10-08 DIAGNOSIS — Z00.00 ROUTINE GENERAL MEDICAL EXAMINATION AT A HEALTH CARE FACILITY: Primary | ICD-10-CM

## 2019-10-08 DIAGNOSIS — E04.1 THYROID NODULE: ICD-10-CM

## 2019-10-08 LAB — TSH SERPL DL<=0.05 MIU/L-ACNC: 4.7 UIU/ML (ref 0.27–4.2)

## 2019-10-08 PROCEDURE — 1036F TOBACCO NON-USER: CPT | Performed by: CLINICAL NURSE SPECIALIST

## 2019-10-08 PROCEDURE — G8482 FLU IMMUNIZE ORDER/ADMIN: HCPCS | Performed by: CLINICAL NURSE SPECIALIST

## 2019-10-08 PROCEDURE — 1123F ACP DISCUSS/DSCN MKR DOCD: CPT | Performed by: CLINICAL NURSE SPECIALIST

## 2019-10-08 PROCEDURE — G8399 PT W/DXA RESULTS DOCUMENT: HCPCS | Performed by: CLINICAL NURSE SPECIALIST

## 2019-10-08 PROCEDURE — 4040F PNEUMOC VAC/ADMIN/RCVD: CPT | Performed by: CLINICAL NURSE SPECIALIST

## 2019-10-08 PROCEDURE — G8598 ASA/ANTIPLAT THER USED: HCPCS | Performed by: CLINICAL NURSE SPECIALIST

## 2019-10-08 PROCEDURE — 1090F PRES/ABSN URINE INCON ASSESS: CPT | Performed by: CLINICAL NURSE SPECIALIST

## 2019-10-08 PROCEDURE — 99213 OFFICE O/P EST LOW 20 MIN: CPT | Performed by: CLINICAL NURSE SPECIALIST

## 2019-10-08 PROCEDURE — G8420 CALC BMI NORM PARAMETERS: HCPCS | Performed by: CLINICAL NURSE SPECIALIST

## 2019-10-08 PROCEDURE — G8427 DOCREV CUR MEDS BY ELIG CLIN: HCPCS | Performed by: CLINICAL NURSE SPECIALIST

## 2019-10-08 PROCEDURE — G0439 PPPS, SUBSEQ VISIT: HCPCS | Performed by: CLINICAL NURSE SPECIALIST

## 2019-10-08 RX ORDER — LEVOTHYROXINE SODIUM 0.05 MG/1
50 TABLET ORAL DAILY
Qty: 90 TABLET | Refills: 3 | Status: SHIPPED | OUTPATIENT
Start: 2019-10-08 | End: 2020-01-01

## 2019-10-08 ASSESSMENT — ENCOUNTER SYMPTOMS
SORE THROAT: 0
EYE REDNESS: 0
CONSTIPATION: 1
SINUS PRESSURE: 0
EYE DISCHARGE: 0
VOMITING: 0
CHEST TIGHTNESS: 0
COUGH: 0
NAUSEA: 0
BACK PAIN: 0
SHORTNESS OF BREATH: 0
EYE PAIN: 0
TROUBLE SWALLOWING: 0
COLOR CHANGE: 0
ABDOMINAL PAIN: 0
FACIAL SWELLING: 0
DIARRHEA: 0
WHEEZING: 0

## 2019-10-08 ASSESSMENT — PATIENT HEALTH QUESTIONNAIRE - PHQ9
SUM OF ALL RESPONSES TO PHQ QUESTIONS 1-9: 0
SUM OF ALL RESPONSES TO PHQ QUESTIONS 1-9: 0

## 2019-10-08 ASSESSMENT — LIFESTYLE VARIABLES: HOW OFTEN DO YOU HAVE A DRINK CONTAINING ALCOHOL: 0

## 2019-10-11 ENCOUNTER — HOSPITAL ENCOUNTER (OUTPATIENT)
Dept: ULTRASOUND IMAGING | Age: 79
Discharge: HOME | End: 2019-10-11
Payer: MEDICARE

## 2019-10-11 DIAGNOSIS — E03.9 ACQUIRED HYPOTHYROIDISM: ICD-10-CM

## 2019-10-11 DIAGNOSIS — E04.1 THYROID NODULE: ICD-10-CM

## 2019-10-11 PROCEDURE — 76536 US EXAM OF HEAD AND NECK: CPT

## 2019-10-14 DIAGNOSIS — E04.1 THYROID NODULE: Primary | ICD-10-CM

## 2019-10-29 ENCOUNTER — OFFICE VISIT (OUTPATIENT)
Dept: CARDIOLOGY | Age: 79
End: 2019-10-29
Payer: MEDICARE

## 2019-10-29 VITALS
HEART RATE: 64 BPM | WEIGHT: 133 LBS | HEIGHT: 62 IN | SYSTOLIC BLOOD PRESSURE: 126 MMHG | BODY MASS INDEX: 24.48 KG/M2 | DIASTOLIC BLOOD PRESSURE: 74 MMHG

## 2019-10-29 DIAGNOSIS — N18.6 ESRD ON PERITONEAL DIALYSIS (HCC): ICD-10-CM

## 2019-10-29 DIAGNOSIS — Z99.2 ESRD ON PERITONEAL DIALYSIS (HCC): ICD-10-CM

## 2019-10-29 DIAGNOSIS — T46.6X5A MYALGIA DUE TO STATIN: ICD-10-CM

## 2019-10-29 DIAGNOSIS — I34.0 NON-RHEUMATIC MITRAL REGURGITATION: ICD-10-CM

## 2019-10-29 DIAGNOSIS — I51.89 DIASTOLIC DYSFUNCTION: ICD-10-CM

## 2019-10-29 DIAGNOSIS — E78.2 MIXED HYPERLIPIDEMIA: ICD-10-CM

## 2019-10-29 DIAGNOSIS — I25.10 ATHEROSCLEROSIS OF NATIVE CORONARY ARTERY OF NATIVE HEART WITHOUT ANGINA PECTORIS: Primary | ICD-10-CM

## 2019-10-29 DIAGNOSIS — I10 ESSENTIAL HYPERTENSION: ICD-10-CM

## 2019-10-29 DIAGNOSIS — M79.10 MYALGIA DUE TO STATIN: ICD-10-CM

## 2019-10-29 DIAGNOSIS — I49.3 PVC (PREMATURE VENTRICULAR CONTRACTION): ICD-10-CM

## 2019-10-29 DIAGNOSIS — I35.1 NONRHEUMATIC AORTIC VALVE INSUFFICIENCY: ICD-10-CM

## 2019-10-29 PROCEDURE — G8598 ASA/ANTIPLAT THER USED: HCPCS | Performed by: CLINICAL NURSE SPECIALIST

## 2019-10-29 PROCEDURE — 4040F PNEUMOC VAC/ADMIN/RCVD: CPT | Performed by: CLINICAL NURSE SPECIALIST

## 2019-10-29 PROCEDURE — 1090F PRES/ABSN URINE INCON ASSESS: CPT | Performed by: CLINICAL NURSE SPECIALIST

## 2019-10-29 PROCEDURE — G8482 FLU IMMUNIZE ORDER/ADMIN: HCPCS | Performed by: CLINICAL NURSE SPECIALIST

## 2019-10-29 PROCEDURE — 99213 OFFICE O/P EST LOW 20 MIN: CPT | Performed by: CLINICAL NURSE SPECIALIST

## 2019-10-29 PROCEDURE — 1036F TOBACCO NON-USER: CPT | Performed by: CLINICAL NURSE SPECIALIST

## 2019-10-29 PROCEDURE — G8399 PT W/DXA RESULTS DOCUMENT: HCPCS | Performed by: CLINICAL NURSE SPECIALIST

## 2019-10-29 PROCEDURE — G8427 DOCREV CUR MEDS BY ELIG CLIN: HCPCS | Performed by: CLINICAL NURSE SPECIALIST

## 2019-10-29 PROCEDURE — G8420 CALC BMI NORM PARAMETERS: HCPCS | Performed by: CLINICAL NURSE SPECIALIST

## 2019-10-29 PROCEDURE — 1123F ACP DISCUSS/DSCN MKR DOCD: CPT | Performed by: CLINICAL NURSE SPECIALIST

## 2019-10-29 ASSESSMENT — ENCOUNTER SYMPTOMS
COUGH: 0
SHORTNESS OF BREATH: 0
VOMITING: 0
EYE REDNESS: 0
FACIAL SWELLING: 0
ABDOMINAL PAIN: 0
WHEEZING: 0
CHEST TIGHTNESS: 0
NAUSEA: 0

## 2019-10-30 PROBLEM — T46.6X5A MYALGIA DUE TO STATIN: Status: ACTIVE | Noted: 2019-10-30

## 2019-10-30 PROBLEM — M79.10 MYALGIA DUE TO STATIN: Status: ACTIVE | Noted: 2019-10-30

## 2019-10-30 PROBLEM — I25.10 ATHEROSCLEROSIS OF NATIVE CORONARY ARTERY OF NATIVE HEART WITHOUT ANGINA PECTORIS: Status: ACTIVE | Noted: 2019-10-30

## 2019-11-18 ENCOUNTER — OFFICE VISIT (OUTPATIENT)
Dept: OTOLARYNGOLOGY | Age: 79
End: 2019-11-18
Payer: MEDICARE

## 2019-11-18 VITALS
BODY MASS INDEX: 24.66 KG/M2 | SYSTOLIC BLOOD PRESSURE: 130 MMHG | DIASTOLIC BLOOD PRESSURE: 80 MMHG | HEIGHT: 62 IN | WEIGHT: 134 LBS

## 2019-11-18 DIAGNOSIS — E04.1 THYROID NODULE: ICD-10-CM

## 2019-11-18 DIAGNOSIS — E78.2 MIXED HYPERLIPIDEMIA: ICD-10-CM

## 2019-11-18 LAB
CHOLESTEROL, TOTAL: 184 MG/DL (ref 160–199)
HDLC SERPL-MCNC: 80 MG/DL (ref 65–121)
LDL CHOLESTEROL CALCULATED: 92 MG/DL
TRIGL SERPL-MCNC: 59 MG/DL (ref 0–149)

## 2019-11-18 PROCEDURE — G8598 ASA/ANTIPLAT THER USED: HCPCS | Performed by: OTOLARYNGOLOGY

## 2019-11-18 PROCEDURE — 1123F ACP DISCUSS/DSCN MKR DOCD: CPT | Performed by: OTOLARYNGOLOGY

## 2019-11-18 PROCEDURE — 99202 OFFICE O/P NEW SF 15 MIN: CPT | Performed by: OTOLARYNGOLOGY

## 2019-11-18 PROCEDURE — G8482 FLU IMMUNIZE ORDER/ADMIN: HCPCS | Performed by: OTOLARYNGOLOGY

## 2019-11-18 PROCEDURE — 31575 DIAGNOSTIC LARYNGOSCOPY: CPT | Performed by: OTOLARYNGOLOGY

## 2019-11-18 PROCEDURE — G8420 CALC BMI NORM PARAMETERS: HCPCS | Performed by: OTOLARYNGOLOGY

## 2019-11-18 PROCEDURE — 1036F TOBACCO NON-USER: CPT | Performed by: OTOLARYNGOLOGY

## 2019-11-18 PROCEDURE — 1090F PRES/ABSN URINE INCON ASSESS: CPT | Performed by: OTOLARYNGOLOGY

## 2019-11-18 PROCEDURE — G8399 PT W/DXA RESULTS DOCUMENT: HCPCS | Performed by: OTOLARYNGOLOGY

## 2019-11-18 PROCEDURE — G8427 DOCREV CUR MEDS BY ELIG CLIN: HCPCS | Performed by: OTOLARYNGOLOGY

## 2019-11-18 PROCEDURE — 4040F PNEUMOC VAC/ADMIN/RCVD: CPT | Performed by: OTOLARYNGOLOGY

## 2019-11-22 ENCOUNTER — HOSPITAL ENCOUNTER (OUTPATIENT)
Dept: ULTRASOUND IMAGING | Age: 79
Discharge: HOME | End: 2019-11-22
Payer: MEDICARE

## 2019-11-22 DIAGNOSIS — E04.1 THYROID NODULE: ICD-10-CM

## 2019-11-22 PROCEDURE — 88173 CYTOPATH EVAL FNA REPORT: CPT

## 2019-11-22 PROCEDURE — 60100 BIOPSY OF THYROID: CPT

## 2019-11-22 PROCEDURE — 88177 CYTP FNA EVAL EA ADDL: CPT

## 2019-11-22 PROCEDURE — 88172 CYTP DX EVAL FNA 1ST EA SITE: CPT

## 2019-11-27 ENCOUNTER — TELEPHONE (OUTPATIENT)
Dept: OTOLARYNGOLOGY | Age: 79
End: 2019-11-27

## 2019-12-09 ENCOUNTER — TELEPHONE (OUTPATIENT)
Dept: OTOLARYNGOLOGY | Age: 79
End: 2019-12-09

## 2019-12-09 DIAGNOSIS — E04.1 THYROID NODULE: Primary | ICD-10-CM

## 2020-01-01 ENCOUNTER — VIRTUAL VISIT (OUTPATIENT)
Dept: OTOLARYNGOLOGY | Age: 80
End: 2020-01-01
Payer: MEDICARE

## 2020-01-01 ENCOUNTER — HOSPITAL ENCOUNTER (OUTPATIENT)
Dept: ULTRASOUND IMAGING | Age: 80
Discharge: HOME | End: 2020-11-09
Payer: MEDICARE

## 2020-01-01 ENCOUNTER — VIRTUAL VISIT (OUTPATIENT)
Dept: FAMILY MEDICINE CLINIC | Age: 80
End: 2020-01-01
Payer: MEDICARE

## 2020-01-01 ENCOUNTER — TELEPHONE (OUTPATIENT)
Dept: FAMILY MEDICINE CLINIC | Age: 80
End: 2020-01-01

## 2020-01-01 DIAGNOSIS — E03.9 ACQUIRED HYPOTHYROIDISM: ICD-10-CM

## 2020-01-01 LAB — TSH SERPL DL<=0.05 MIU/L-ACNC: 5.47 UIU/ML (ref 0.27–4.2)

## 2020-01-01 PROCEDURE — 99441 PR PHYS/QHP TELEPHONE EVALUATION 5-10 MIN: CPT | Performed by: OTOLARYNGOLOGY

## 2020-01-01 PROCEDURE — 1123F ACP DISCUSS/DSCN MKR DOCD: CPT | Performed by: CLINICAL NURSE SPECIALIST

## 2020-01-01 PROCEDURE — 76536 US EXAM OF HEAD AND NECK: CPT

## 2020-01-01 PROCEDURE — 4040F PNEUMOC VAC/ADMIN/RCVD: CPT | Performed by: CLINICAL NURSE SPECIALIST

## 2020-01-01 PROCEDURE — G0439 PPPS, SUBSEQ VISIT: HCPCS | Performed by: CLINICAL NURSE SPECIALIST

## 2020-01-01 RX ORDER — LEVOTHYROXINE SODIUM 0.05 MG/1
TABLET ORAL
Qty: 90 TABLET | Refills: 2 | Status: SHIPPED | OUTPATIENT
Start: 2020-01-01 | End: 2021-01-01 | Stop reason: SDUPTHER

## 2020-01-01 ASSESSMENT — PATIENT HEALTH QUESTIONNAIRE - PHQ9
1. LITTLE INTEREST OR PLEASURE IN DOING THINGS: 0
2. FEELING DOWN, DEPRESSED OR HOPELESS: 0
SUM OF ALL RESPONSES TO PHQ QUESTIONS 1-9: 0
SUM OF ALL RESPONSES TO PHQ9 QUESTIONS 1 & 2: 0

## 2020-01-01 ASSESSMENT — LIFESTYLE VARIABLES: HOW OFTEN DO YOU HAVE A DRINK CONTAINING ALCOHOL: 0

## 2020-04-07 LAB
HCT VFR BLD CALC: 33 % (ref 37–47)
HEMOGLOBIN: 10.3 G/DL (ref 12–16)
MCH RBC QN AUTO: 33.2 PG (ref 27–31)
MCHC RBC AUTO-ENTMCNC: 31.2 G/DL (ref 33–37)
MCV RBC AUTO: 106.5 FL (ref 81–99)
PDW BLD-RTO: 16.1 % (ref 11.5–14.5)
PLATELET # BLD: 354 K/UL (ref 130–400)
PMV BLD AUTO: 10.4 FL (ref 9.4–12.3)
POTASSIUM SERPL-SCNC: 3.9 MMOL/L (ref 3.5–5)
RBC # BLD: 3.1 M/UL (ref 4.2–5.4)
WBC # BLD: 10.8 K/UL (ref 4.8–10.8)

## 2020-04-27 ENCOUNTER — VIRTUAL VISIT (OUTPATIENT)
Dept: CARDIOLOGY | Age: 80
End: 2020-04-27
Payer: MEDICARE

## 2020-04-27 VITALS
HEART RATE: 76 BPM | DIASTOLIC BLOOD PRESSURE: 87 MMHG | HEIGHT: 62 IN | WEIGHT: 125 LBS | SYSTOLIC BLOOD PRESSURE: 124 MMHG | BODY MASS INDEX: 23 KG/M2

## 2020-04-27 PROCEDURE — 99441 PR PHYS/QHP TELEPHONE EVALUATION 5-10 MIN: CPT | Performed by: INTERNAL MEDICINE

## 2020-04-27 RX ORDER — MIDODRINE HYDROCHLORIDE 10 MG/1
10 TABLET ORAL 3 TIMES DAILY
COMMUNITY
End: 2020-04-27 | Stop reason: DRUGHIGH

## 2020-04-27 RX ORDER — MIDODRINE HYDROCHLORIDE 5 MG/1
5 TABLET ORAL 3 TIMES DAILY
COMMUNITY

## 2020-04-27 NOTE — PROGRESS NOTES
to 89 mmHg. Her weights have been stable on her peritoneal dialysis and she denies any significant dyspnea, orthopnea, or PND. She has had no angina. She is practicing careful social distancing with the help of the son and her daughter. Assessment/plan:  1. Hypertension/hypotension -will address as discussed above with acceptable pressures  2. Coronary disease -asymptomatic with July 2019 Garold Carrel demonstrating no significant ischemia. Questionable basal septal infarct with no segmental wall motion abnormalities seen on echo  3. Social distancing -aware and practicing        I Affirm this is a Patient Initiated Episode with an Established Patient who has not had a related appointment within my department in the past 7 days or scheduled within the next 24 hours.     Patient identification was verified at the start of the visit: Yes    Total Time: 5 - 10 minutes    Note: not billable if this call serves to triage the patient into an appointment for the relevant concern      [address]

## 2020-05-26 ENCOUNTER — VIRTUAL VISIT (OUTPATIENT)
Dept: FAMILY MEDICINE CLINIC | Age: 80
End: 2020-05-26
Payer: MEDICARE

## 2020-05-26 VITALS
HEIGHT: 62 IN | DIASTOLIC BLOOD PRESSURE: 80 MMHG | SYSTOLIC BLOOD PRESSURE: 128 MMHG | TEMPERATURE: 97.8 F | WEIGHT: 126 LBS | BODY MASS INDEX: 23.19 KG/M2 | HEART RATE: 80 BPM

## 2020-05-26 PROCEDURE — 99441 PR PHYS/QHP TELEPHONE EVALUATION 5-10 MIN: CPT | Performed by: CLINICAL NURSE SPECIALIST

## 2020-07-06 ENCOUNTER — HOSPITAL ENCOUNTER (OUTPATIENT)
Dept: MAMMOGRAPHY | Facility: HOSPITAL | Age: 80
Discharge: HOME | End: 2020-07-06
Admitting: SPECIALIST

## 2020-07-06 DIAGNOSIS — Z12.39 SCREENING BREAST EXAMINATION: ICD-10-CM

## 2020-07-06 PROCEDURE — 77063 BREAST TOMOSYNTHESIS BI: CPT

## 2020-07-06 PROCEDURE — 77067 SCR MAMMO BI INCL CAD: CPT

## 2020-07-10 ENCOUNTER — TRANSCRIBE ORDERS (OUTPATIENT)
Dept: ADMINISTRATIVE | Facility: HOSPITAL | Age: 80
End: 2020-07-10

## 2020-07-10 DIAGNOSIS — Z12.31 ENCOUNTER FOR SCREENING MAMMOGRAM FOR MALIGNANT NEOPLASM OF BREAST: Primary | ICD-10-CM

## 2020-11-18 NOTE — TELEPHONE ENCOUNTER
Okay to offer video visit for this patient or would you prefer to reschedule for an in office visit at a later date?

## 2020-11-18 NOTE — TELEPHONE ENCOUNTER
Per message below, it is her preference. If no issues ok for virtual visit, but I have not seen her in person in over a year so she can come in for visit also. Her comfort preference.

## 2020-11-18 NOTE — TELEPHONE ENCOUNTER
Patient called and is wanting to know if you still want her to come to her OV or reschedule due to her being on home Dialyses

## 2020-11-19 NOTE — ASSESSMENT & PLAN NOTE
Narrative    EXAM: US THYROID -- 11/9/2020 9:39 AM    HISTORY: 80 years, Female, E04.1,    COMPARISON: 11/22/2019, 10/11/2019    FINDINGS:    RIGHT lobe. Measures 3.1 x 1.8 x 1.2 cm. ISTHMUS. Measures 0.3 cm. LEFT lobe. Measures 2.5 x 1.1 x 1.2 cm. Nodule 1 -    Location: Right    Size: 2.2 x 1.2 x 1.6 cm, previously 2.2 x 1.2 x 1.6 cm on 11/10/2019. Composition: solid or almost completely solid (2 points)    Echogenicity: hyperechoic or isoechoic (1 point)    Shape: wider than tall (0 points)    Margin: smooth (0 points)    Calcifications: none or large comet tail artifacts (0 points). Technologist questions microcalcifications, which I do not appreciate    on this exam. There is a macrocalcification, outside the nodule, at    the superior aspect of the right thyroid. ACR TI-RADS Category: TR3 - Mildly suspicious. Recommend FNA if greater than or equal to 2.5 cm. Recommend follow-up if greater than or equal to 1.5 cm. Pathology result 11/22/2019 right thyroid nodule \"unsatisfactory for    cytologic evaluation due to inadequate cellularity ([place] category    I). \"    Nodule 2 -    Location: Left isthmus    Size: 0.3 x 0.9 x 0.8 cm, previously 0.3 x 0.7 cm, but appears similar    considering differences in technique. Composition: solid or almost completely solid (2 points)    Echogenicity: hypoechoic (2 points)    Shape: wider than tall (0 points)    Margin: smooth (0 points)    Calcifications: none or large comet tail artifacts (0 points)    ACR TI-RADS Category: TR4 - moderately suspicious. Recommend FNA if greater than or equal to 1.5 cm. Recommend follow-up if greater than or equal to 1 cm.         Impression    1. Stable thyroid nodules compared to 10/11/2019. Signed by Dr Loraine Cronin on 11/9/2020 11:44 AM      No change in appearance based on recent ultrasound.   We will set up for follow-up ultrasound in 1 year

## 2020-11-19 NOTE — PROGRESS NOTES
Sybil Ritchie is a 79 y.o. female evaluated via telephone on 11/19/2020. Consent:  She and/or health care decision maker is aware that that she may receive a bill for this telephone service, depending on her insurance coverage, and has provided verbal consent to proceed: Yes      Documentation:  I communicated with the patient and/or health care decision maker about her history of thyroid nodules and recent ultrasound. .   Details of this discussion including any medical advice provided: She recently underwent thyroid ultrasound and we reviewed the results. She has a history of thyroid nodules and has had biopsy in the past.  I discussed with her that her most recent ultrasound showed that the nodules present appeared to be essentially unchanged when compared to the prior study and the right now based on this no further intervention or biopsies were needed however we would continue to follow her and we will set her up for a follow-up ultrasound in 1 year      I affirm this is a Patient Initiated Episode with a Patient who has not had a related appointment within my department in the past 7 days or scheduled within the next 24 hours.     Patient identification was verified at the start of the visit: Yes    Total Time: minutes: 5-10 minutes    Note: not billable if this call serves to triage the patient into an appointment for the relevant concern      Tereza Sumner

## 2020-11-24 NOTE — PATIENT INSTRUCTIONS
Personalized Preventive Plan for Connor Cardenas - 11/24/2020  Medicare offers a range of preventive health benefits. Some of the tests and screenings are paid in full while other may be subject to a deductible, co-insurance, and/or copay. Some of these benefits include a comprehensive review of your medical history including lifestyle, illnesses that may run in your family, and various assessments and screenings as appropriate. After reviewing your medical record and screening and assessments performed today your provider may have ordered immunizations, labs, imaging, and/or referrals for you. A list of these orders (if applicable) as well as your Preventive Care list are included within your After Visit Summary for your review. Other Preventive Recommendations:    · A preventive eye exam performed by an eye specialist is recommended every 1-2 years to screen for glaucoma; cataracts, macular degeneration, and other eye disorders. · A preventive dental visit is recommended every 6 months. · Try to get at least 150 minutes of exercise per week or 10,000 steps per day on a pedometer . · Order or download the FREE \"Exercise & Physical Activity: Your Everyday Guide\" from [employer]. Call 1-872.946.9835 or search [employer] online. · You need 7099-1771 mg of calcium and 2202-3647 IU of vitamin D per day. It is possible to meet your calcium requirement with diet alone, but a vitamin D supplement is usually necessary to meet this goal.  · When exposed to the sun, use a sunscreen that protects against both UVA and UVB radiation with an SPF of 30 or greater. Reapply every 2 to 3 hours or after sweating, drying off with a towel, or swimming. · Always wear a seat belt when traveling in a car. Always wear a helmet when riding a bicycle or motorcycle.

## 2020-11-24 NOTE — PROGRESS NOTES
Medicare Annual Wellness Visit  Are Name: Slick Felton Date: 2020   MRN: [id number] Sex: Female   Age: 79 y.o. Ethnicity: Non-/Non    : 1940 Race: Neil Simpson is here for Medicare AWV    Screenings for behavioral, psychosocial and functional/safety risks, and cognitive dysfunction are all negative except as indicated below. These results, as well as other patient data from the [address] form, are documented in Flowsheets linked to this Encounter. HM  Mammogram UTD BIC  No colonoscopy  Immunizations UTD    Allergies   Allergen Reactions    Metolazone Other (See Comments)     Causes fainting.  Statins Other (See Comments)     Muscle aches          Prior to Visit Medications    Medication Sig Taking?  Authorizing Provider   levothyroxine (SYNTHROID) 50 MCG tablet TAKE ONE TABLET BY MOUTH DAILY  MEHREEN Ramirez   midodrine (PROAMATINE) 5 MG tablet Take 5 mg by mouth 3 times daily  Historical Provider, MD   Cholecalciferol (VITAMIN D3) 5000 units TABS Take by mouth every other day   Historical Provider, MD   Sucroferric Oxyhydroxide (VELPHORO) 500 MG CHEW Take 1 tablet by mouth daily  Historical Provider, MD   linaclotide (LINZESS) 145 MCG capsule Take 145 mcg by mouth every morning (before breakfast)  Historical Provider, MD   carvedilol (COREG) 3.125 MG tablet Take 3.125 mg by mouth 2 times daily (with meals)  Historical Provider, MD   B Complex-C (SUPER B COMPLEX PO) Take 1 tablet by mouth daily  Historical Provider, MD   potassium chloride (KLOR-CON) 20 MEQ packet Take 20 mEq by mouth daily  Historical Provider, MD   aspirin 81 MG tablet Take 81 mg by mouth every other day   Historical Provider, MD   Multiple Vitamins-Minerals (PRESERVISION AREDS 2 PO) Take 1 tablet by mouth 2 times daily   Historical Provider, MD   Omega-3 Fatty Acids (FISH OIL) 1000 MG CPDR Take 1,000 mg by mouth 4 times daily   Historical Provider, MD         Past Medical History:   Diagnosis Date    Acid reflux     Anemia     Atherosclerosis of native coronary artery of native heart without angina pectoris 10/30/2019    B12 deficiency 1/4/2016    Chronic kidney disease     sees Dr. 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 Bruna Haji  1/22/2010    DR. SANCHEZ    ENDOSCOPY, COLON, DIAGNOSTIC      EYE SURGERY      HERN[facility] REPAIR      [facility]    LAPAROSCOPY INSERTION PERITONEAL CATHETER N/A 2/27/2019    LAPAROSCOPIC PERITONEAL DIALYSIS CATHETER PLACEMENT AND POSSIBLE OMENTOPEXY performed by Venita Hills MD at [facility] ENDOSCOPY  2/22/2006    DR. Marshall         Family History   Problem Relation Age of Onset    Breast Cancer Mother 80    Hypertension Mother     Heart Disease Mother         pacemaker    Colon Polyps Mother     High Blood Pressure Mother     Hypertension Sister     High Blood Pressure Sister     Cancer Father         liver    Heart Disease Paternal Grandfather     Breast Cancer Other         maternal great aunt    High Blood Pressure Daughter     Heart Disease Daughter     Diabetes Daughter     Colon Cancer Neg Hx        CareTeam (Including outside providers/suppliers regularly involved in providing care):   Patient Care Team:  MEHREEN Hendrix as PCP - General (Clinical Nurse Specialist Adult Health)  MEHREEN Hendrix as PCP - [facility] Empaneled Provider  Alyssa Man MD as Consulting Physician (Cardiology)  Serafin Ramos MD as Consulting Physician (Otolaryngology)   Dr Godfrey Nunez- routine mammogram screening  Nephrology- on HD    Wt Readings from Last 3 Encounters:   05/26/20 126 lb (57.2 kg)   04/27/20 125 lb (56.7 kg)   11/18/19 134 lb (60.8 kg)      Patient-Reported Vitals 11/24/2020   Patient-Reported Weight 130lb   Patient-Reported Systolic 944   Patient-Reported Diastolic 88   Patient-Reported Pulse 76      There is no height or weight on file to calculate BMI. Based upon direct observation of the patient, evaluation of cognition reveals recent and remote memory intact. Patient's complete Health Risk Assessment and screening values have been reviewed and are found in Flowsheets. The following problems were reviewed today and where indicated follow up appointments were made and/or referrals ordered. Positive Risk Factor Screenings with Interventions:     Hearing/Vision:  No exam data present  Hearing/Vision  Do you or your family notice any trouble with your hearing?: (!) Yes  Do you have difficulty driving, watching TV, or doing any of your daily activities because of your eyesight?: No  Have you had an eye exam within the past year?: Yes  Hearing/Vision Interventions:  · Hearing concerns:  patient declines any further evaluation/treatment for hearing issues    ADL:  ADLs  In the past 7 days, did you need help from others to perform any of the following everyday activities? Eating, dressing, grooming, bathing, toileting, or walking/balance?: None  In the past 7 days, did you need help from others to take care of any of the following?  Laundry, housekeeping, banking/finances, shopping, telephone use, food preparation, transportation, or taking medications?: (!) Shopping  ADL Interventions:  · Patient declines any further evaluation/treatment for this issue    Personalized Preventive Plan   Current Health Maintenance Status  Immunization History   Administered Date(s) Administered    Influenza Virus Vaccine 10/27/2014    Influenza, High Dose (Fluzone 65 yrs and older) 10/01/2019, 10/24/2020    Influenza, Quadv, IM, (6 mo and older Fluzone, Flulaval, Fluarix and 3 yrs and older Afluria) 11/02/2017    Influenza, Quadv, IM, PF (6 mo and older Fluzone, Flulaval, Fluarix, and 3 yrs and older Afluria) 10/10/2016    Pneumococcal Conjugate 13-valent ([id number]) 10/10/2016    Pneumococcal Polysaccharide ([id number]) 07/05/2019    Tdap (Boostrix, Adacel) 07/05/2019        Health Maintenance   Topic Date Due    Shingles Vaccine (1 of 2) 06/09/1989    Colon cancer screen colonoscopy  06/09/2014    Annual Wellness Visit (AWV)  05/29/2019    TSH testing  10/08/2020    DTaP/Tdap/Td vaccine (2 - Td) 07/05/2029    DEXA (modify frequency per FRAX score)  Completed    Flu vaccine  Completed    Pneumococcal 65+ yrs at Risk Vaccine  Completed    Hepatitis A vaccine  Aged Out    Hepatitis B vaccine  Aged Out    Hib vaccine  Aged Out    Meningococcal (ACWY) vaccine  Aged Out     Recommendations for Scrib[employer] Due: see orders and patient instructions/AVS.  . Recommended screening schedule for the next 5-10 years is provided to the patient in written form: see Patient Leonidas Ledbetter was seen today for medicare awv. Diagnoses and all orders for this visit:    Routine general medical examination at a health care facility    Acquired hypothyroidism  -     TSH without Reflex; Future    ESRD on peritoneal dialysis ([facility] [address]          Total telephone time 15 minutes. Charlette Vera is a 79 y.o. female being evaluated by a Virtual Visit (phone) encounter to address concerns as mentioned above. A caregiver was present when appropriate. Due to this being a TeleHealth encounter (During AAFRT-64 public health emergency), evaluation of the following organ systems was limited: Vitals/Constitutional/EENT/Resp/CV/GI//MS/Neuro/Skin/Heme-Lymph-Imm.   Pursuant to the emergency declaration under the [facility], [address] [place] [address] authority and the [employer] Act, this Virtual

## 2021-01-01 ENCOUNTER — NURSE TRIAGE (OUTPATIENT)
Dept: OTHER | Facility: CLINIC | Age: 81
End: 2021-01-01

## 2021-01-01 ENCOUNTER — HOSPITAL ENCOUNTER (OUTPATIENT)
Dept: GENERAL RADIOLOGY | Age: 81
Discharge: HOME | End: 2021-08-18
Payer: MEDICARE

## 2021-01-01 ENCOUNTER — OFFICE VISIT (OUTPATIENT)
Dept: FAMILY MEDICINE CLINIC | Age: 81
End: 2021-01-01
Payer: MEDICARE

## 2021-01-01 ENCOUNTER — OFFICE VISIT (OUTPATIENT)
Dept: URGENT CARE | Age: 81
End: 2021-01-01
Payer: MEDICARE

## 2021-01-01 VITALS
BODY MASS INDEX: 27.44 KG/M2 | OXYGEN SATURATION: 100 % | SYSTOLIC BLOOD PRESSURE: 91 MMHG | TEMPERATURE: 97 F | WEIGHT: 150 LBS | RESPIRATION RATE: 18 BRPM | DIASTOLIC BLOOD PRESSURE: 65 MMHG | HEART RATE: 51 BPM

## 2021-01-01 VITALS
TEMPERATURE: 96.9 F | DIASTOLIC BLOOD PRESSURE: 64 MMHG | BODY MASS INDEX: 22.89 KG/M2 | HEART RATE: 53 BPM | HEIGHT: 62 IN | SYSTOLIC BLOOD PRESSURE: 112 MMHG | WEIGHT: 124.4 LBS | OXYGEN SATURATION: 95 %

## 2021-01-01 VITALS
DIASTOLIC BLOOD PRESSURE: 60 MMHG | BODY MASS INDEX: 21.97 KG/M2 | OXYGEN SATURATION: 94 % | WEIGHT: 119.4 LBS | SYSTOLIC BLOOD PRESSURE: 92 MMHG | TEMPERATURE: 97.9 F | HEIGHT: 62 IN | HEART RATE: 54 BPM | RESPIRATION RATE: 16 BRPM

## 2021-01-01 DIAGNOSIS — K64.4 EXTERNAL HEMORRHOIDS: Primary | ICD-10-CM

## 2021-01-01 DIAGNOSIS — E03.9 ACQUIRED HYPOTHYROIDISM: ICD-10-CM

## 2021-01-01 DIAGNOSIS — N18.6 ESRD ON PERITONEAL DIALYSIS (HCC): ICD-10-CM

## 2021-01-01 DIAGNOSIS — I95.9 HYPOTENSION, UNSPECIFIED HYPOTENSION TYPE: ICD-10-CM

## 2021-01-01 DIAGNOSIS — E03.9 ACQUIRED HYPOTHYROIDISM: Primary | ICD-10-CM

## 2021-01-01 DIAGNOSIS — R07.9 CHEST PAIN, UNSPECIFIED TYPE: Primary | ICD-10-CM

## 2021-01-01 DIAGNOSIS — Z99.2 ESRD ON PERITONEAL DIALYSIS (HCC): ICD-10-CM

## 2021-01-01 DIAGNOSIS — R07.9 CHEST PAIN, UNSPECIFIED TYPE: ICD-10-CM

## 2021-01-01 DIAGNOSIS — I25.10 ATHEROSCLEROSIS OF NATIVE CORONARY ARTERY OF NATIVE HEART WITHOUT ANGINA PECTORIS: ICD-10-CM

## 2021-01-01 DIAGNOSIS — R06.02 SHORTNESS OF BREATH: ICD-10-CM

## 2021-01-01 LAB
POTASSIUM SERPL-SCNC: 3.4 MMOL/L (ref 3.5–5)
TSH SERPL DL<=0.05 MIU/L-ACNC: 4.31 UIU/ML (ref 0.27–4.2)

## 2021-01-01 PROCEDURE — G8420 CALC BMI NORM PARAMETERS: HCPCS | Performed by: CLINICAL NURSE SPECIALIST

## 2021-01-01 PROCEDURE — 1123F ACP DISCUSS/DSCN MKR DOCD: CPT | Performed by: NURSE PRACTITIONER

## 2021-01-01 PROCEDURE — 1123F ACP DISCUSS/DSCN MKR DOCD: CPT | Performed by: CLINICAL NURSE SPECIALIST

## 2021-01-01 PROCEDURE — 4040F PNEUMOC VAC/ADMIN/RCVD: CPT | Performed by: CLINICAL NURSE SPECIALIST

## 2021-01-01 PROCEDURE — 71046 X-RAY EXAM CHEST 2 VIEWS: CPT

## 2021-01-01 PROCEDURE — 1090F PRES/ABSN URINE INCON ASSESS: CPT | Performed by: CLINICAL NURSE SPECIALIST

## 2021-01-01 PROCEDURE — 99213 OFFICE O/P EST LOW 20 MIN: CPT | Performed by: CLINICAL NURSE SPECIALIST

## 2021-01-01 PROCEDURE — 1036F TOBACCO NON-USER: CPT | Performed by: CLINICAL NURSE SPECIALIST

## 2021-01-01 PROCEDURE — 99213 OFFICE O/P EST LOW 20 MIN: CPT | Performed by: NURSE PRACTITIONER

## 2021-01-01 PROCEDURE — G8399 PT W/DXA RESULTS DOCUMENT: HCPCS | Performed by: CLINICAL NURSE SPECIALIST

## 2021-01-01 PROCEDURE — 4040F PNEUMOC VAC/ADMIN/RCVD: CPT | Performed by: NURSE PRACTITIONER

## 2021-01-01 PROCEDURE — 99214 OFFICE O/P EST MOD 30 MIN: CPT | Performed by: CLINICAL NURSE SPECIALIST

## 2021-01-01 PROCEDURE — G8399 PT W/DXA RESULTS DOCUMENT: HCPCS | Performed by: NURSE PRACTITIONER

## 2021-01-01 PROCEDURE — 1090F PRES/ABSN URINE INCON ASSESS: CPT | Performed by: NURSE PRACTITIONER

## 2021-01-01 PROCEDURE — G8427 DOCREV CUR MEDS BY ELIG CLIN: HCPCS | Performed by: NURSE PRACTITIONER

## 2021-01-01 PROCEDURE — 93000 ELECTROCARDIOGRAM COMPLETE: CPT | Performed by: CLINICAL NURSE SPECIALIST

## 2021-01-01 PROCEDURE — G8427 DOCREV CUR MEDS BY ELIG CLIN: HCPCS | Performed by: CLINICAL NURSE SPECIALIST

## 2021-01-01 PROCEDURE — 1036F TOBACCO NON-USER: CPT | Performed by: NURSE PRACTITIONER

## 2021-01-01 PROCEDURE — G8419 CALC BMI OUT NRM PARAM NOF/U: HCPCS | Performed by: NURSE PRACTITIONER

## 2021-01-01 RX ORDER — HYDROCORTISONE 25 MG/G
CREAM TOPICAL
Qty: 1 TUBE | Refills: 0 | Status: SHIPPED | OUTPATIENT
Start: 2021-01-01

## 2021-01-01 RX ORDER — MEGESTROL ACETATE 40 MG/ML
SUSPENSION ORAL
COMMUNITY
Start: 2021-01-01

## 2021-01-01 RX ORDER — LEVOTHYROXINE SODIUM 0.05 MG/1
TABLET ORAL
Qty: 90 TABLET | Refills: 3 | Status: SHIPPED | OUTPATIENT
Start: 2021-01-01

## 2021-01-01 RX ORDER — LIDOCAINE 5 G/100G
1 CREAM RECTAL; TOPICAL 3 TIMES DAILY PRN
Qty: 1 TUBE | Refills: 0 | Status: SHIPPED | OUTPATIENT
Start: 2021-01-01

## 2021-01-01 RX ORDER — LIDOCAINE 40 MG/G
CREAM TOPICAL
Qty: 1 TUBE | Refills: 0 | Status: SHIPPED | OUTPATIENT
Start: 2021-01-01

## 2021-01-01 ASSESSMENT — ENCOUNTER SYMPTOMS
COLOR CHANGE: 0
NAUSEA: 0
EYE DISCHARGE: 0
VOMITING: 0
SORE THROAT: 0
NAUSEA: 0
TROUBLE SWALLOWING: 0
VOMITING: 0
SINUS PRESSURE: 0
ABDOMINAL PAIN: 0
WHEEZING: 0
WHEEZING: 0
SINUS PRESSURE: 0
TROUBLE SWALLOWING: 0
ABDOMINAL PAIN: 0
EYE REDNESS: 0
SHORTNESS OF BREATH: 0
COUGH: 0
FACIAL SWELLING: 0
RECTAL PAIN: 1
COUGH: 0
EYE PAIN: 0
CONSTIPATION: 1
EYE DISCHARGE: 0
HEMATOCHEZIA: 1
DIARRHEA: 0
CONSTIPATION: 0
DIARRHEA: 0
SHORTNESS OF BREATH: 0
COLOR CHANGE: 0
FACIAL SWELLING: 0
EYE REDNESS: 0
SORE THROAT: 0
DIARRHEA: 0
BACK PAIN: 0
CHEST TIGHTNESS: 0
BACK PAIN: 0
EYE PAIN: 0
DIFFICULTY BREATHING: 0
CHEST TIGHTNESS: 0
ABDOMINAL PAIN: 0

## 2021-01-01 ASSESSMENT — PATIENT HEALTH QUESTIONNAIRE - PHQ9
2. FEELING DOWN, DEPRESSED OR HOPELESS: 0
SUM OF ALL RESPONSES TO PHQ QUESTIONS 1-9: 0
SUM OF ALL RESPONSES TO PHQ QUESTIONS 1-9: 0
SUM OF ALL RESPONSES TO PHQ9 QUESTIONS 1 & 2: 0

## 2021-02-22 ENCOUNTER — IMMUNIZATION (OUTPATIENT)
Dept: VACCINE CLINIC | Facility: HOSPITAL | Age: 81
End: 2021-02-22

## 2021-02-22 PROCEDURE — 0011A: CPT | Performed by: OBSTETRICS & GYNECOLOGY

## 2021-02-22 PROCEDURE — 91301 HC SARSCO02 VAC 100MCG/0.5ML IM: CPT | Performed by: OBSTETRICS & GYNECOLOGY

## 2021-03-22 ENCOUNTER — IMMUNIZATION (OUTPATIENT)
Dept: VACCINE CLINIC | Facility: HOSPITAL | Age: 81
End: 2021-03-22

## 2021-03-22 PROCEDURE — 0012A: CPT | Performed by: OBSTETRICS & GYNECOLOGY

## 2021-03-22 PROCEDURE — 91301 HC SARSCO02 VAC 100MCG/0.5ML IM: CPT | Performed by: OBSTETRICS & GYNECOLOGY

## 2021-05-27 NOTE — PROGRESS NOTES
Daughter     Diabetes Daughter     Colon Cancer Neg Hx      Social History     Tobacco Use    Smoking status: Never Smoker    Smokeless tobacco: Never Used   Substance Use Topics    Alcohol use: No     Current Outpatient Medications   Medication Sig Dispense Refill    levothyroxine (SYNTHROID) 50 MCG tablet TAKE ONE TABLET BY MOUTH DAILY 90 tablet 3    midodrine (PROAMATINE) 5 MG tablet Take 5 mg by mouth 3 times daily      Cholecalciferol (VITAMIN D3) 5000 units TABS Take by mouth every other day       linaclotide (LINZESS) 145 MCG capsule Take 145 mcg by mouth every morning (before breakfast)      carvedilol (COREG) 3.125 MG tablet Take 3.125 mg by mouth 2 times daily (with meals)      B Complex-C (SUPER B COMPLEX PO) Take 1 tablet by mouth daily      potassium chloride (KLOR-CON) 20 MEQ packet Take 20 mEq by mouth daily      aspirin 81 MG tablet Take 81 mg by mouth every other day       Multiple Vitamins-Minerals (PRESERVISION AREDS 2 PO) Take 1 tablet by mouth 2 times daily       Omega-3 Fatty Acids (FISH OIL) 1000 MG CPDR Take 1,000 mg by mouth 4 times daily        No current facility-administered medications for this visit. Allergies   Allergen Reactions    Metolazone Other (See Comments)     Causes fainting.  Statins Other (See Comments)     Muscle aches        Review of Systems   Constitutional: Negative for appetite change, chills, diaphoresis, fatigue and fever. HENT: Negative for congestion, ear pain, facial swelling, hearing loss, postnasal drip, sinus pressure, sore throat and trouble swallowing. Eyes: Negative for pain, discharge, redness and visual disturbance. Respiratory: Negative for cough, chest tightness, shortness of breath and wheezing. Cardiovascular: Positive for leg swelling. Negative for chest pain and palpitations. Gastrointestinal: Positive for constipation. Negative for abdominal pain, diarrhea, nausea and vomiting.    Genitourinary: Negative for difficulty urinating, dysuria, flank pain, frequency, hematuria and urgency. Musculoskeletal: Negative for arthralgias, back pain, joint swelling and neck pain. Skin: Negative for color change and rash. Neurological: Negative for dizziness, syncope, weakness, light-headedness and headaches. Hematological: Negative for adenopathy. Does not bruise/bleed easily. Psychiatric/Behavioral: Negative for confusion, dysphoric mood, sleep disturbance and suicidal ideas. The patient is not nervous/anxious. OBJECTIVE:  /64   Pulse 53   Temp 96.9 °F (36.1 °C) (Temporal)   Ht 5' 2\" (1.575 m)   Wt 124 lb 6.4 oz (56.4 kg)   LMP  (LMP Unknown)   SpO2 95%   BMI 22.75 kg/m²    Physical Exam  Vitals reviewed. Constitutional:       General: She is not in acute distress. Appearance: She is well-developed. She is not ill-appearing or toxic-appearing. HENT:      Head: Normocephalic and atraumatic. Right Ear: Tympanic membrane, ear canal and external ear normal.      Left Ear: Tympanic membrane, ear canal and external ear normal.   Eyes:      General:         Right eye: No discharge. Left eye: No discharge. Conjunctiva/sclera: Conjunctivae normal.      Pupils: Pupils are equal, round, and reactive to light. Neck:      Thyroid: No thyromegaly. Trachea: No tracheal deviation. Cardiovascular:      Rate and Rhythm: Normal rate and regular rhythm. Heart sounds: No murmur heard. Pulmonary:      Effort: Pulmonary effort is normal. No respiratory distress. Breath sounds: Normal breath sounds. No wheezing or rales. Genitourinary:     Vagina: Normal.   Musculoskeletal:         General: No deformity. Normal range of motion. Cervical back: Normal range of motion and neck supple. Right lower leg: Edema present. Left lower leg: Edema present. Lymphadenopathy:      Cervical: No cervical adenopathy. Skin:     General: Skin is warm and dry.       Findings: No

## 2021-07-08 ENCOUNTER — APPOINTMENT (OUTPATIENT)
Dept: MAMMOGRAPHY | Facility: HOSPITAL | Age: 81
End: 2021-07-08

## 2021-07-09 ENCOUNTER — HOSPITAL ENCOUNTER (OUTPATIENT)
Dept: MAMMOGRAPHY | Facility: HOSPITAL | Age: 81
Discharge: HOME | End: 2021-07-09
Admitting: SPECIALIST

## 2021-07-09 DIAGNOSIS — Z12.31 ENCOUNTER FOR SCREENING MAMMOGRAM FOR MALIGNANT NEOPLASM OF BREAST: ICD-10-CM

## 2021-07-09 PROCEDURE — 77067 SCR MAMMO BI INCL CAD: CPT

## 2021-07-09 PROCEDURE — 77063 BREAST TOMOSYNTHESIS BI: CPT

## 2021-07-15 ENCOUNTER — TRANSCRIBE ORDERS (OUTPATIENT)
Dept: ADMINISTRATIVE | Facility: HOSPITAL | Age: 81
End: 2021-07-15

## 2021-07-15 DIAGNOSIS — Z12.31 ENCOUNTER FOR SCREENING MAMMOGRAM FOR MALIGNANT NEOPLASM OF BREAST: Primary | ICD-10-CM

## 2021-08-14 NOTE — PROGRESS NOTES
[address]    [address], [address]     Phone:  [phone]  Fax:  [phone]      Nelson Schroeder is a 79 y.o. female who presents today for her medical conditions/complaints as noted below. Nelson Schroeder is c/o of Hemorrhoids and Rectal Bleeding      Chief Complaint   Patient presents with    Hemorrhoids    Rectal Bleeding       HPI:       Nelson Schroeder presents today for   Rectal Bleeding   The current episode started 5 to 7 days ago. The onset was gradual. The problem occurs frequently. The problem has been unchanged. The pain is moderate. The stool is described as soft. There was no prior unsuccessful therapy. Associated symptoms include hemorrhoids and rectal pain. Pertinent negatives include no fever, no abdominal pain, no diarrhea, no nausea, no vomiting, no hematuria, no vaginal bleeding, no vaginal discharge, no difficulty breathing and no rash. She has been behaving normally. She has been eating and drinking normally. Urine output has been absent (on PD). Her past medical history is significant for abdominal surgery (Peritoneal dialysis). Her past medical history does not include recent antibiotic use, recent change in diet or a recent illness. There were no sick contacts. She has a history of hemorrhoids. She has previously used suppositories successfully. She is no longer able to use suppositories due to PD. She is currently using hydrocortisone and this is not helping. She does not see a GI specialist. The blood is on the toilet paper when she wipes and small amount in toilet. It is bright red. Her rectum is tender. Her daughter is with her today and helps with history.      Past Medical History:   Diagnosis Date    Acid reflux     Anemia     Atherosclerosis of native coronary artery of native heart without angina pectoris 10/30/2019    B12 deficiency 1/4/2016    Chronic kidney disease     sees Dr. 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 Champion Roles  1/22/2010    DR. SANCHEZ    ENDOSCOPY, COLON, DIAGNOSTIC      EYE SURGERY      HERNIA REPAIR      [facility]    LAPAROSCOPY INSERTION PERITONEAL CATHETER N/A 2/27/2019    LAPAROSCOPIC PERITONEAL DIALYSIS CATHETER PLACEMENT AND POSSIBLE OMENTOPEXY performed by Rajiv Flores MD at [address] [place] [address] ENDOSCOPY  2/22/2006    DR. SANCHEZ       Social History     Tobacco Use    Smoking status: Never Smoker    Smokeless tobacco: Never Used   Substance Use Topics    Alcohol use: No        Current Outpatient Medications   Medication Sig Dispense Refill    lidocaine (LMX) 4 % cream Nifredipine, Lidocaine QS 1 Tube 0    Lidocaine, Anorectal, 5 % CREA Apply 1 applicator topically 3 times daily as needed (rectal pain) 1 Tube 0    hydrocortisone (ANUSOL-HC) 2.5 % CREA rectal cream Apply to rectum four times daily as needed for pain 1 Tube 0    levothyroxine (SYNTHROID) 50 MCG tablet TAKE ONE TABLET BY MOUTH DAILY 90 tablet 3    midodrine (PROAMATINE) 5 MG tablet Take 5 mg by mouth 3 times daily      Cholecalciferol (VITAMIN D3) 5000 units TABS Take by mouth every other day       linaclotide (LINZESS) 145 MCG capsule Take 145 mcg by mouth every morning (before breakfast)      carvedilol (COREG) 3.125 MG tablet Take 3.125 mg by mouth 2 times daily (with meals)      B Complex-C (SUPER B COMPLEX PO) Take 1 tablet by mouth daily      potassium chloride (KLOR-CON) 20 MEQ packet Take 20 mEq by mouth daily      aspirin 81 MG tablet Take 81 mg by mouth every other day       Multiple Vitamins-Minerals (PRESERVISION AREDS 2 PO) Take 1 tablet by mouth 2 times daily Skin:     Capillary Refill: Capillary refill takes less than 2 seconds. Neurological:      Mental Status: She is alert and oriented to person, place, and time. Motor: Weakness present. Psychiatric:         Mood and Affect: Mood normal.         Behavior: Behavior normal.         BP 91/65   Pulse 51   Temp 97 °F (36.1 °C) (Temporal)   Resp 18   Wt 150 lb (68 kg)   LMP  (LMP Unknown)   SpO2 100%   BMI 27.44 kg/m²     Assessment:      Diagnosis Orders   1. External hemorrhoids  lidocaine (LMX) 4 % cream    Lidocaine, Anorectal, 5 % CREA    hydrocortisone (ANUSOL-HC) 2.5 % CREA rectal cream       No results found for this visit on 08/14/21. Plan:     Use lidocaine and hydrocortisone, if this does not work, go to compounding pharmacy for crack cream or cecils crack cream- paper RX was given. She is to f/u on Monday with Nephrology regarding BP or go to ER with worsening BP. Return if symptoms worsen or fail to improve. No orders of the defined types were placed in this encounter. Orders Placed This Encounter   Medications    lidocaine (LMX) 4 % cream     Sig: Nifredipine, Lidocaine QS     Dispense:  1 Tube     Refill:  0     Compounded crack cream or jovanny crack cream    Lidocaine, Anorectal, 5 % CREA     Sig: Apply 1 applicator topically 3 times daily as needed (rectal pain)     Dispense:  1 Tube     Refill:  0    hydrocortisone (ANUSOL-HC) 2.5 % CREA rectal cream     Sig: Apply to rectum four times daily as needed for pain     Dispense:  1 Tube     Refill:  0        Patient offered educational materials - see patient instructions for any instruction needed. Discussed use, benefit, and side effects of prescribed medications. All patient questions answered. Instructed to continue current medications, diet and exercise. Patient agreed with treatment plan. Follow up as directed. Patient was advised to go to the ED if condition ever becomes emergent.        Electronically signed by Selina Levi, APRN on 8/14/2021 at 8:51 AM

## 2021-08-16 NOTE — TELEPHONE ENCOUNTER
Call came in from Beryl  at the [facility]    Duplicate call:  Patient was evaluated by her Nephrologist today and told to call her PCP for an appt concerning her low blood pressure and weakness. Transfer to Emi Marcelino at the [facility] for scheduling.

## 2021-08-18 NOTE — PROGRESS NOTES
SUBJECTIVE:  Olivia Ralph is a 79 y.o. who presents today for Hypotension (for about a month now) and Fatigue      HPI    Ms Best Cosme presents today with her daughter. She is in a w/c today which is very atypical.  She reports noticing a lower than normal blood pressure while checking at home 4-5 weeks ago. She checks twice daily due to peritoneal dialysis. She mentioned to nephrology at follow up at clinic, midodrine was added at 5mg TID. When this did not help, it was increased to 10mg TID and she was advised to see her PCP. She did not call until 8/16/21 and appt given for today. She has been significantly weaker than normal.  She is having chest pain and posterior neck pain after taking the midodrine. She has to lay down to get relief. Her daughter states her color has been very grey or pale. She also has been more swollen and some discoloration to her feet at times. Over the weekend she felt very bad. Her potassium was 2.2 per nephrology. This was corrected and repeat 3.4   She feels some better having this up. Her renal function has declined. Nephrology concerned about peritoneal lining and that it may be less effective due to this. She is no longer making urine. She did have some rectal bleeding 2/2 hemorrhoids, states this is better. Long, open discussion with patient and daughter. Ms Best Cosme does not want hemodialysis. She also does not desire extensive cardiac work up like stress testing or heart cath. She is interested in palliative care or Hospice.      Past Medical History:   Diagnosis Date    Acid reflux     Anemia     Atherosclerosis of native coronary artery of native heart without angina pectoris 10/30/2019    B12 deficiency 1/4/2016    Chronic kidney disease     sees Dr. 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 Raj Tesfaye  1/22/2010    DR. SANCHEZ    ENDOSCOPY, COLON, DIAGNOSTIC      EYE SURGERY      [facility]ERN[facility]PAIR      [facility]    LAPAROSCOPY INSERTION PERITONEAL CATHETER N/A 2/27/2019    LAPAROSCOPIC PERITONEAL DIALYSIS CATHETER PLACEMENT AND POSSIBLE OMENTOPEXY performed by Kole Caceres MD at [facility] [facility] ENDOSCOPY  2/22/2006    DR. SANCHEZ     Family History   Problem Relation Age of Onset    Breast Cancer Mother 80    Hypertension Mother     Heart Disease Mother         pacemaker    Colon Polyps Mother     High Blood Pressure Mother     Hypertension Sister     High Blood Pressure Sister     Cancer Father         liver    Heart Disease Paternal Grandfather     Breast Cancer Other         maternal great aunt    High Blood Pressure Daughter     Heart Disease Daughter     Diabetes Daughter     Colon Cancer Neg Hx      Social History     Tobacco Use    Smoking status: Never Smoker    Smokeless tobacco: Never Used   Substance Use Topics    Alcohol use: No     Current Outpatient Medications   Medication Sig Dispense Refill    lidocaine (LMX) 4 % cream Nifredipine, Lidocaine QS 1 Tube 0    Lidocaine, Anorectal, 5 % CREA Apply 1 applicator topically 3 times daily as needed (rectal pain) 1 Tube 0    hydrocortisone (ANUSOL-HC) 2.5 % CREA rectal cream Apply to rectum four times daily as needed for pain 1 Tube 0    levothyroxine (SYNTHROID) 50 MCG tablet TAKE ONE TABLET BY MOUTH DAILY 90 tablet 3    midodrine (PROAMATINE) 5 MG tablet Take 5 mg by mouth 3 times daily      Cholecalciferol (VITAMIN D3) 5000 units TABS Take by mouth every other day       linaclotide (LINZESS) 145 MCG capsule Take 145 mcg by mouth every morning (before breakfast)      carvedilol (COREG) 3.125 MG tablet Take 3.125 mg by mouth 2 times daily (with meals)      B Complex-C (SUPER B COMPLEX PO) Take 1 tablet by mouth daily      potassium chloride (KLOR-CON) 20 MEQ packet Take 20 mEq by mouth daily      aspirin 81 MG tablet Take 81 mg by mouth every other day       Multiple Vitamins-Minerals (PRESERVISION AREDS 2 PO) Take 1 tablet by mouth 2 times daily       Omega-3 Fatty Acids (FISH OIL) 1000 MG CPDR Take 1,000 mg by mouth 4 times daily       megestrol (MEGACE) 40 MG/ML suspension        No current facility-administered medications for this visit. Allergies   Allergen Reactions    Metolazone Other (See Comments)     Causes fainting.  Statins Other (See Comments)     Muscle aches        Review of Systems   Constitutional: Positive for activity change, appetite change and fatigue. Negative for chills, diaphoresis and fever. HENT: Negative for congestion, ear pain, facial swelling, hearing loss, postnasal drip, sinus pressure, sore throat and trouble swallowing. Eyes: Negative for pain, discharge, redness and visual disturbance. Respiratory: Negative for cough, chest tightness, shortness of breath and wheezing. Cardiovascular: Positive for chest pain and leg swelling. Negative for palpitations. Gastrointestinal: Negative for abdominal pain, constipation and diarrhea. Genitourinary: Positive for decreased urine volume. Negative for difficulty urinating, flank pain, frequency and urgency. Musculoskeletal: Negative for arthralgias, back pain, joint swelling and neck pain. Skin: Negative for color change and rash. Neurological: Negative for dizziness, syncope, weakness, light-headedness and headaches. Hematological: Negative for adenopathy. Does not bruise/bleed easily. Psychiatric/Behavioral: Negative for confusion and dysphoric mood. The patient is not nervous/anxious.          OBJECTIVE:  BP 92/60   Pulse 54   Temp 97.9 °F (36.6 °C) (Temporal)   Resp 16   Ht 5' 2\" (1.575 m) Wt 119 lb 6.4 oz (54.2 kg)   LMP  (LMP Unknown)   SpO2 94%   BMI 21.84 kg/m²    Physical Exam  Vitals reviewed. Constitutional:       General: She is not in acute distress. Appearance: She is well-developed. She is not ill-appearing or toxic-appearing. HENT:      Head: Normocephalic and atraumatic. Eyes:      General:         Right eye: No discharge. Left eye: No discharge. Conjunctiva/sclera: Conjunctivae normal.      Pupils: Pupils are equal, round, and reactive to light. Neck:      Thyroid: No thyromegaly. Trachea: No tracheal deviation. Cardiovascular:      Rate and Rhythm: Normal rate and regular rhythm. Heart sounds: Murmur heard. Pulmonary:      Effort: Pulmonary effort is normal. No respiratory distress. Breath sounds: Normal breath sounds. No wheezing or rales. Genitourinary:     Vagina: Normal.   Musculoskeletal:         General: No deformity. Normal range of motion. Cervical back: Normal range of motion and neck supple. Right lower leg: Edema present. Left lower leg: Edema present. Lymphadenopathy:      Cervical: No cervical adenopathy. Skin:     General: Skin is warm and dry. Coloration: Skin is sallow. Findings: No erythema or rash. Neurological:      Mental Status: She is alert and oriented to person, place, and time. Mental status is at baseline. Psychiatric:         Mood and Affect: Mood normal.         Behavior: Behavior normal.         Thought Content:  Thought content normal.         Judgment: Judgment normal.       Lab Results   Component Value Date    WBC 14.0 (H) 08/18/2021    HGB 9.1 (L) 08/18/2021    HCT 30.7 (L) 08/18/2021    .9 (H) 08/18/2021     08/18/2021     Lab Results   Component Value Date     (L) 08/18/2021    K 3.2 (L) 08/18/2021    CL 91 (L) 08/18/2021    CO2 19 (L) 08/18/2021    BUN 47 (H) 08/18/2021    CREATININE 10.0 (H) 08/18/2021    GLUCOSE 96 08/18/2021    CALCIUM 9.0 08/18/2021    PROT 6.4 (L) 08/18/2021    LABALBU 2.7 (L) 08/18/2021    BILITOT 0.3 08/18/2021    ALKPHOS 112 (H) 08/18/2021    AST 10 08/18/2021    ALT 9 08/18/2021    LABGLOM 4 (A) 08/18/2021    GFRAA 5 (L) 08/18/2021    GLOB 3.0 07/25/2016       Lab Results   Component Value Date    CRP 3.64 (H) 08/18/2021     Lab Results   Component Value Date    INR 0.90 08/18/2021    PROTIME 12.4 08/18/2021     Dimer 3.27  BNP 04927  Trop 0.08    XR CHEST (2 VW)  Narrative: Examination. XR CHEST (2 VW) 8/18/2021 3:13 PM  History: Chest pain. Frontal and lateral views of the chest are obtained. The comparison is  made with the previous study dated 6/1/2018. The lungs are moderately well-expanded. There is small bibasilar pleural effusion. There is no pulmonary congestion or pneumothorax. No active  infiltrate. There is moderate cardiomegaly. Atheromatous changes thoracic aorta  are noted. There is a large hiatal hernia with air-fluid level. There is more moderate diffuse osteopenia. There is increased dorsal  kyphosis. There is no acute bony abnormality. Impression: Small bibasal pleural effusion. No active infiltrate or pulmonary congestion. A large hiatal hernia. Signed by Dr Mauro Nguyễn      EKG with ST and T wave changes compared to 2019    ASSESSMENT/PLAN:  1. Chest pain, unspecified type  - EKG 12 Lead  - XR CHEST (2 VW); Future  - Troponin; Future  - C-Reactive Protein; Future  - Brain Natriuretic Peptide; Future  - D-Dimer, Quantitative; Future  - Protime-INR; Future    2. Hypotension, unspecified hypotension type  - CBC Auto Differential; Future  - Comprehensive Metabolic Panel; Future    3. Atherosclerosis of native coronary artery of native heart without angina pectoris  - Troponin; Future  - C-Reactive Protein; Future  - Brain Natriuretic Peptide; Future  - D-Dimer, Quantitative; Future  - Protime-INR; Future    4. Shortness of breath  - Brain Natriuretic Peptide;  Future      Ms Ruben Maloney presents today with her daughter. She has been feeling poorly for 4-5 weeks with fatigue and low blood pressure. She has had rectal bleeding at times. Her chest and posterior neck hurt with taking midodrine. I suspect cardiac ischemia vs PE vs ESRD complications. Her lab work up is compatible with any of these. Long discussion with them on her end of life plans in office and again with daughter via telephone after results came in. Ms Natasha Scott is adamant that she does not want hemodialysis. She also does not extensive cardiac evaluation like stress testing or cardiac cath. I explained to her daughter that are options are going to ER, hospital for further work up on above, referral to specialist, VQ scan for PE or referral to palliative care/Hospice. She will talk with her mother today and call me back or go to ER. She understands risk of prolonging treatment and that death may be end result. Addendum-- Ms Natasha Scott has chosen to pursue Hospice. Referral ordered today. Return for already scheduled.

## 2022-07-11 ENCOUNTER — APPOINTMENT (OUTPATIENT)
Dept: MAMMOGRAPHY | Facility: HOSPITAL | Age: 82
End: 2022-07-11
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
## (undated) DEVICE — CONNECTOR CATH TWO PART AD FOR PERITONEAL DLYS FLX NK

## (undated) DEVICE — GOWN,PREVENTION PLUS,2XL,ST,22/CS: Brand: MEDLINE

## (undated) DEVICE — Y-TYPE TUR/BLADDER IRRIGATION SET, REGULATING CLAMP

## (undated) DEVICE — SUTURE VCRL SZ 3-0 L27IN ABSRB UD L26MM SH 1/2 CIR J416H

## (undated) DEVICE — 3M™ TEGADERM™ TRANSPARENT FILM DRESSING FRAME STYLE, 1626W, 4 IN X 4-3/4 IN (10 CM X 12 CM), 50/CT 4CT/CASE: Brand: 3M™ TEGADERM™

## (undated) DEVICE — TROCAR ENDOSCP L100MM DIA5MM BLDELSS STBL SL OBT RADLUC

## (undated) DEVICE — GLOVE SURG SZ 7 CRM LTX FREE POLYISOPRENE POLYMER BEAD ANTI

## (undated) DEVICE — GENERAL LAP CDS

## (undated) DEVICE — SUTURE ETHLN SZ 3-0 L18IN NONABSORBABLE BLK FS-1 L24MM 3/8 663H

## (undated) DEVICE — DRAPE,UTILITY,XL,4/PK,STERILE: Brand: MEDLINE

## (undated) DEVICE — ADHESIVE SKIN CLSR 0.7ML TOP DERMBND ADV

## (undated) DEVICE — Device

## (undated) DEVICE — SOLUTION IV IRRIG POUR BRL 0.9% SODIUM CHL 2F7124

## (undated) DEVICE — TROCAR ENDOSCP L100MM DIA5MM BLDELSS STBL SL THRD OPT VW

## (undated) DEVICE — CATHETER ADAPTER: Brand: ADDTO

## (undated) DEVICE — SUTURE MCRYL SZ 4-0 L18IN ABSRB UD L19MM PS-2 3/8 CIR PRIM Y496G